# Patient Record
Sex: MALE | Race: WHITE | NOT HISPANIC OR LATINO | Employment: OTHER | URBAN - METROPOLITAN AREA
[De-identification: names, ages, dates, MRNs, and addresses within clinical notes are randomized per-mention and may not be internally consistent; named-entity substitution may affect disease eponyms.]

---

## 2017-05-02 ENCOUNTER — CONVERSION ENCOUNTER (OUTPATIENT)
Dept: ORTHOPEDIC SURGERY | Facility: CLINIC | Age: 77
End: 2017-05-02

## 2017-05-02 ENCOUNTER — HOSPITAL ENCOUNTER (OUTPATIENT)
Dept: ORTHOPEDIC SURGERY | Facility: CLINIC | Age: 77
Discharge: HOME | End: 2017-05-02
Attending: ORTHOPAEDIC SURGERY | Admitting: ORTHOPAEDIC SURGERY

## 2017-12-08 ENCOUNTER — CONVERSION ENCOUNTER (OUTPATIENT)
Dept: ORTHOPEDIC SURGERY | Facility: CLINIC | Age: 77
End: 2017-12-08

## 2019-01-01 ENCOUNTER — HOSPITAL ENCOUNTER (OUTPATIENT)
Dept: ONCOLOGY | Facility: HOSPITAL | Age: 79
Setting detail: INFUSION SERIES
Discharge: HOME | End: 2019-08-20

## 2019-01-01 ENCOUNTER — HOSPITAL ENCOUNTER (OUTPATIENT)
Dept: OTHER | Facility: HOSPITAL | Age: 79
Setting detail: SPECIMEN
Discharge: HOME | End: 2019-05-16
Attending: SURGERY | Admitting: SURGERY

## 2019-01-01 ENCOUNTER — HOSPITAL ENCOUNTER (OUTPATIENT)
Dept: INFUSION THERAPY | Facility: HOSPITAL | Age: 79
Setting detail: INFUSION SERIES
Discharge: HOME | End: 2019-09-04

## 2019-01-01 ENCOUNTER — LAB (OUTPATIENT)
Dept: LAB | Facility: HOSPITAL | Age: 79
End: 2019-01-01

## 2019-01-01 ENCOUNTER — HOSPITAL ENCOUNTER (OUTPATIENT)
Dept: ONCOLOGY | Facility: HOSPITAL | Age: 79
Setting detail: INFUSION SERIES
Discharge: HOME | End: 2019-08-21

## 2019-01-01 ENCOUNTER — HOSPITAL ENCOUNTER (OUTPATIENT)
Dept: ONCOLOGY | Facility: HOSPITAL | Age: 79
Setting detail: INFUSION SERIES
Discharge: HOME | End: 2019-08-27

## 2019-01-01 ENCOUNTER — HOSPITAL ENCOUNTER (OUTPATIENT)
Dept: LAB | Facility: HOSPITAL | Age: 79
Discharge: HOME | End: 2019-03-14
Attending: INTERNAL MEDICINE | Admitting: INTERNAL MEDICINE

## 2019-01-01 ENCOUNTER — RESULTS ENCOUNTER (OUTPATIENT)
Dept: ONCOLOGY | Facility: CLINIC | Age: 79
End: 2019-01-01

## 2019-01-01 ENCOUNTER — TELEPHONE (OUTPATIENT)
Dept: ONCOLOGY | Facility: CLINIC | Age: 79
End: 2019-01-01

## 2019-01-01 ENCOUNTER — PROCEDURE VISIT (OUTPATIENT)
Dept: ONCOLOGY | Facility: CLINIC | Age: 79
End: 2019-01-01

## 2019-01-01 ENCOUNTER — HOSPITAL ENCOUNTER (OUTPATIENT)
Dept: ONCOLOGY | Facility: HOSPITAL | Age: 79
Setting detail: INFUSION SERIES
Discharge: HOME | End: 2019-09-23

## 2019-01-01 ENCOUNTER — READMISSION MANAGEMENT (OUTPATIENT)
Dept: CALL CENTER | Facility: HOSPITAL | Age: 79
End: 2019-01-01

## 2019-01-01 ENCOUNTER — APPOINTMENT (OUTPATIENT)
Dept: ONCOLOGY | Facility: HOSPITAL | Age: 79
End: 2019-01-01

## 2019-01-01 ENCOUNTER — OFFICE VISIT (OUTPATIENT)
Dept: ONCOLOGY | Facility: CLINIC | Age: 79
End: 2019-01-01

## 2019-01-01 ENCOUNTER — HOSPITAL ENCOUNTER (OUTPATIENT)
Dept: ONCOLOGY | Facility: HOSPITAL | Age: 79
Setting detail: INFUSION SERIES
Discharge: HOME | End: 2019-09-04

## 2019-01-01 ENCOUNTER — DOCUMENTATION (OUTPATIENT)
Dept: ONCOLOGY | Facility: HOSPITAL | Age: 79
End: 2019-01-01

## 2019-01-01 ENCOUNTER — APPOINTMENT (OUTPATIENT)
Dept: LAB | Facility: HOSPITAL | Age: 79
End: 2019-01-01

## 2019-01-01 ENCOUNTER — HOSPITAL ENCOUNTER (OUTPATIENT)
Dept: ONCOLOGY | Facility: HOSPITAL | Age: 79
Setting detail: INFUSION SERIES
Discharge: HOME | End: 2019-09-03

## 2019-01-01 ENCOUNTER — HOSPITAL ENCOUNTER (OUTPATIENT)
Dept: ONCOLOGY | Facility: HOSPITAL | Age: 79
Setting detail: INFUSION SERIES
Discharge: HOME | End: 2019-08-23

## 2019-01-01 ENCOUNTER — HOSPITAL ENCOUNTER (OUTPATIENT)
Dept: ONCOLOGY | Facility: HOSPITAL | Age: 79
Setting detail: INFUSION SERIES
Discharge: HOME | End: 2019-08-30

## 2019-01-01 ENCOUNTER — APPOINTMENT (OUTPATIENT)
Dept: GENERAL RADIOLOGY | Facility: HOSPITAL | Age: 79
End: 2019-01-01

## 2019-01-01 ENCOUNTER — HOSPITAL ENCOUNTER (OUTPATIENT)
Dept: INFUSION THERAPY | Facility: HOSPITAL | Age: 79
Setting detail: INFUSION SERIES
Discharge: HOME | End: 2019-08-30

## 2019-01-01 ENCOUNTER — CONSULT (OUTPATIENT)
Dept: ONCOLOGY | Facility: CLINIC | Age: 79
End: 2019-01-01

## 2019-01-01 ENCOUNTER — APPOINTMENT (OUTPATIENT)
Dept: CT IMAGING | Facility: HOSPITAL | Age: 79
End: 2019-01-01

## 2019-01-01 ENCOUNTER — HOSPITAL ENCOUNTER (INPATIENT)
Facility: HOSPITAL | Age: 79
LOS: 4 days | Discharge: HOME HEALTH | End: 2019-09-08
Attending: INTERNAL MEDICINE | Admitting: FAMILY MEDICINE

## 2019-01-01 ENCOUNTER — HOSPITAL ENCOUNTER (OUTPATIENT)
Dept: ONCOLOGY | Facility: HOSPITAL | Age: 79
Setting detail: INFUSION SERIES
Discharge: HOME | End: 2019-08-28

## 2019-01-01 ENCOUNTER — HOSPITAL ENCOUNTER (OUTPATIENT)
Dept: PREADMISSION TESTING | Facility: HOSPITAL | Age: 79
Discharge: HOME | End: 2019-05-06
Attending: SURGERY | Admitting: SURGERY

## 2019-01-01 ENCOUNTER — APPOINTMENT (OUTPATIENT)
Dept: ONCOLOGY | Facility: CLINIC | Age: 79
End: 2019-01-01

## 2019-01-01 ENCOUNTER — HOSPITAL ENCOUNTER (OUTPATIENT)
Dept: ONCOLOGY | Facility: HOSPITAL | Age: 79
Setting detail: INFUSION SERIES
Discharge: HOME | End: 2019-08-26

## 2019-01-01 ENCOUNTER — HOSPITAL ENCOUNTER (INPATIENT)
Facility: HOSPITAL | Age: 79
LOS: 9 days | Discharge: HOSPICE | End: 2019-10-02
Attending: EMERGENCY MEDICINE | Admitting: INTERNAL MEDICINE

## 2019-01-01 ENCOUNTER — HOSPITAL ENCOUNTER (OUTPATIENT)
Dept: ONCOLOGY | Facility: HOSPITAL | Age: 79
Setting detail: INFUSION SERIES
Discharge: HOME | End: 2019-08-29

## 2019-01-01 VITALS
TEMPERATURE: 98.4 F | SYSTOLIC BLOOD PRESSURE: 102 MMHG | HEIGHT: 74 IN | WEIGHT: 183 LBS | DIASTOLIC BLOOD PRESSURE: 63 MMHG | HEART RATE: 80 BPM | BODY MASS INDEX: 23.49 KG/M2 | RESPIRATION RATE: 20 BRPM

## 2019-01-01 VITALS
HEART RATE: 105 BPM | RESPIRATION RATE: 18 BRPM | DIASTOLIC BLOOD PRESSURE: 73 MMHG | TEMPERATURE: 98.1 F | HEIGHT: 74 IN | SYSTOLIC BLOOD PRESSURE: 111 MMHG | BODY MASS INDEX: 23.61 KG/M2 | WEIGHT: 184 LBS

## 2019-01-01 VITALS
DIASTOLIC BLOOD PRESSURE: 71 MMHG | WEIGHT: 184 LBS | SYSTOLIC BLOOD PRESSURE: 108 MMHG | HEIGHT: 74 IN | SYSTOLIC BLOOD PRESSURE: 94 MMHG | HEART RATE: 71 BPM | RESPIRATION RATE: 18 BRPM | BODY MASS INDEX: 23.12 KG/M2 | WEIGHT: 180.1 LBS | TEMPERATURE: 97.6 F | DIASTOLIC BLOOD PRESSURE: 58 MMHG | BODY MASS INDEX: 23.61 KG/M2 | HEART RATE: 94 BPM | TEMPERATURE: 98 F

## 2019-01-01 VITALS
DIASTOLIC BLOOD PRESSURE: 66 MMHG | TEMPERATURE: 98.3 F | HEART RATE: 74 BPM | BODY MASS INDEX: 23.36 KG/M2 | WEIGHT: 182 LBS | SYSTOLIC BLOOD PRESSURE: 98 MMHG | HEIGHT: 74 IN

## 2019-01-01 VITALS
HEART RATE: 99 BPM | RESPIRATION RATE: 20 BRPM | TEMPERATURE: 98.6 F | HEIGHT: 74 IN | DIASTOLIC BLOOD PRESSURE: 68 MMHG | SYSTOLIC BLOOD PRESSURE: 109 MMHG | BODY MASS INDEX: 23.36 KG/M2 | WEIGHT: 182 LBS

## 2019-01-01 VITALS
OXYGEN SATURATION: 95 % | HEART RATE: 96 BPM | DIASTOLIC BLOOD PRESSURE: 67 MMHG | WEIGHT: 198.85 LBS | TEMPERATURE: 97.9 F | HEIGHT: 74 IN | RESPIRATION RATE: 20 BRPM | SYSTOLIC BLOOD PRESSURE: 104 MMHG | BODY MASS INDEX: 25.52 KG/M2

## 2019-01-01 VITALS
WEIGHT: 223 LBS | SYSTOLIC BLOOD PRESSURE: 166 MMHG | HEART RATE: 75 BPM | BODY MASS INDEX: 28.62 KG/M2 | DIASTOLIC BLOOD PRESSURE: 89 MMHG | HEIGHT: 74 IN | DIASTOLIC BLOOD PRESSURE: 94 MMHG | SYSTOLIC BLOOD PRESSURE: 172 MMHG | HEART RATE: 58 BPM | WEIGHT: 226 LBS

## 2019-01-01 VITALS
SYSTOLIC BLOOD PRESSURE: 105 MMHG | HEART RATE: 68 BPM | BODY MASS INDEX: 21.62 KG/M2 | RESPIRATION RATE: 16 BRPM | WEIGHT: 168.43 LBS | OXYGEN SATURATION: 99 % | HEIGHT: 74 IN | DIASTOLIC BLOOD PRESSURE: 68 MMHG | TEMPERATURE: 97.8 F

## 2019-01-01 VITALS
OXYGEN SATURATION: 96 % | HEART RATE: 95 BPM | RESPIRATION RATE: 15 BRPM | SYSTOLIC BLOOD PRESSURE: 95 MMHG | DIASTOLIC BLOOD PRESSURE: 61 MMHG | TEMPERATURE: 97 F

## 2019-01-01 VITALS
HEIGHT: 74 IN | RESPIRATION RATE: 18 BRPM | BODY MASS INDEX: 23.74 KG/M2 | TEMPERATURE: 97.7 F | WEIGHT: 185 LBS | DIASTOLIC BLOOD PRESSURE: 66 MMHG | SYSTOLIC BLOOD PRESSURE: 107 MMHG | HEART RATE: 79 BPM

## 2019-01-01 VITALS
WEIGHT: 167 LBS | SYSTOLIC BLOOD PRESSURE: 96 MMHG | TEMPERATURE: 98.4 F | BODY MASS INDEX: 21.43 KG/M2 | HEART RATE: 83 BPM | DIASTOLIC BLOOD PRESSURE: 65 MMHG | HEIGHT: 74 IN | RESPIRATION RATE: 18 BRPM

## 2019-01-01 VITALS
DIASTOLIC BLOOD PRESSURE: 59 MMHG | HEIGHT: 74 IN | HEART RATE: 71 BPM | RESPIRATION RATE: 20 BRPM | WEIGHT: 184.9 LBS | SYSTOLIC BLOOD PRESSURE: 92 MMHG | BODY MASS INDEX: 23.73 KG/M2 | TEMPERATURE: 98 F

## 2019-01-01 VITALS
RESPIRATION RATE: 18 BRPM | SYSTOLIC BLOOD PRESSURE: 95 MMHG | DIASTOLIC BLOOD PRESSURE: 59 MMHG | WEIGHT: 180 LBS | TEMPERATURE: 97.7 F | HEIGHT: 74 IN | HEART RATE: 111 BPM | BODY MASS INDEX: 23.1 KG/M2

## 2019-01-01 VITALS
RESPIRATION RATE: 18 BRPM | SYSTOLIC BLOOD PRESSURE: 93 MMHG | TEMPERATURE: 98 F | HEART RATE: 80 BPM | WEIGHT: 185 LBS | BODY MASS INDEX: 23.74 KG/M2 | DIASTOLIC BLOOD PRESSURE: 56 MMHG

## 2019-01-01 VITALS
SYSTOLIC BLOOD PRESSURE: 111 MMHG | HEIGHT: 74 IN | HEART RATE: 98 BPM | BODY MASS INDEX: 23.74 KG/M2 | RESPIRATION RATE: 18 BRPM | DIASTOLIC BLOOD PRESSURE: 71 MMHG | TEMPERATURE: 97.4 F | WEIGHT: 185 LBS

## 2019-01-01 VITALS
TEMPERATURE: 97.4 F | DIASTOLIC BLOOD PRESSURE: 66 MMHG | HEIGHT: 74 IN | SYSTOLIC BLOOD PRESSURE: 95 MMHG | WEIGHT: 198 LBS | HEART RATE: 101 BPM | BODY MASS INDEX: 25.41 KG/M2 | RESPIRATION RATE: 16 BRPM

## 2019-01-01 VITALS
WEIGHT: 199.4 LBS | HEIGHT: 74 IN | DIASTOLIC BLOOD PRESSURE: 69 MMHG | SYSTOLIC BLOOD PRESSURE: 111 MMHG | BODY MASS INDEX: 25.59 KG/M2 | TEMPERATURE: 98.1 F | RESPIRATION RATE: 16 BRPM | HEART RATE: 69 BPM

## 2019-01-01 VITALS
OXYGEN SATURATION: 98 % | RESPIRATION RATE: 12 BRPM | DIASTOLIC BLOOD PRESSURE: 68 MMHG | HEART RATE: 67 BPM | SYSTOLIC BLOOD PRESSURE: 102 MMHG | TEMPERATURE: 98.2 F

## 2019-01-01 DIAGNOSIS — D64.9 ANEMIA, UNSPECIFIED TYPE: Primary | ICD-10-CM

## 2019-01-01 DIAGNOSIS — C92.00 ACUTE MYELOID LEUKEMIA IN ADULT (HCC): ICD-10-CM

## 2019-01-01 DIAGNOSIS — C92.00 ACUTE MYELOID LEUKEMIA IN ADULT (HCC): Primary | ICD-10-CM

## 2019-01-01 DIAGNOSIS — Z13.79 GENETIC TESTING: ICD-10-CM

## 2019-01-01 DIAGNOSIS — R21 RASH OF PERINEUM: ICD-10-CM

## 2019-01-01 DIAGNOSIS — R79.89 ELEVATED SERUM CREATININE: ICD-10-CM

## 2019-01-01 DIAGNOSIS — C92.90 MYELOID LEUKEMIA, NOT HAVING ACHIEVED REMISSION, UNSPECIFIED MYELOID LEUKEMIA TYPE (HCC): ICD-10-CM

## 2019-01-01 DIAGNOSIS — D61.810 PANCYTOPENIA DUE TO CHEMOTHERAPY (HCC): ICD-10-CM

## 2019-01-01 DIAGNOSIS — Z85.3 PERSONAL HISTORY OF MALIGNANT NEOPLASM OF BREAST: ICD-10-CM

## 2019-01-01 DIAGNOSIS — R53.1 WEAKNESS: ICD-10-CM

## 2019-01-01 DIAGNOSIS — D70.8 OTHER NEUTROPENIA (HCC): ICD-10-CM

## 2019-01-01 DIAGNOSIS — D64.9 ANEMIA, UNSPECIFIED TYPE: ICD-10-CM

## 2019-01-01 DIAGNOSIS — R50.81 NEUTROPENIC FEVER (HCC): ICD-10-CM

## 2019-01-01 DIAGNOSIS — D47.2 MONOCLONAL GAMMOPATHY: ICD-10-CM

## 2019-01-01 DIAGNOSIS — D72.819 LEUKOPENIA, UNSPECIFIED TYPE: ICD-10-CM

## 2019-01-01 DIAGNOSIS — D70.9 NEUTROPENIC FEVER (HCC): ICD-10-CM

## 2019-01-01 DIAGNOSIS — R06.02 SHORTNESS OF BREATH: ICD-10-CM

## 2019-01-01 DIAGNOSIS — D70.9 NEUTROPENIA, UNSPECIFIED TYPE (HCC): ICD-10-CM

## 2019-01-01 DIAGNOSIS — D72.819 LEUKOPENIA, UNSPECIFIED TYPE: Primary | ICD-10-CM

## 2019-01-01 DIAGNOSIS — D59.2 DRUG-INDUCED NONAUTOIMMUNE HEMOLYTIC ANEMIA (HCC): Primary | ICD-10-CM

## 2019-01-01 DIAGNOSIS — R11.0 NAUSEA: ICD-10-CM

## 2019-01-01 DIAGNOSIS — D69.6 THROMBOCYTOPENIA (HCC): ICD-10-CM

## 2019-01-01 DIAGNOSIS — R39.15 URINARY URGENCY: ICD-10-CM

## 2019-01-01 DIAGNOSIS — R15.9 INCONTINENCE OF FECES, UNSPECIFIED FECAL INCONTINENCE TYPE: Primary | ICD-10-CM

## 2019-01-01 DIAGNOSIS — R19.7 DIARRHEA, UNSPECIFIED TYPE: Primary | ICD-10-CM

## 2019-01-01 DIAGNOSIS — E87.6 HYPOKALEMIA: ICD-10-CM

## 2019-01-01 DIAGNOSIS — Z51.11 ENCOUNTER FOR ANTINEOPLASTIC CHEMOTHERAPY: ICD-10-CM

## 2019-01-01 DIAGNOSIS — C92.00 ACUTE MYELOID LEUKEMIA NOT HAVING ACHIEVED REMISSION (HCC): Primary | ICD-10-CM

## 2019-01-01 DIAGNOSIS — R19.7 DIARRHEA, UNSPECIFIED TYPE: ICD-10-CM

## 2019-01-01 LAB
ABO + RH BLD: NORMAL
ABO GROUP BLD: NORMAL
ACANTHOCYTES BLD QL SMEAR: ABNORMAL
ADV 40+41 DNA STL QL NAA+NON-PROBE: NOT DETECTED
ADV 40+41 DNA STL QL NAA+NON-PROBE: NOT DETECTED
ALBUMIN SERPL-MCNC: 1.6 G/DL (ref 3.5–4.8)
ALBUMIN SERPL-MCNC: 1.8 G/DL (ref 3.5–4.8)
ALBUMIN SERPL-MCNC: 1.9 G/DL (ref 3.5–4.8)
ALBUMIN SERPL-MCNC: 1.9 G/DL (ref 3.5–4.8)
ALBUMIN SERPL-MCNC: 2.1 G/DL (ref 3.5–4.8)
ALBUMIN SERPL-MCNC: 2.7 G/DL (ref 3.5–4.8)
ALBUMIN SERPL-MCNC: 2.9 G/DL (ref 3.5–4.8)
ALBUMIN SERPL-MCNC: 3.4 G/DL (ref 3.5–4.8)
ALBUMIN SERPL-MCNC: 3.5 G/DL (ref 2.9–4.4)
ALBUMIN/GLOB SERPL: 0.4 G/DL (ref 1–1.7)
ALBUMIN/GLOB SERPL: 0.4 G/DL (ref 1–1.7)
ALBUMIN/GLOB SERPL: 0.5 G/DL (ref 1–1.7)
ALBUMIN/GLOB SERPL: 0.6 G/DL (ref 1–1.7)
ALBUMIN/GLOB SERPL: 0.6 G/DL (ref 1–1.7)
ALBUMIN/GLOB SERPL: 0.7 G/DL (ref 1–1.7)
ALBUMIN/GLOB SERPL: 0.8 G/DL (ref 1–1.7)
ALBUMIN/GLOB SERPL: 0.9 G/DL (ref 1–1.7)
ALBUMIN/GLOB SERPL: 1 {RATIO} (ref 0.7–1.7)
ALP SERPL-CCNC: 39 U/L (ref 32–91)
ALP SERPL-CCNC: 39 U/L (ref 32–91)
ALP SERPL-CCNC: 40 U/L (ref 32–91)
ALP SERPL-CCNC: 43 U/L (ref 32–91)
ALP SERPL-CCNC: 48 U/L (ref 32–91)
ALP SERPL-CCNC: 48 U/L (ref 32–91)
ALP SERPL-CCNC: 50 U/L (ref 32–91)
ALP SERPL-CCNC: 51 U/L (ref 32–91)
ALPHA1 GLOB FLD ELPH-MCNC: 0.3 G/DL (ref 0–0.4)
ALPHA2 GLOB SERPL ELPH-MCNC: 0.8 G/DL (ref 0.4–1)
ALT SERPL W P-5'-P-CCNC: 21 U/L (ref 17–63)
ALT SERPL W P-5'-P-CCNC: 26 U/L (ref 17–63)
ALT SERPL W P-5'-P-CCNC: 27 U/L (ref 17–63)
ALT SERPL W P-5'-P-CCNC: 27 U/L (ref 17–63)
ALT SERPL W P-5'-P-CCNC: 34 U/L (ref 17–63)
ALT SERPL W P-5'-P-CCNC: 34 U/L (ref 17–63)
ALT SERPL W P-5'-P-CCNC: 47 U/L (ref 17–63)
ALT SERPL W P-5'-P-CCNC: 49 U/L (ref 17–63)
ANION GAP SERPL CALC-SCNC: 13.6 MMOL/L (ref 10–20)
ANION GAP SERPL CALCULATED.3IONS-SCNC: 10.4 MMOL/L (ref 5–15)
ANION GAP SERPL CALCULATED.3IONS-SCNC: 11.6 MMOL/L (ref 5–15)
ANION GAP SERPL CALCULATED.3IONS-SCNC: 11.7 MMOL/L (ref 5–15)
ANION GAP SERPL CALCULATED.3IONS-SCNC: 13.6 MMOL/L (ref 5–15)
ANION GAP SERPL CALCULATED.3IONS-SCNC: 13.7 MMOL/L (ref 5–15)
ANION GAP SERPL CALCULATED.3IONS-SCNC: 14.4 MMOL/L (ref 5–15)
ANION GAP SERPL CALCULATED.3IONS-SCNC: 14.5 MMOL/L (ref 5–15)
ANION GAP SERPL CALCULATED.3IONS-SCNC: 15.2 MMOL/L (ref 5–15)
ANION GAP SERPL CALCULATED.3IONS-SCNC: 15.7 MMOL/L (ref 5–15)
ANION GAP SERPL CALCULATED.3IONS-SCNC: 17.1 MMOL/L (ref 5–15)
ANION GAP SERPL CALCULATED.3IONS-SCNC: 17.1 MMOL/L (ref 5–15)
ANION GAP SERPL CALCULATED.3IONS-SCNC: 17.2 MMOL/L (ref 5–15)
ANION GAP SERPL CALCULATED.3IONS-SCNC: 17.4 MMOL/L (ref 5–15)
ANION GAP SERPL CALCULATED.3IONS-SCNC: 17.4 MMOL/L (ref 5–15)
ANION GAP SERPL CALCULATED.3IONS-SCNC: 18.4 MMOL/L (ref 5–15)
ANION GAP SERPL CALCULATED.3IONS-SCNC: 19 MMOL/L (ref 5–15)
ANION GAP SERPL CALCULATED.3IONS-SCNC: 20 MMOL/L (ref 5–15)
ANISOCYTOSIS BLD QL: ABNORMAL
ANISOCYTOSIS BLD QL: NORMAL
APTT PPP: 25 SECONDS (ref 24–31)
AST SERPL-CCNC: 19 U/L (ref 15–41)
AST SERPL-CCNC: 29 U/L (ref 15–41)
AST SERPL-CCNC: 30 U/L (ref 15–41)
AST SERPL-CCNC: 33 U/L (ref 15–41)
AST SERPL-CCNC: 36 U/L (ref 15–41)
AST SERPL-CCNC: 39 U/L (ref 15–41)
AST SERPL-CCNC: 40 U/L (ref 15–41)
AST SERPL-CCNC: 69 U/L (ref 15–41)
ASTRO TYP 1-8 RNA STL QL NAA+NON-PROBE: NOT DETECTED
ASTRO TYP 1-8 RNA STL QL NAA+NON-PROBE: NOT DETECTED
B-GLOBULIN SERPL ELPH-MCNC: 1.6 G/DL (ref 0.7–1.3)
BACTERIA SPEC AEROBE CULT: NORMAL
BACTERIA UR QL AUTO: ABNORMAL /HPF
BASOPHILS # BLD AUTO: 0 10*3/MM3 (ref 0–0.2)
BASOPHILS # BLD AUTO: 0 10*3/UL (ref 0–0.2)
BASOPHILS # BLD AUTO: 0.01 10*3/MM3 (ref 0–0.2)
BASOPHILS # BLD MANUAL: 0.02 10*3/MM3 (ref 0–0.2)
BASOPHILS NFR BLD AUTO: 0 % (ref 0–1.5)
BASOPHILS NFR BLD AUTO: 0.5 % (ref 0–1.5)
BASOPHILS NFR BLD AUTO: 0.6 % (ref 0–1.5)
BASOPHILS NFR BLD AUTO: 0.6 % (ref 0–1.5)
BASOPHILS NFR BLD AUTO: 1 % (ref 0–1.5)
BASOPHILS NFR BLD AUTO: 1 % (ref 0–2)
BASOPHILS NFR BLD AUTO: ABNORMAL %
BASOPHILS NFR BLD AUTO: ABNORMAL %
BH BB BLOOD EXPIRATION DATE: NORMAL
BH BB BLOOD TYPE BARCODE: 5100
BH BB BLOOD TYPE BARCODE: 9500
BH BB DISPENSE STATUS: NORMAL
BH BB PRODUCT CODE: NORMAL
BH BB UNIT NUMBER: NORMAL
BILIRUB SERPL-MCNC: 0.9 MG/DL (ref 0.3–1.2)
BILIRUB SERPL-MCNC: 0.9 MG/DL (ref 0.3–1.2)
BILIRUB SERPL-MCNC: 1 MG/DL (ref 0.3–1.2)
BILIRUB SERPL-MCNC: 1.3 MG/DL (ref 0.3–1.2)
BILIRUB SERPL-MCNC: 1.4 MG/DL (ref 0.3–1.2)
BILIRUB SERPL-MCNC: 1.6 MG/DL (ref 0.3–1.2)
BILIRUB SERPL-MCNC: 1.8 MG/DL (ref 0.3–1.2)
BILIRUB SERPL-MCNC: 2 MG/DL (ref 0.3–1.2)
BILIRUB UR QL STRIP: ABNORMAL
BILIRUB UR QL STRIP: NEGATIVE MG/DL
BLASTS NFR BLD MANUAL: 13 % (ref 0–0)
BLASTS NFR BLD MANUAL: 2 % (ref 0–0)
BLASTS NFR BLD MANUAL: 6 % (ref 0–0)
BLD GP AB SCN SERPL QL: NEGATIVE
BUN BLD-MCNC: 11 MG/DL (ref 8–20)
BUN BLD-MCNC: 11 MG/DL (ref 8–20)
BUN BLD-MCNC: 13 MG/DL (ref 8–20)
BUN BLD-MCNC: 16 MG/DL (ref 8–20)
BUN BLD-MCNC: 21 MG/DL (ref 8–20)
BUN BLD-MCNC: 21 MG/DL (ref 8–20)
BUN BLD-MCNC: 23 MG/DL (ref 8–20)
BUN BLD-MCNC: 23 MG/DL (ref 8–20)
BUN BLD-MCNC: 24 MG/DL (ref 8–20)
BUN BLD-MCNC: 26 MG/DL (ref 8–20)
BUN BLD-MCNC: 27 MG/DL (ref 8–20)
BUN BLD-MCNC: 28 MG/DL (ref 8–20)
BUN BLD-MCNC: 28 MG/DL (ref 8–20)
BUN SERPL-MCNC: 18 MG/DL (ref 8–20)
BUN/CREAT SERPL: 10.6 (ref 6.2–20.3)
BUN/CREAT SERPL: 11 (ref 6.2–20.3)
BUN/CREAT SERPL: 11.4 (ref 6.2–20.3)
BUN/CREAT SERPL: 11.4 (ref 6.2–20.3)
BUN/CREAT SERPL: 11.7 (ref 6.2–20.3)
BUN/CREAT SERPL: 12 (ref 6.2–20.3)
BUN/CREAT SERPL: 12.2 (ref 6.2–20.3)
BUN/CREAT SERPL: 12.4 (ref 6.2–20.3)
BUN/CREAT SERPL: 13.5 (ref 6.2–20.3)
BUN/CREAT SERPL: 13.7 (ref 6.2–20.3)
BUN/CREAT SERPL: 14.2 (ref 6.2–20.3)
BUN/CREAT SERPL: 15 (ref 6.2–20.3)
BUN/CREAT SERPL: 17.5 (ref 6.2–20.3)
BUN/CREAT SERPL: 7.3 (ref 6.2–20.3)
BUN/CREAT SERPL: 7.6 (ref 6.2–20.3)
BUN/CREAT SERPL: 8.4 (ref 6.2–20.3)
BUN/CREAT SERPL: 8.5 (ref 6.2–20.3)
BUN/CREAT SERPL: 8.9 (ref 6.2–20.3)
C CAYETANENSIS DNA STL QL NAA+NON-PROBE: NOT DETECTED
C CAYETANENSIS DNA STL QL NAA+NON-PROBE: NOT DETECTED
C DIFF GDH STL QL: NEGATIVE
C DIFF GDH STL QL: NEGATIVE
CALCIUM SERPL-MCNC: 9.3 MG/DL (ref 8.9–10.3)
CALCIUM SPEC-SCNC: 7 MG/DL (ref 8.9–10.3)
CALCIUM SPEC-SCNC: 7.1 MG/DL (ref 8.9–10.3)
CALCIUM SPEC-SCNC: 7.2 MG/DL (ref 8.9–10.3)
CALCIUM SPEC-SCNC: 7.3 MG/DL (ref 8.9–10.3)
CALCIUM SPEC-SCNC: 7.4 MG/DL (ref 8.9–10.3)
CALCIUM SPEC-SCNC: 7.5 MG/DL (ref 8.9–10.3)
CALCIUM SPEC-SCNC: 7.5 MG/DL (ref 8.9–10.3)
CALCIUM SPEC-SCNC: 7.7 MG/DL (ref 8.9–10.3)
CALCIUM SPEC-SCNC: 7.8 MG/DL (ref 8.9–10.3)
CALCIUM SPEC-SCNC: 7.9 MG/DL (ref 8.9–10.3)
CALCIUM SPEC-SCNC: 8 MG/DL (ref 8.9–10.3)
CALCIUM SPEC-SCNC: 8.4 MG/DL (ref 8.9–10.3)
CAMPY SP DNA.DIARRHEA STL QL NAA+PROBE: NOT DETECTED
CAMPY SP DNA.DIARRHEA STL QL NAA+PROBE: NOT DETECTED
CASTS URNS QL MICRO: ABNORMAL /[LPF]
CHLORIDE SERPL-SCNC: 100 MMOL/L (ref 101–111)
CHLORIDE SERPL-SCNC: 101 MMOL/L (ref 101–111)
CHLORIDE SERPL-SCNC: 102 MMOL/L (ref 101–111)
CHLORIDE SERPL-SCNC: 103 MMOL/L (ref 101–111)
CHLORIDE SERPL-SCNC: 103 MMOL/L (ref 101–111)
CHLORIDE SERPL-SCNC: 105 MMOL/L (ref 101–111)
CHLORIDE SERPL-SCNC: 105 MMOL/L (ref 101–111)
CHLORIDE SERPL-SCNC: 95 MMOL/L (ref 101–111)
CHLORIDE SERPL-SCNC: 96 MMOL/L (ref 101–111)
CHLORIDE SERPL-SCNC: 96 MMOL/L (ref 101–111)
CHLORIDE SERPL-SCNC: 97 MMOL/L (ref 101–111)
CHLORIDE SERPL-SCNC: 98 MMOL/L (ref 101–111)
CHLORIDE SERPL-SCNC: 99 MMOL/L (ref 101–111)
CHLORIDE SERPL-SCNC: 99 MMOL/L (ref 101–111)
CLARITY UR: CLEAR
CO2 SERPL-SCNC: 19 MMOL/L (ref 22–32)
CO2 SERPL-SCNC: 19 MMOL/L (ref 22–32)
CO2 SERPL-SCNC: 20 MMOL/L (ref 22–32)
CO2 SERPL-SCNC: 21 MMOL/L (ref 22–32)
CO2 SERPL-SCNC: 21 MMOL/L (ref 22–32)
CO2 SERPL-SCNC: 23 MMOL/L (ref 22–32)
CO2 SERPL-SCNC: 23 MMOL/L (ref 22–32)
CO2 SERPL-SCNC: 24 MMOL/L (ref 22–32)
CO2 SERPL-SCNC: 26 MMOL/L (ref 22–32)
CO2 SERPL-SCNC: 27 MMOL/L (ref 22–32)
COLOR UR: YELLOW
COLOR UR: YELLOW
CONV BACTERIA IN URINE MICRO: NEGATIVE
CONV CLARITY OF URINE: CLEAR
CONV CO2: 26 MMOL/L (ref 22–32)
CONV HYALINE CASTS IN URINE MICRO: 2 /[LPF] (ref 0–5)
CONV PROTEIN IN URINE BY AUTOMATED TEST STRIP: 30 MG/DL
CONV SMALL ROUND CELLS: ABNORMAL /[HPF]
CONV UROBILINOGEN IN URINE BY AUTOMATED TEST STRIP: 0.2 MG/DL
CREAT BLD-MCNC: 1.3 MG/DL (ref 0.7–1.2)
CREAT BLD-MCNC: 1.4 MG/DL (ref 0.7–1.2)
CREAT BLD-MCNC: 1.5 MG/DL (ref 0.7–1.2)
CREAT BLD-MCNC: 1.6 MG/DL (ref 0.7–1.2)
CREAT BLD-MCNC: 1.6 MG/DL (ref 0.7–1.2)
CREAT BLD-MCNC: 1.7 MG/DL (ref 0.7–1.2)
CREAT BLD-MCNC: 1.8 MG/DL (ref 0.7–1.2)
CREAT BLD-MCNC: 1.8 MG/DL (ref 0.7–1.2)
CREAT BLD-MCNC: 1.9 MG/DL (ref 0.7–1.2)
CREAT BLD-MCNC: 2 MG/DL (ref 0.7–1.2)
CREAT BLD-MCNC: 2.1 MG/DL (ref 0.7–1.2)
CREAT BLD-MCNC: 2.1 MG/DL (ref 0.7–1.2)
CREAT BLD-MCNC: 2.3 MG/DL (ref 0.7–1.2)
CREAT UR-MCNC: 1.7 MG/DL (ref 0.7–1.2)
CRYPTOSP STL CULT: NOT DETECTED
CRYPTOSP STL CULT: NOT DETECTED
CULTURE INDICATED?: ABNORMAL
DEPRECATED RDW RBC AUTO: 47.3 FL (ref 37–54)
DEPRECATED RDW RBC AUTO: 49 FL (ref 37–54)
DEPRECATED RDW RBC AUTO: 49 FL (ref 37–54)
DEPRECATED RDW RBC AUTO: 50.3 FL (ref 37–54)
DEPRECATED RDW RBC AUTO: 51.2 FL (ref 37–54)
DEPRECATED RDW RBC AUTO: 51.6 FL (ref 37–54)
DEPRECATED RDW RBC AUTO: 52.4 FL (ref 37–54)
DEPRECATED RDW RBC AUTO: 52.5 FL (ref 37–54)
DEPRECATED RDW RBC AUTO: 52.6 FL (ref 37–54)
DEPRECATED RDW RBC AUTO: 52.8 FL (ref 37–54)
DEPRECATED RDW RBC AUTO: 53.4 FL (ref 37–54)
DEPRECATED RDW RBC AUTO: 53.4 FL (ref 37–54)
DEPRECATED RDW RBC AUTO: 53.8 FL (ref 37–54)
DEPRECATED RDW RBC AUTO: 53.8 FL (ref 37–54)
DEPRECATED RDW RBC AUTO: 54.7 FL (ref 37–54)
DEPRECATED RDW RBC AUTO: 54.9 FL (ref 37–54)
DEPRECATED RDW RBC AUTO: 55 FL (ref 37–54)
DEPRECATED RDW RBC AUTO: 55.1 FL (ref 37–54)
DEPRECATED RDW RBC AUTO: 55.7 FL (ref 37–54)
DEPRECATED RDW RBC AUTO: 55.7 FL (ref 37–54)
DEPRECATED RDW RBC AUTO: 56.4 FL (ref 37–54)
DEPRECATED RDW RBC AUTO: 56.4 FL (ref 37–54)
DEPRECATED RDW RBC AUTO: 57.8 FL (ref 37–54)
DEPRECATED RDW RBC AUTO: 58.6 FL (ref 37–54)
DIFFERENTIAL METHOD BLD: (no result)
E COLI DNA SPEC QL NAA+PROBE: NOT DETECTED
E COLI DNA SPEC QL NAA+PROBE: NOT DETECTED
E HISTOLYT AG STL-ACNC: NOT DETECTED
E HISTOLYT AG STL-ACNC: NOT DETECTED
EAEC PAA PLAS AGGR+AATA ST NAA+NON-PRB: NOT DETECTED
EAEC PAA PLAS AGGR+AATA ST NAA+NON-PRB: NOT DETECTED
EC STX1 + STX2 GENES STL NAA+PROBE: NOT DETECTED
EC STX1 + STX2 GENES STL NAA+PROBE: NOT DETECTED
ELLIPTOCYTES BLD QL SMEAR: ABNORMAL
EOSINOPHIL # BLD AUTO: 0 10*3/MM3 (ref 0–0.4)
EOSINOPHIL # BLD AUTO: 0.01 10*3/MM3 (ref 0–0.4)
EOSINOPHIL # BLD AUTO: 0.03 10*3/MM3 (ref 0–0.4)
EOSINOPHIL # BLD AUTO: 0.1 10*3/UL (ref 0–0.3)
EOSINOPHIL # BLD AUTO: 3 % (ref 0–3)
EOSINOPHIL # BLD MANUAL: 0.02 10*3/MM3 (ref 0–0.4)
EOSINOPHIL NFR BLD AUTO: 0 % (ref 0.3–6.2)
EOSINOPHIL NFR BLD AUTO: 0 % (ref 0.3–6.2)
EOSINOPHIL NFR BLD AUTO: 0.5 % (ref 0.3–6.2)
EOSINOPHIL NFR BLD AUTO: 0.5 % (ref 0.3–6.2)
EOSINOPHIL NFR BLD AUTO: 0.6 % (ref 0.3–6.2)
EOSINOPHIL NFR BLD AUTO: 0.6 % (ref 0.3–6.2)
EOSINOPHIL NFR BLD AUTO: 0.9 % (ref 0.3–6.2)
EOSINOPHIL NFR BLD AUTO: 1.9 % (ref 0.3–6.2)
EOSINOPHIL NFR BLD AUTO: ABNORMAL %
EOSINOPHIL NFR BLD AUTO: ABNORMAL %
EOSINOPHIL NFR BLD MANUAL: 1 % (ref 0.3–6.2)
EPEC EAE GENE STL QL NAA+NON-PROBE: NOT DETECTED
EPEC EAE GENE STL QL NAA+NON-PROBE: NOT DETECTED
ERYTHROCYTE [DISTWIDTH] IN BLOOD BY AUTOMATED COUNT: 13.8 % (ref 11.5–14.5)
ERYTHROCYTE [DISTWIDTH] IN BLOOD BY AUTOMATED COUNT: 15.1 % (ref 12.3–15.4)
ERYTHROCYTE [DISTWIDTH] IN BLOOD BY AUTOMATED COUNT: 15.4 % (ref 12.3–15.4)
ERYTHROCYTE [DISTWIDTH] IN BLOOD BY AUTOMATED COUNT: 15.5 % (ref 12.3–15.4)
ERYTHROCYTE [DISTWIDTH] IN BLOOD BY AUTOMATED COUNT: 15.6 % (ref 12.3–15.4)
ERYTHROCYTE [DISTWIDTH] IN BLOOD BY AUTOMATED COUNT: 15.8 % (ref 12.3–15.4)
ERYTHROCYTE [DISTWIDTH] IN BLOOD BY AUTOMATED COUNT: 15.9 % (ref 12.3–15.4)
ERYTHROCYTE [DISTWIDTH] IN BLOOD BY AUTOMATED COUNT: 15.9 % (ref 12.3–15.4)
ERYTHROCYTE [DISTWIDTH] IN BLOOD BY AUTOMATED COUNT: 16 % (ref 12.3–15.4)
ERYTHROCYTE [DISTWIDTH] IN BLOOD BY AUTOMATED COUNT: 16.1 % (ref 12.3–15.4)
ERYTHROCYTE [DISTWIDTH] IN BLOOD BY AUTOMATED COUNT: 16.2 % (ref 12.3–15.4)
ERYTHROCYTE [DISTWIDTH] IN BLOOD BY AUTOMATED COUNT: 16.4 % (ref 12.3–15.4)
ERYTHROCYTE [DISTWIDTH] IN BLOOD BY AUTOMATED COUNT: 16.4 % (ref 12.3–15.4)
ERYTHROCYTE [DISTWIDTH] IN BLOOD BY AUTOMATED COUNT: 16.5 % (ref 12.3–15.4)
ERYTHROCYTE [DISTWIDTH] IN BLOOD BY AUTOMATED COUNT: 16.6 % (ref 12.3–15.4)
ERYTHROCYTE [DISTWIDTH] IN BLOOD BY AUTOMATED COUNT: 16.7 % (ref 12.3–15.4)
ERYTHROCYTE [DISTWIDTH] IN BLOOD BY AUTOMATED COUNT: 16.8 % (ref 12.3–15.4)
ERYTHROCYTE [DISTWIDTH] IN BLOOD BY AUTOMATED COUNT: 16.9 % (ref 12.3–15.4)
ERYTHROCYTE [DISTWIDTH] IN BLOOD BY AUTOMATED COUNT: 17 % (ref 12.3–15.4)
ERYTHROCYTE [DISTWIDTH] IN BLOOD BY AUTOMATED COUNT: 17.3 % (ref 12.3–15.4)
ERYTHROCYTE [DISTWIDTH] IN BLOOD BY AUTOMATED COUNT: 17.6 % (ref 12.3–15.4)
ERYTHROCYTE [SEDIMENTATION RATE] IN BLOOD: 107 MM/HR (ref 0–20)
ETEC LTA+ST1A+ST1B TOX ST NAA+NON-PROBE: NOT DETECTED
ETEC LTA+ST1A+ST1B TOX ST NAA+NON-PROBE: NOT DETECTED
G LAMBLIA DNA SPEC QL NAA+PROBE: NOT DETECTED
G LAMBLIA DNA SPEC QL NAA+PROBE: NOT DETECTED
GAMMA GLOB SERPL ELPH-MCNC: 0.7 G/DL (ref 0.4–1.8)
GFR SERPL CREATININE-BSD FRML MDRD: 27 ML/MIN/1.73
GFR SERPL CREATININE-BSD FRML MDRD: 31 ML/MIN/1.73
GFR SERPL CREATININE-BSD FRML MDRD: 31 ML/MIN/1.73
GFR SERPL CREATININE-BSD FRML MDRD: 32 ML/MIN/1.73
GFR SERPL CREATININE-BSD FRML MDRD: 34 ML/MIN/1.73
GFR SERPL CREATININE-BSD FRML MDRD: 36 ML/MIN/1.73
GFR SERPL CREATININE-BSD FRML MDRD: 36 ML/MIN/1.73
GFR SERPL CREATININE-BSD FRML MDRD: 39 ML/MIN/1.73
GFR SERPL CREATININE-BSD FRML MDRD: 42 ML/MIN/1.73
GFR SERPL CREATININE-BSD FRML MDRD: 42 ML/MIN/1.73
GFR SERPL CREATININE-BSD FRML MDRD: 45 ML/MIN/1.73
GFR SERPL CREATININE-BSD FRML MDRD: 49 ML/MIN/1.73
GFR SERPL CREATININE-BSD FRML MDRD: 53 ML/MIN/1.73
GIANT PLATELETS: ABNORMAL
GLOBULIN SER CALC-MCNC: 3.5 G/DL (ref 2.2–3.9)
GLOBULIN UR ELPH-MCNC: 3 GM/DL (ref 2.5–3.8)
GLOBULIN UR ELPH-MCNC: 3.3 GM/DL (ref 2.5–3.8)
GLOBULIN UR ELPH-MCNC: 3.7 GM/DL (ref 2.5–3.8)
GLOBULIN UR ELPH-MCNC: 3.9 GM/DL (ref 2.5–3.8)
GLOBULIN UR ELPH-MCNC: 4.1 GM/DL (ref 2.5–3.8)
GLOBULIN UR ELPH-MCNC: 4.2 GM/DL (ref 2.5–3.8)
GLUCOSE BLD-MCNC: 102 MG/DL (ref 65–99)
GLUCOSE BLD-MCNC: 106 MG/DL (ref 65–99)
GLUCOSE BLD-MCNC: 107 MG/DL (ref 65–99)
GLUCOSE BLD-MCNC: 107 MG/DL (ref 65–99)
GLUCOSE BLD-MCNC: 112 MG/DL (ref 65–99)
GLUCOSE BLD-MCNC: 112 MG/DL (ref 65–99)
GLUCOSE BLD-MCNC: 113 MG/DL (ref 65–99)
GLUCOSE BLD-MCNC: 117 MG/DL (ref 65–99)
GLUCOSE BLD-MCNC: 119 MG/DL (ref 65–99)
GLUCOSE BLD-MCNC: 122 MG/DL (ref 65–99)
GLUCOSE BLD-MCNC: 123 MG/DL (ref 65–99)
GLUCOSE BLD-MCNC: 126 MG/DL (ref 65–99)
GLUCOSE BLD-MCNC: 130 MG/DL (ref 65–99)
GLUCOSE BLD-MCNC: 175 MG/DL (ref 65–99)
GLUCOSE BLD-MCNC: 82 MG/DL (ref 65–99)
GLUCOSE BLD-MCNC: 89 MG/DL (ref 65–99)
GLUCOSE BLD-MCNC: 98 MG/DL (ref 65–99)
GLUCOSE BLDC GLUCOMTR-MCNC: 107 MG/DL (ref 70–105)
GLUCOSE SERPL-MCNC: 96 MG/DL (ref 65–99)
GLUCOSE UR QL: NEGATIVE MG/DL
GLUCOSE UR STRIP-MCNC: NEGATIVE MG/DL
GRAN CASTS URNS QL MICRO: ABNORMAL /LPF
HAPTOGLOB SERPL-MCNC: 175 MG/DL (ref 36–195)
HCT VFR BLD AUTO: 21.1 % (ref 37.5–51)
HCT VFR BLD AUTO: 21.5 % (ref 37.5–51)
HCT VFR BLD AUTO: 21.8 % (ref 37.5–51)
HCT VFR BLD AUTO: 21.9 % (ref 37.5–51)
HCT VFR BLD AUTO: 21.9 % (ref 37.5–51)
HCT VFR BLD AUTO: 22.1 % (ref 37.5–51)
HCT VFR BLD AUTO: 22.2 % (ref 37.5–51)
HCT VFR BLD AUTO: 22.4 % (ref 37.5–51)
HCT VFR BLD AUTO: 22.5 % (ref 37.5–51)
HCT VFR BLD AUTO: 22.7 % (ref 37.5–51)
HCT VFR BLD AUTO: 22.7 % (ref 37.5–51)
HCT VFR BLD AUTO: 22.8 % (ref 37.5–51)
HCT VFR BLD AUTO: 22.9 % (ref 37.5–51)
HCT VFR BLD AUTO: 22.9 % (ref 37.5–51)
HCT VFR BLD AUTO: 23 % (ref 37.5–51)
HCT VFR BLD AUTO: 23.1 % (ref 37.5–51)
HCT VFR BLD AUTO: 23.1 % (ref 37.5–51)
HCT VFR BLD AUTO: 23.6 % (ref 37.5–51)
HCT VFR BLD AUTO: 23.8 % (ref 37.5–51)
HCT VFR BLD AUTO: 24.1 % (ref 37.5–51)
HCT VFR BLD AUTO: 24.3 % (ref 37.5–51)
HCT VFR BLD AUTO: 24.5 % (ref 37.5–51)
HCT VFR BLD AUTO: 26.2 % (ref 37.5–51)
HCT VFR BLD AUTO: 26.9 % (ref 37.5–51)
HCT VFR BLD AUTO: 27.1 % (ref 37.5–51)
HCT VFR BLD AUTO: 27.3 % (ref 37.5–51)
HCT VFR BLD AUTO: 27.6 % (ref 37.5–51)
HCT VFR BLD AUTO: 27.7 % (ref 37.5–51)
HCT VFR BLD AUTO: 28.9 % (ref 37.5–51)
HCT VFR BLD AUTO: 29.2 % (ref 37.5–51)
HCT VFR BLD AUTO: 38.2 % (ref 40–54)
HGB BLD-MCNC: 12.9 G/DL (ref 14–18)
HGB BLD-MCNC: 6.9 G/DL (ref 13–17.7)
HGB BLD-MCNC: 7.2 G/DL (ref 13–17.7)
HGB BLD-MCNC: 7.2 G/DL (ref 13–17.7)
HGB BLD-MCNC: 7.3 G/DL (ref 13–17.7)
HGB BLD-MCNC: 7.3 G/DL (ref 13–17.7)
HGB BLD-MCNC: 7.4 G/DL (ref 13–17.7)
HGB BLD-MCNC: 7.5 G/DL (ref 13–17.7)
HGB BLD-MCNC: 7.6 G/DL (ref 13–17.7)
HGB BLD-MCNC: 7.7 G/DL (ref 13–17.7)
HGB BLD-MCNC: 7.8 G/DL (ref 13–17.7)
HGB BLD-MCNC: 7.9 G/DL (ref 13–17.7)
HGB BLD-MCNC: 8.1 G/DL (ref 13–17.7)
HGB BLD-MCNC: 8.5 G/DL (ref 13–17.7)
HGB BLD-MCNC: 8.6 G/DL (ref 13–17.7)
HGB BLD-MCNC: 8.8 G/DL (ref 13–17.7)
HGB BLD-MCNC: 8.8 G/DL (ref 13–17.7)
HGB BLD-MCNC: 9 G/DL (ref 13–17.7)
HGB BLD-MCNC: 9.1 G/DL (ref 13–17.7)
HGB BLD-MCNC: 9.4 G/DL (ref 13–17.7)
HGB BLD-MCNC: 9.5 G/DL (ref 13–17.7)
HGB BLD-MCNC: 9.5 G/DL (ref 13–17.7)
HGB UR QL STRIP.AUTO: NEGATIVE
HGB UR QL STRIP: NEGATIVE
HYALINE CASTS UR QL AUTO: ABNORMAL /LPF
IFES: NORMAL
INR PPP: 1.43 (ref 0.9–1.1)
KAPPA LC FREE SER-MCNC: 39.75 MG/DL (ref 0.33–1.94)
KAPPA LC FREE/LAMBDA FREE SER: 22.21 {RATIO} (ref 0.26–1.65)
KETONES UR QL STRIP: NEGATIVE
KETONES UR QL STRIP: NEGATIVE MG/DL
LAB AP CASE REPORT: NORMAL
LAB AP CASE REPORT: NORMAL
LAMBDA LC FREE SERPL-MCNC: 1.79 MG/DL (ref 0.57–2.63)
LEUKOCYTE ESTERASE UR QL STRIP.AUTO: NEGATIVE
LEUKOCYTE ESTERASE UR QL STRIP: NEGATIVE
LYMPHOCYTES # BLD AUTO: 0.5 10*3/MM3 (ref 0.7–3.1)
LYMPHOCYTES # BLD AUTO: 0.68 10*3/MM3 (ref 0.7–3.1)
LYMPHOCYTES # BLD AUTO: 0.89 10*3/MM3 (ref 0.7–3.1)
LYMPHOCYTES # BLD AUTO: 0.96 10*3/MM3 (ref 0.7–3.1)
LYMPHOCYTES # BLD AUTO: 0.98 10*3/MM3 (ref 0.7–3.1)
LYMPHOCYTES # BLD AUTO: 1.12 10*3/MM3 (ref 0.7–3.1)
LYMPHOCYTES # BLD AUTO: 1.12 10*3/MM3 (ref 0.7–3.1)
LYMPHOCYTES # BLD AUTO: 1.2 10*3/MM3 (ref 0.7–3.1)
LYMPHOCYTES # BLD AUTO: 1.4 10*3/UL (ref 0.8–4.8)
LYMPHOCYTES # BLD MANUAL: 0.53 10*3/MM3 (ref 0.7–3.1)
LYMPHOCYTES # BLD MANUAL: 0.54 10*3/MM3 (ref 0.7–3.1)
LYMPHOCYTES # BLD MANUAL: 0.63 10*3/MM3 (ref 0.7–3.1)
LYMPHOCYTES # BLD MANUAL: 0.79 10*3/MM3 (ref 0.7–3.1)
LYMPHOCYTES # BLD MANUAL: 0.8 10*3/MM3 (ref 0.7–3.1)
LYMPHOCYTES # BLD MANUAL: 0.83 10*3/MM3 (ref 0.7–3.1)
LYMPHOCYTES # BLD MANUAL: 0.99 10*3/MM3 (ref 0.7–3.1)
LYMPHOCYTES NFR BLD AUTO: 33 % (ref 18–42)
LYMPHOCYTES NFR BLD AUTO: 37.8 % (ref 19.6–45.3)
LYMPHOCYTES NFR BLD AUTO: 38.4 % (ref 19.6–45.3)
LYMPHOCYTES NFR BLD AUTO: 48.6 % (ref 19.6–45.3)
LYMPHOCYTES NFR BLD AUTO: 55.2 % (ref 19.6–45.3)
LYMPHOCYTES NFR BLD AUTO: 58.3 % (ref 19.6–45.3)
LYMPHOCYTES NFR BLD AUTO: 59.6 % (ref 19.6–45.3)
LYMPHOCYTES NFR BLD AUTO: 60.9 % (ref 19.6–45.3)
LYMPHOCYTES NFR BLD AUTO: 74.6 % (ref 19.6–45.3)
LYMPHOCYTES NFR BLD AUTO: ABNORMAL %
LYMPHOCYTES NFR BLD AUTO: ABNORMAL %
LYMPHOCYTES NFR BLD MANUAL: 12 % (ref 5–12)
LYMPHOCYTES NFR BLD MANUAL: 22 % (ref 5–12)
LYMPHOCYTES NFR BLD MANUAL: 32 % (ref 19.6–45.3)
LYMPHOCYTES NFR BLD MANUAL: 38 % (ref 5–12)
LYMPHOCYTES NFR BLD MANUAL: 4 % (ref 5–12)
LYMPHOCYTES NFR BLD MANUAL: 41 % (ref 5–12)
LYMPHOCYTES NFR BLD MANUAL: 45 % (ref 5–12)
LYMPHOCYTES NFR BLD MANUAL: 45 % (ref 5–12)
LYMPHOCYTES NFR BLD MANUAL: 47 % (ref 19.6–45.3)
LYMPHOCYTES NFR BLD MANUAL: 52 % (ref 19.6–45.3)
LYMPHOCYTES NFR BLD MANUAL: 55 % (ref 19.6–45.3)
LYMPHOCYTES NFR BLD MANUAL: 59 % (ref 19.6–45.3)
LYMPHOCYTES NFR BLD MANUAL: 79 % (ref 19.6–45.3)
LYMPHOCYTES NFR BLD MANUAL: 88 % (ref 19.6–45.3)
Lab: ABNORMAL
M-SPIKE: 0.8 G/DL
MAGNESIUM SERPL-MCNC: 1.9 MG/DL (ref 1.8–2.5)
MAGNESIUM SERPL-MCNC: 2 MG/DL (ref 1.8–2.5)
MAGNESIUM SERPL-MCNC: 2 MG/DL (ref 1.8–2.5)
MAGNESIUM SERPL-MCNC: 2.1 MG/DL (ref 1.8–2.5)
MCH RBC QN AUTO: 30.1 PG (ref 26.6–33)
MCH RBC QN AUTO: 30.2 PG (ref 26.6–33)
MCH RBC QN AUTO: 30.6 PG (ref 26.6–33)
MCH RBC QN AUTO: 30.6 PG (ref 26.6–33)
MCH RBC QN AUTO: 30.6 PG (ref 26–32)
MCH RBC QN AUTO: 30.7 PG (ref 26.6–33)
MCH RBC QN AUTO: 30.8 PG (ref 26.6–33)
MCH RBC QN AUTO: 30.9 PG (ref 26.6–33)
MCH RBC QN AUTO: 31.1 PG (ref 26.6–33)
MCH RBC QN AUTO: 31.4 PG (ref 26.6–33)
MCH RBC QN AUTO: 31.5 PG (ref 26.6–33)
MCH RBC QN AUTO: 31.6 PG (ref 26.6–33)
MCH RBC QN AUTO: 31.6 PG (ref 26.6–33)
MCH RBC QN AUTO: 31.8 PG (ref 26.6–33)
MCH RBC QN AUTO: 32 PG (ref 26.6–33)
MCH RBC QN AUTO: 32 PG (ref 26.6–33)
MCH RBC QN AUTO: 32.2 PG (ref 26.6–33)
MCH RBC QN AUTO: 32.3 PG (ref 26.6–33)
MCH RBC QN AUTO: 32.3 PG (ref 26.6–33)
MCH RBC QN AUTO: 32.6 PG (ref 26.6–33)
MCH RBC QN AUTO: 32.6 PG (ref 26.6–33)
MCH RBC QN AUTO: 32.8 PG (ref 26.6–33)
MCH RBC QN AUTO: 32.9 PG (ref 26.6–33)
MCH RBC QN AUTO: 32.9 PG (ref 26.6–33)
MCH RBC QN AUTO: 33 PG (ref 26.6–33)
MCH RBC QN AUTO: 33.2 PG (ref 26.6–33)
MCH RBC QN AUTO: 33.2 PG (ref 26.6–33)
MCHC RBC AUTO-ENTMCNC: 30.8 G/DL (ref 31.5–35.7)
MCHC RBC AUTO-ENTMCNC: 32.2 G/DL (ref 31.5–35.7)
MCHC RBC AUTO-ENTMCNC: 32.5 G/DL (ref 31.5–35.7)
MCHC RBC AUTO-ENTMCNC: 32.9 G/DL (ref 31.5–35.7)
MCHC RBC AUTO-ENTMCNC: 33.1 G/DL (ref 31.5–35.7)
MCHC RBC AUTO-ENTMCNC: 33.2 G/DL (ref 31.5–35.7)
MCHC RBC AUTO-ENTMCNC: 33.3 G/DL (ref 31.5–35.7)
MCHC RBC AUTO-ENTMCNC: 33.4 G/DL (ref 31.5–35.7)
MCHC RBC AUTO-ENTMCNC: 33.5 G/DL (ref 31.5–35.7)
MCHC RBC AUTO-ENTMCNC: 33.6 G/DL (ref 31.5–35.7)
MCHC RBC AUTO-ENTMCNC: 33.7 G/DL (ref 31.5–35.7)
MCHC RBC AUTO-ENTMCNC: 33.7 G/DL (ref 31.5–35.7)
MCHC RBC AUTO-ENTMCNC: 33.7 G/DL (ref 32–36)
MCHC RBC AUTO-ENTMCNC: 33.9 G/DL (ref 31.5–35.7)
MCHC RBC AUTO-ENTMCNC: 34 G/DL (ref 31.5–35.7)
MCHC RBC AUTO-ENTMCNC: 34 G/DL (ref 31.5–35.7)
MCHC RBC AUTO-ENTMCNC: 34.1 G/DL (ref 31.5–35.7)
MCHC RBC AUTO-ENTMCNC: 34.1 G/DL (ref 31.5–35.7)
MCHC RBC AUTO-ENTMCNC: 34.3 G/DL (ref 31.5–35.7)
MCHC RBC AUTO-ENTMCNC: 34.3 G/DL (ref 31.5–35.7)
MCHC RBC AUTO-ENTMCNC: 34.9 G/DL (ref 31.5–35.7)
MCHC RBC AUTO-ENTMCNC: 35 G/DL (ref 31.5–35.7)
MCV RBC AUTO: 100.4 FL (ref 79–97)
MCV RBC AUTO: 90.2 FL (ref 79–97)
MCV RBC AUTO: 90.7 FL (ref 79–97)
MCV RBC AUTO: 90.8 FL (ref 80–94)
MCV RBC AUTO: 91 FL (ref 79–97)
MCV RBC AUTO: 91.7 FL (ref 79–97)
MCV RBC AUTO: 92.7 FL (ref 79–97)
MCV RBC AUTO: 93 FL (ref 79–97)
MCV RBC AUTO: 93.1 FL (ref 79–97)
MCV RBC AUTO: 94 FL (ref 79–97)
MCV RBC AUTO: 94.4 FL (ref 79–97)
MCV RBC AUTO: 94.6 FL (ref 79–97)
MCV RBC AUTO: 94.7 FL (ref 79–97)
MCV RBC AUTO: 94.7 FL (ref 79–97)
MCV RBC AUTO: 94.9 FL (ref 79–97)
MCV RBC AUTO: 95.2 FL (ref 79–97)
MCV RBC AUTO: 95.5 FL (ref 79–97)
MCV RBC AUTO: 95.7 FL (ref 79–97)
MCV RBC AUTO: 95.8 FL (ref 79–97)
MCV RBC AUTO: 96.4 FL (ref 79–97)
MCV RBC AUTO: 96.8 FL (ref 79–97)
MCV RBC AUTO: 96.9 FL (ref 79–97)
MCV RBC AUTO: 97.8 FL (ref 79–97)
MCV RBC AUTO: 97.8 FL (ref 79–97)
MCV RBC AUTO: 97.9 FL (ref 79–97)
MCV RBC AUTO: 98.3 FL (ref 79–97)
MCV RBC AUTO: 99.6 FL (ref 79–97)
MONOCYTES # BLD AUTO: 0.04 10*3/MM3 (ref 0.1–0.9)
MONOCYTES # BLD AUTO: 0.1 10*3/MM3 (ref 0.1–0.9)
MONOCYTES # BLD AUTO: 0.15 10*3/MM3 (ref 0.1–0.9)
MONOCYTES # BLD AUTO: 0.3 10*3/UL (ref 0.1–1.3)
MONOCYTES # BLD AUTO: 0.34 10*3/MM3 (ref 0.1–0.9)
MONOCYTES # BLD AUTO: 0.4 10*3/MM3 (ref 0.1–0.9)
MONOCYTES # BLD AUTO: 0.44 10*3/MM3 (ref 0.1–0.9)
MONOCYTES # BLD AUTO: 0.66 10*3/MM3 (ref 0.1–0.9)
MONOCYTES # BLD AUTO: 0.66 10*3/MM3 (ref 0.1–0.9)
MONOCYTES # BLD AUTO: 0.72 10*3/MM3 (ref 0.1–0.9)
MONOCYTES # BLD AUTO: 0.77 10*3/MM3 (ref 0.1–0.9)
MONOCYTES # BLD AUTO: 0.77 10*3/MM3 (ref 0.1–0.9)
MONOCYTES # BLD AUTO: 0.83 10*3/MM3 (ref 0.1–0.9)
MONOCYTES # BLD AUTO: 0.86 10*3/MM3 (ref 0.1–0.9)
MONOCYTES # BLD AUTO: 0.95 10*3/MM3 (ref 0.1–0.9)
MONOCYTES # BLD AUTO: 2 10*3/MM3 (ref 0.1–0.9)
MONOCYTES NFR BLD AUTO: 22.4 % (ref 5–12)
MONOCYTES NFR BLD AUTO: 27.3 % (ref 5–12)
MONOCYTES NFR BLD AUTO: 39.1 % (ref 5–12)
MONOCYTES NFR BLD AUTO: 39.3 % (ref 5–12)
MONOCYTES NFR BLD AUTO: 40.9 % (ref 5–12)
MONOCYTES NFR BLD AUTO: 47 % (ref 5–12)
MONOCYTES NFR BLD AUTO: 53.7 % (ref 5–12)
MONOCYTES NFR BLD AUTO: 60.3 % (ref 5–12)
MONOCYTES NFR BLD AUTO: 7 % (ref 2–11)
MONOCYTES NFR BLD AUTO: ABNORMAL %
MONOCYTES NFR BLD AUTO: ABNORMAL %
MYELOCYTES NFR BLD MANUAL: 1 % (ref 0–0)
NEUTROPHILS # BLD AUTO: 0 10*3/MM3 (ref 1.7–7)
NEUTROPHILS # BLD AUTO: 0.01 10*3/MM3 (ref 1.7–7)
NEUTROPHILS # BLD AUTO: 0.02 10*3/MM3 (ref 1.7–7)
NEUTROPHILS # BLD AUTO: 0.02 10*3/MM3 (ref 1.7–7)
NEUTROPHILS # BLD AUTO: 0.03 10*3/MM3 (ref 1.7–7)
NEUTROPHILS # BLD AUTO: 0.05 10*3/MM3 (ref 1.7–7)
NEUTROPHILS # BLD AUTO: 0.06 10*3/MM3 (ref 1.7–7)
NEUTROPHILS # BLD AUTO: 0.07 10*3/MM3 (ref 1.7–7)
NEUTROPHILS # BLD AUTO: 0.07 10*3/MM3 (ref 1.7–7)
NEUTROPHILS # BLD AUTO: 0.12 10*3/MM3 (ref 1.7–7)
NEUTROPHILS # BLD AUTO: 0.14 10*3/MM3 (ref 1.7–7)
NEUTROPHILS # BLD AUTO: 0.18 10*3/MM3 (ref 1.7–7)
NEUTROPHILS # BLD AUTO: 0.34 10*3/MM3 (ref 1.7–7)
NEUTROPHILS # BLD AUTO: 2.4 10*3/UL (ref 2.3–8.6)
NEUTROPHILS NFR BLD AUTO: 0 % (ref 42.7–76)
NEUTROPHILS NFR BLD AUTO: 1 % (ref 42.7–76)
NEUTROPHILS NFR BLD AUTO: 1.2 % (ref 42.7–76)
NEUTROPHILS NFR BLD AUTO: 11.2 % (ref 42.7–76)
NEUTROPHILS NFR BLD AUTO: 2.9 % (ref 42.7–76)
NEUTROPHILS NFR BLD AUTO: 3 % (ref 42.7–76)
NEUTROPHILS NFR BLD AUTO: 3.9 % (ref 42.7–76)
NEUTROPHILS NFR BLD AUTO: 56 % (ref 50–75)
NEUTROPHILS NFR BLD AUTO: 6.7 % (ref 42.7–76)
NEUTROPHILS NFR BLD AUTO: ABNORMAL %
NEUTROPHILS NFR BLD AUTO: ABNORMAL %
NEUTROPHILS NFR BLD MANUAL: 0 % (ref 42.7–76)
NEUTROPHILS NFR BLD MANUAL: 1 % (ref 42.7–76)
NEUTROPHILS NFR BLD MANUAL: 1 % (ref 42.7–76)
NEUTROPHILS NFR BLD MANUAL: 12 % (ref 42.7–76)
NEUTROPHILS NFR BLD MANUAL: 4 % (ref 42.7–76)
NEUTROPHILS NFR BLD MANUAL: 6 % (ref 42.7–76)
NEUTROPHILS NFR BLD MANUAL: 9 % (ref 42.7–76)
NEUTS BAND NFR BLD MANUAL: 0 % (ref 0–5)
NEUTS BAND NFR BLD MANUAL: 1 % (ref 0–5)
NEUTS BAND NFR BLD MANUAL: 4 % (ref 0–5)
NEUTS BAND NFR BLD MANUAL: 8 % (ref 0–5)
NITRITE UR QL STRIP: NEGATIVE
NITRITE UR QL STRIP: NEGATIVE
NOROVIRUS GI+II RNA STL QL NAA+NON-PROBE: NOT DETECTED
NOROVIRUS GI+II RNA STL QL NAA+NON-PROBE: NOT DETECTED
NRBC BLD AUTO-RTO: 0 /100{WBCS}
NRBC BLD AUTO-RTO: 1.4 /100 WBC (ref 0–0.2)
NRBC BLD AUTO-RTO: 1.5 /100 WBC (ref 0–0.2)
NRBC SPEC MANUAL: 1 /100 WBC (ref 0–0.2)
NRBC SPEC MANUAL: 15 /100 WBC (ref 0–0.2)
NRBC SPEC MANUAL: 4 /100 WBC (ref 0–0.2)
NRBC SPEC MANUAL: 8 /100 WBC (ref 0–0.2)
NRBC SPEC MANUAL: 8 /100 WBC (ref 0–0.2)
NRBC/RBC NFR BLD MANUAL: 0 10*3/UL
P SHIGELLOIDES DNA STL QL NAA+PROBE: NOT DETECTED
P SHIGELLOIDES DNA STL QL NAA+PROBE: NOT DETECTED
PATH REPORT.FINAL DX SPEC: NORMAL
PATH REPORT.FINAL DX SPEC: NORMAL
PATHOLOGY REVIEW: YES
PATHOLOGY REVIEW: YES
PH UR STRIP.AUTO: 5.5 [PH] (ref 5–8)
PH UR STRIP.AUTO: 6 [PH] (ref 4.5–8)
PHOSPHATE SERPL-MCNC: 3 MG/DL (ref 2.4–4.7)
PHOSPHATE SERPL-MCNC: 3.2 MG/DL (ref 2.4–4.7)
PHOSPHATE SERPL-MCNC: 3.3 MG/DL (ref 2.4–4.7)
PHOSPHATE SERPL-MCNC: 4.2 MG/DL (ref 2.4–4.7)
PLAT MORPH BLD: NORMAL
PLATELET # BLD AUTO: 106 10*3/MM3 (ref 140–450)
PLATELET # BLD AUTO: 116 10*3/MM3 (ref 140–450)
PLATELET # BLD AUTO: 139 10*3/MM3 (ref 140–450)
PLATELET # BLD AUTO: 139 10*3/MM3 (ref 140–450)
PLATELET # BLD AUTO: 15 10*3/MM3 (ref 140–450)
PLATELET # BLD AUTO: 19 10*3/MM3 (ref 140–450)
PLATELET # BLD AUTO: 19 10*3/MM3 (ref 140–450)
PLATELET # BLD AUTO: 201 10*3/UL (ref 150–450)
PLATELET # BLD AUTO: 21 10*3/MM3 (ref 140–450)
PLATELET # BLD AUTO: 22 10*3/MM3 (ref 140–450)
PLATELET # BLD AUTO: 24 10*3/MM3 (ref 140–450)
PLATELET # BLD AUTO: 25 10*3/MM3 (ref 140–450)
PLATELET # BLD AUTO: 30 10*3/MM3 (ref 140–450)
PLATELET # BLD AUTO: 32 10*3/MM3 (ref 140–450)
PLATELET # BLD AUTO: 33 10*3/MM3 (ref 140–450)
PLATELET # BLD AUTO: 33 10*3/MM3 (ref 140–450)
PLATELET # BLD AUTO: 34 10*3/MM3 (ref 140–450)
PLATELET # BLD AUTO: 34 10*3/MM3 (ref 140–450)
PLATELET # BLD AUTO: 35 10*3/MM3 (ref 140–450)
PLATELET # BLD AUTO: 39 10*3/MM3 (ref 140–450)
PLATELET # BLD AUTO: 40 10*3/MM3 (ref 140–450)
PLATELET # BLD AUTO: 44 10*3/MM3 (ref 140–450)
PLATELET # BLD AUTO: 47 10*3/MM3 (ref 140–450)
PLATELET # BLD AUTO: 51 10*3/MM3 (ref 140–450)
PLATELET # BLD AUTO: 82 10*3/MM3 (ref 140–450)
PLATELET # BLD AUTO: 89 10*3/MM3 (ref 140–450)
PMV BLD AUTO: 10 FL (ref 6–12)
PMV BLD AUTO: 11.4 FL (ref 6–12)
PMV BLD AUTO: 11.6 FL (ref 6–12)
PMV BLD AUTO: 6.8 FL (ref 6–12)
PMV BLD AUTO: 7.1 FL (ref 6–12)
PMV BLD AUTO: 7.3 FL (ref 7.4–10.4)
PMV BLD AUTO: 7.4 FL (ref 6–12)
PMV BLD AUTO: 8 FL (ref 6–12)
PMV BLD AUTO: 8 FL (ref 6–12)
PMV BLD AUTO: 8.1 FL (ref 6–12)
PMV BLD AUTO: 8.3 FL (ref 6–12)
PMV BLD AUTO: 8.4 FL (ref 6–12)
PMV BLD AUTO: 8.4 FL (ref 6–12)
PMV BLD AUTO: 8.5 FL (ref 6–12)
PMV BLD AUTO: 8.6 FL (ref 6–12)
PMV BLD AUTO: 8.7 FL (ref 6–12)
PMV BLD AUTO: 8.7 FL (ref 6–12)
PMV BLD AUTO: 8.8 FL (ref 6–12)
PMV BLD AUTO: 8.9 FL (ref 6–12)
PMV BLD AUTO: 9 FL (ref 6–12)
PMV BLD AUTO: 9 FL (ref 6–12)
PMV BLD AUTO: 9.1 FL (ref 6–12)
PMV BLD AUTO: 9.2 FL (ref 6–12)
PMV BLD AUTO: 9.3 FL (ref 6–12)
PMV BLD AUTO: 9.4 FL (ref 6–12)
PMV BLD AUTO: 9.5 FL (ref 6–12)
PMV BLD AUTO: 9.5 FL (ref 6–12)
POIKILOCYTOSIS BLD QL SMEAR: ABNORMAL
POIKILOCYTOSIS BLD QL SMEAR: ABNORMAL
POTASSIUM BLD-SCNC: 2.7 MMOL/L (ref 3.6–5.1)
POTASSIUM BLD-SCNC: 2.7 MMOL/L (ref 3.6–5.1)
POTASSIUM BLD-SCNC: 3.1 MMOL/L (ref 3.6–5.1)
POTASSIUM BLD-SCNC: 3.1 MMOL/L (ref 3.6–5.1)
POTASSIUM BLD-SCNC: 3.2 MMOL/L (ref 3.6–5.1)
POTASSIUM BLD-SCNC: 3.2 MMOL/L (ref 3.6–5.1)
POTASSIUM BLD-SCNC: 3.4 MMOL/L (ref 3.6–5.1)
POTASSIUM BLD-SCNC: 3.5 MMOL/L (ref 3.6–5.1)
POTASSIUM BLD-SCNC: 3.6 MMOL/L (ref 3.6–5.1)
POTASSIUM BLD-SCNC: 3.6 MMOL/L (ref 3.6–5.1)
POTASSIUM BLD-SCNC: 3.7 MMOL/L (ref 3.6–5.1)
POTASSIUM BLD-SCNC: 3.7 MMOL/L (ref 3.6–5.1)
POTASSIUM BLD-SCNC: 3.9 MMOL/L (ref 3.6–5.1)
POTASSIUM BLD-SCNC: 4 MMOL/L (ref 3.6–5.1)
POTASSIUM BLD-SCNC: 4.4 MMOL/L (ref 3.6–5.1)
POTASSIUM SERPL-SCNC: 4.6 MMOL/L (ref 3.6–5.1)
PROCALCITONIN SERPL-MCNC: 0.15 NG/ML (ref 0–0.5)
PROT PATTERN SERPL ELPH-IMP: ABNORMAL
PROT SERPL-MCNC: 4.9 G/DL (ref 6.1–7.9)
PROT SERPL-MCNC: 5.3 G/DL (ref 6.1–7.9)
PROT SERPL-MCNC: 5.4 G/DL (ref 6.1–7.9)
PROT SERPL-MCNC: 5.9 G/DL (ref 6.1–7.9)
PROT SERPL-MCNC: 6.1 G/DL (ref 6.1–7.9)
PROT SERPL-MCNC: 6.4 G/DL (ref 6.1–7.9)
PROT SERPL-MCNC: 6.6 G/DL (ref 6.1–7.9)
PROT SERPL-MCNC: 7 G/DL (ref 6–8.5)
PROT SERPL-MCNC: 7.3 G/DL (ref 6.1–7.9)
PROT UR QL STRIP: ABNORMAL
PROTHROMBIN TIME: 14.2 SECONDS (ref 9.6–11.7)
RBC # BLD AUTO: 2.18 10*6/MM3 (ref 4.14–5.8)
RBC # BLD AUTO: 2.24 10*6/MM3 (ref 4.14–5.8)
RBC # BLD AUTO: 2.29 10*6/MM3 (ref 4.14–5.8)
RBC # BLD AUTO: 2.3 10*6/MM3 (ref 4.14–5.8)
RBC # BLD AUTO: 2.31 10*6/MM3 (ref 4.14–5.8)
RBC # BLD AUTO: 2.31 10*6/MM3 (ref 4.14–5.8)
RBC # BLD AUTO: 2.33 10*6/MM3 (ref 4.14–5.8)
RBC # BLD AUTO: 2.36 10*6/MM3 (ref 4.14–5.8)
RBC # BLD AUTO: 2.37 10*6/MM3 (ref 4.14–5.8)
RBC # BLD AUTO: 2.37 10*6/MM3 (ref 4.14–5.8)
RBC # BLD AUTO: 2.39 10*6/MM3 (ref 4.14–5.8)
RBC # BLD AUTO: 2.4 10*6/MM3 (ref 4.14–5.8)
RBC # BLD AUTO: 2.42 10*6/MM3 (ref 4.14–5.8)
RBC # BLD AUTO: 2.43 10*6/MM3 (ref 4.14–5.8)
RBC # BLD AUTO: 2.44 10*6/MM3 (ref 4.14–5.8)
RBC # BLD AUTO: 2.48 10*6/MM3 (ref 4.14–5.8)
RBC # BLD AUTO: 2.49 10*6/MM3 (ref 4.14–5.8)
RBC # BLD AUTO: 2.5 10*6/MM3 (ref 4.14–5.8)
RBC # BLD AUTO: 2.72 10*6/MM3 (ref 4.14–5.8)
RBC # BLD AUTO: 2.76 10*6/MM3 (ref 4.14–5.8)
RBC # BLD AUTO: 2.81 10*6/MM3 (ref 4.14–5.8)
RBC # BLD AUTO: 2.82 10*6/MM3 (ref 4.14–5.8)
RBC # BLD AUTO: 2.85 10*6/MM3 (ref 4.14–5.8)
RBC # BLD AUTO: 2.85 10*6/MM3 (ref 4.14–5.8)
RBC # BLD AUTO: 3.05 10*6/MM3 (ref 4.14–5.8)
RBC # BLD AUTO: 3.05 10*6/MM3 (ref 4.14–5.8)
RBC # BLD AUTO: 4.21 10*6/UL (ref 4.6–6)
RBC # UR: ABNORMAL /HPF
RBC #/AREA URNS HPF: 0 /[HPF] (ref 0–3)
RBC MORPH BLD: NORMAL
RBC MORPH BLD: NORMAL
REF LAB TEST METHOD: ABNORMAL
RETICS # AUTO: 0.06 10*6/MM3 (ref 0.02–0.13)
RETICS/RBC NFR AUTO: 2.35 % (ref 0.7–1.9)
RH BLD: POSITIVE
RV RNA STL NAA+PROBE: NOT DETECTED
RV RNA STL NAA+PROBE: NOT DETECTED
SALMONELLA DNA SPEC QL NAA+PROBE: NOT DETECTED
SALMONELLA DNA SPEC QL NAA+PROBE: NOT DETECTED
SAPO I+II+IV+V RNA STL QL NAA+NON-PROBE: NOT DETECTED
SAPO I+II+IV+V RNA STL QL NAA+NON-PROBE: NOT DETECTED
SCAN SLIDE: NORMAL
SHIGELLA SP+EIEC IPAH STL QL NAA+PROBE: NOT DETECTED
SHIGELLA SP+EIEC IPAH STL QL NAA+PROBE: NOT DETECTED
SMALL PLATELETS BLD QL SMEAR: ABNORMAL
SODIUM BLD-SCNC: 129 MMOL/L (ref 136–144)
SODIUM BLD-SCNC: 131 MMOL/L (ref 136–144)
SODIUM BLD-SCNC: 132 MMOL/L (ref 136–144)
SODIUM BLD-SCNC: 133 MMOL/L (ref 136–144)
SODIUM BLD-SCNC: 134 MMOL/L (ref 136–144)
SODIUM BLD-SCNC: 134 MMOL/L (ref 136–144)
SODIUM BLD-SCNC: 135 MMOL/L (ref 136–144)
SODIUM BLD-SCNC: 136 MMOL/L (ref 136–144)
SODIUM BLD-SCNC: 137 MMOL/L (ref 136–144)
SODIUM BLD-SCNC: 138 MMOL/L (ref 136–144)
SODIUM BLD-SCNC: 139 MMOL/L (ref 136–144)
SODIUM BLD-SCNC: 140 MMOL/L (ref 136–144)
SODIUM BLD-SCNC: 142 MMOL/L (ref 136–144)
SODIUM SERPL-SCNC: 137 MMOL/L (ref 136–144)
SP GR UR STRIP: >=1.03 (ref 1–1.03)
SP GR UR: 1.02 (ref 1–1.03)
SPERM URNS QL MICRO: ABNORMAL /[HPF]
SPHEROCYTES BLD QL SMEAR: ABNORMAL
SQUAMOUS #/AREA URNS HPF: ABNORMAL /HPF
SQUAMOUS SPT QL MICRO: 1 /[HPF] (ref 0–5)
T&S EXPIRATION DATE: NORMAL
UNIDENT CRYS URNS QL MICRO: ABNORMAL /[HPF]
UNIT  ABO: NORMAL
UNIT  RH: NORMAL
UROBILINOGEN UR QL STRIP: ABNORMAL
V CHOLERAE DNA SPEC QL NAA+PROBE: NOT DETECTED
V CHOLERAE DNA SPEC QL NAA+PROBE: NOT DETECTED
VARIANT LYMPHS NFR BLD MANUAL: 2 % (ref 0–5)
VARIANT LYMPHS NFR BLD MANUAL: 5 % (ref 0–5)
VIBRIO DNA SPEC NAA+PROBE: NOT DETECTED
VIBRIO DNA SPEC NAA+PROBE: NOT DETECTED
VIT B12 BLD-MCNC: 805 PG/ML (ref 180–914)
WBC # BLD AUTO: 4.3 10*3/UL (ref 4.5–11.5)
WBC #/AREA URNS HPF: 1 /[HPF] (ref 0–5)
WBC MORPH BLD: NORMAL
WBC NRBC COR # BLD: 0.67 10*3/MM3 (ref 3.4–10.8)
WBC NRBC COR # BLD: 0.7 10*3/MM3 (ref 3.4–10.8)
WBC NRBC COR # BLD: 0.8 10*3/MM3 (ref 3.4–10.8)
WBC NRBC COR # BLD: 0.9 10*3/MM3 (ref 3.4–10.8)
WBC NRBC COR # BLD: 0.9 10*3/MM3 (ref 3.4–10.8)
WBC NRBC COR # BLD: 1.6 10*3/MM3 (ref 3.4–10.8)
WBC NRBC COR # BLD: 1.61 10*3/MM3 (ref 3.4–10.8)
WBC NRBC COR # BLD: 1.68 10*3/MM3 (ref 3.4–10.8)
WBC NRBC COR # BLD: 1.7 10*3/MM3 (ref 3.4–10.8)
WBC NRBC COR # BLD: 1.7 10*3/MM3 (ref 3.4–10.8)
WBC NRBC COR # BLD: 1.77 10*3/MM3 (ref 3.4–10.8)
WBC NRBC COR # BLD: 1.8 10*3/MM3 (ref 3.4–10.8)
WBC NRBC COR # BLD: 1.83 10*3/MM3 (ref 3.4–10.8)
WBC NRBC COR # BLD: 1.84 10*3/MM3 (ref 3.4–10.8)
WBC NRBC COR # BLD: 1.84 10*3/MM3 (ref 3.4–10.8)
WBC NRBC COR # BLD: 2.03 10*3/MM3 (ref 3.4–10.8)
WBC NRBC COR # BLD: 2.67 10*3/MM3 (ref 3.4–10.8)
WBC NRBC COR # BLD: 3.3 10*3/MM3 (ref 3.4–10.8)
WBC UR QL AUTO: ABNORMAL /HPF
YEAST SPEC QL WET PREP: ABNORMAL /[HPF]
YERSINIA STL CULT: NOT DETECTED
YERSINIA STL CULT: NOT DETECTED

## 2019-01-01 PROCEDURE — 25010000002 ONDANSETRON PER 1 MG: Performed by: NURSE PRACTITIONER

## 2019-01-01 PROCEDURE — G0378 HOSPITAL OBSERVATION PER HR: HCPCS

## 2019-01-01 PROCEDURE — P9016 RBC LEUKOCYTES REDUCED: HCPCS

## 2019-01-01 PROCEDURE — 87040 BLOOD CULTURE FOR BACTERIA: CPT | Performed by: INTERNAL MEDICINE

## 2019-01-01 PROCEDURE — 85025 COMPLETE CBC W/AUTO DIFF WBC: CPT | Performed by: NURSE PRACTITIONER

## 2019-01-01 PROCEDURE — 97530 THERAPEUTIC ACTIVITIES: CPT

## 2019-01-01 PROCEDURE — 93010 ELECTROCARDIOGRAM REPORT: CPT | Performed by: INTERNAL MEDICINE

## 2019-01-01 PROCEDURE — 99232 SBSQ HOSP IP/OBS MODERATE 35: CPT | Performed by: INTERNAL MEDICINE

## 2019-01-01 PROCEDURE — 36415 COLL VENOUS BLD VENIPUNCTURE: CPT | Performed by: INTERNAL MEDICINE

## 2019-01-01 PROCEDURE — 36415 COLL VENOUS BLD VENIPUNCTURE: CPT | Performed by: NURSE PRACTITIONER

## 2019-01-01 PROCEDURE — 85025 COMPLETE CBC W/AUTO DIFF WBC: CPT | Performed by: INTERNAL MEDICINE

## 2019-01-01 PROCEDURE — 85007 BL SMEAR W/DIFF WBC COUNT: CPT | Performed by: EMERGENCY MEDICINE

## 2019-01-01 PROCEDURE — 87040 BLOOD CULTURE FOR BACTERIA: CPT | Performed by: NURSE PRACTITIONER

## 2019-01-01 PROCEDURE — 05HY33Z INSERTION OF INFUSION DEVICE INTO UPPER VEIN, PERCUTANEOUS APPROACH: ICD-10-PCS | Performed by: INTERNAL MEDICINE

## 2019-01-01 PROCEDURE — 85018 HEMOGLOBIN: CPT | Performed by: INTERNAL MEDICINE

## 2019-01-01 PROCEDURE — 85014 HEMATOCRIT: CPT | Performed by: INTERNAL MEDICINE

## 2019-01-01 PROCEDURE — 86923 COMPATIBILITY TEST ELECTRIC: CPT

## 2019-01-01 PROCEDURE — 25010000002 DECITABINE PER 1 MG: Performed by: INTERNAL MEDICINE

## 2019-01-01 PROCEDURE — 93005 ELECTROCARDIOGRAM TRACING: CPT | Performed by: INTERNAL MEDICINE

## 2019-01-01 PROCEDURE — 36430 TRANSFUSION BLD/BLD COMPNT: CPT

## 2019-01-01 PROCEDURE — 86334 IMMUNOFIX E-PHORESIS SERUM: CPT | Performed by: INTERNAL MEDICINE

## 2019-01-01 PROCEDURE — 80053 COMPREHEN METABOLIC PANEL: CPT | Performed by: NURSE PRACTITIONER

## 2019-01-01 PROCEDURE — 83735 ASSAY OF MAGNESIUM: CPT | Performed by: NURSE PRACTITIONER

## 2019-01-01 PROCEDURE — 97535 SELF CARE MNGMENT TRAINING: CPT

## 2019-01-01 PROCEDURE — 87449 NOS EACH ORGANISM AG IA: CPT | Performed by: NURSE PRACTITIONER

## 2019-01-01 PROCEDURE — 86901 BLOOD TYPING SEROLOGIC RH(D): CPT | Performed by: EMERGENCY MEDICINE

## 2019-01-01 PROCEDURE — 82962 GLUCOSE BLOOD TEST: CPT

## 2019-01-01 PROCEDURE — P9035 PLATELET PHERES LEUKOREDUCED: HCPCS

## 2019-01-01 PROCEDURE — 94799 UNLISTED PULMONARY SVC/PX: CPT

## 2019-01-01 PROCEDURE — 86901 BLOOD TYPING SEROLOGIC RH(D): CPT | Performed by: NURSE PRACTITIONER

## 2019-01-01 PROCEDURE — 83010 ASSAY OF HAPTOGLOBIN QUANT: CPT | Performed by: INTERNAL MEDICINE

## 2019-01-01 PROCEDURE — 99233 SBSQ HOSP IP/OBS HIGH 50: CPT | Performed by: INTERNAL MEDICINE

## 2019-01-01 PROCEDURE — 85014 HEMATOCRIT: CPT | Performed by: FAMILY MEDICINE

## 2019-01-01 PROCEDURE — 86900 BLOOD TYPING SEROLOGIC ABO: CPT

## 2019-01-01 PROCEDURE — 96361 HYDRATE IV INFUSION ADD-ON: CPT | Performed by: INTERNAL MEDICINE

## 2019-01-01 PROCEDURE — 96413 CHEMO IV INFUSION 1 HR: CPT | Performed by: INTERNAL MEDICINE

## 2019-01-01 PROCEDURE — 25010000003 LIDOCAINE 1 % SOLUTION

## 2019-01-01 PROCEDURE — 96365 THER/PROPH/DIAG IV INF INIT: CPT | Performed by: NURSE PRACTITIONER

## 2019-01-01 PROCEDURE — 84132 ASSAY OF SERUM POTASSIUM: CPT | Performed by: INTERNAL MEDICINE

## 2019-01-01 PROCEDURE — 85652 RBC SED RATE AUTOMATED: CPT | Performed by: INTERNAL MEDICINE

## 2019-01-01 PROCEDURE — 80053 COMPREHEN METABOLIC PANEL: CPT | Performed by: FAMILY MEDICINE

## 2019-01-01 PROCEDURE — 80053 COMPREHEN METABOLIC PANEL: CPT | Performed by: INTERNAL MEDICINE

## 2019-01-01 PROCEDURE — P9040 RBC LEUKOREDUCED IRRADIATED: HCPCS

## 2019-01-01 PROCEDURE — 97162 PT EVAL MOD COMPLEX 30 MIN: CPT

## 2019-01-01 PROCEDURE — 96365 THER/PROPH/DIAG IV INF INIT: CPT | Performed by: INTERNAL MEDICINE

## 2019-01-01 PROCEDURE — 96366 THER/PROPH/DIAG IV INF ADDON: CPT | Performed by: INTERNAL MEDICINE

## 2019-01-01 PROCEDURE — 85018 HEMOGLOBIN: CPT | Performed by: FAMILY MEDICINE

## 2019-01-01 PROCEDURE — 86900 BLOOD TYPING SEROLOGIC ABO: CPT | Performed by: EMERGENCY MEDICINE

## 2019-01-01 PROCEDURE — 71046 X-RAY EXAM CHEST 2 VIEWS: CPT

## 2019-01-01 PROCEDURE — 97116 GAIT TRAINING THERAPY: CPT

## 2019-01-01 PROCEDURE — P9037 PLATE PHERES LEUKOREDU IRRAD: HCPCS

## 2019-01-01 PROCEDURE — 84132 ASSAY OF SERUM POTASSIUM: CPT | Performed by: NURSE PRACTITIONER

## 2019-01-01 PROCEDURE — 80048 BASIC METABOLIC PNL TOTAL CA: CPT | Performed by: NURSE PRACTITIONER

## 2019-01-01 PROCEDURE — 86901 BLOOD TYPING SEROLOGIC RH(D): CPT

## 2019-01-01 PROCEDURE — 85025 COMPLETE CBC W/AUTO DIFF WBC: CPT

## 2019-01-01 PROCEDURE — 85007 BL SMEAR W/DIFF WBC COUNT: CPT | Performed by: INTERNAL MEDICINE

## 2019-01-01 PROCEDURE — 25010000002 PIPERACILLIN SOD-TAZOBACTAM PER 1 G: Performed by: INTERNAL MEDICINE

## 2019-01-01 PROCEDURE — 93005 ELECTROCARDIOGRAM TRACING: CPT | Performed by: EMERGENCY MEDICINE

## 2019-01-01 PROCEDURE — 85007 BL SMEAR W/DIFF WBC COUNT: CPT | Performed by: NURSE PRACTITIONER

## 2019-01-01 PROCEDURE — 84100 ASSAY OF PHOSPHORUS: CPT | Performed by: NURSE PRACTITIONER

## 2019-01-01 PROCEDURE — 99215 OFFICE O/P EST HI 40 MIN: CPT | Performed by: INTERNAL MEDICINE

## 2019-01-01 PROCEDURE — 85045 AUTOMATED RETICULOCYTE COUNT: CPT | Performed by: INTERNAL MEDICINE

## 2019-01-01 PROCEDURE — 85730 THROMBOPLASTIN TIME PARTIAL: CPT | Performed by: EMERGENCY MEDICINE

## 2019-01-01 PROCEDURE — 99222 1ST HOSP IP/OBS MODERATE 55: CPT | Performed by: NURSE PRACTITIONER

## 2019-01-01 PROCEDURE — 86900 BLOOD TYPING SEROLOGIC ABO: CPT | Performed by: PHYSICIAN ASSISTANT

## 2019-01-01 PROCEDURE — 25010000002 ONDANSETRON PER 1 MG: Performed by: INTERNAL MEDICINE

## 2019-01-01 PROCEDURE — 86850 RBC ANTIBODY SCREEN: CPT | Performed by: NURSE PRACTITIONER

## 2019-01-01 PROCEDURE — 63710000001 DIPHENHYDRAMINE PER 50 MG: Performed by: PHYSICIAN ASSISTANT

## 2019-01-01 PROCEDURE — 97110 THERAPEUTIC EXERCISES: CPT

## 2019-01-01 PROCEDURE — 25010000002 PIPERACILLIN SOD-TAZOBACTAM PER 1 G: Performed by: NURSE PRACTITIONER

## 2019-01-01 PROCEDURE — 0097U HC BIOFIRE FILMARRAY GI PANEL: CPT | Performed by: INTERNAL MEDICINE

## 2019-01-01 PROCEDURE — 81015 MICROSCOPIC EXAM OF URINE: CPT | Performed by: INTERNAL MEDICINE

## 2019-01-01 PROCEDURE — 87449 NOS EACH ORGANISM AG IA: CPT | Performed by: INTERNAL MEDICINE

## 2019-01-01 PROCEDURE — 85025 COMPLETE CBC W/AUTO DIFF WBC: CPT | Performed by: EMERGENCY MEDICINE

## 2019-01-01 PROCEDURE — 81003 URINALYSIS AUTO W/O SCOPE: CPT | Performed by: INTERNAL MEDICINE

## 2019-01-01 PROCEDURE — 36415 COLL VENOUS BLD VENIPUNCTURE: CPT

## 2019-01-01 PROCEDURE — 87324 CLOSTRIDIUM AG IA: CPT | Performed by: NURSE PRACTITIONER

## 2019-01-01 PROCEDURE — 71045 X-RAY EXAM CHEST 1 VIEW: CPT

## 2019-01-01 PROCEDURE — 99284 EMERGENCY DEPT VISIT MOD MDM: CPT

## 2019-01-01 PROCEDURE — 99233 SBSQ HOSP IP/OBS HIGH 50: CPT | Performed by: FAMILY MEDICINE

## 2019-01-01 PROCEDURE — 86900 BLOOD TYPING SEROLOGIC ABO: CPT | Performed by: INTERNAL MEDICINE

## 2019-01-01 PROCEDURE — 74018 RADEX ABDOMEN 1 VIEW: CPT

## 2019-01-01 PROCEDURE — 86850 RBC ANTIBODY SCREEN: CPT | Performed by: INTERNAL MEDICINE

## 2019-01-01 PROCEDURE — 99238 HOSP IP/OBS DSCHRG MGMT 30/<: CPT | Performed by: FAMILY MEDICINE

## 2019-01-01 PROCEDURE — 85610 PROTHROMBIN TIME: CPT | Performed by: EMERGENCY MEDICINE

## 2019-01-01 PROCEDURE — 85018 HEMOGLOBIN: CPT | Performed by: NURSE PRACTITIONER

## 2019-01-01 PROCEDURE — 86900 BLOOD TYPING SEROLOGIC ABO: CPT | Performed by: NURSE PRACTITIONER

## 2019-01-01 PROCEDURE — 80053 COMPREHEN METABOLIC PANEL: CPT | Performed by: EMERGENCY MEDICINE

## 2019-01-01 PROCEDURE — 38220 DX BONE MARROW ASPIRATIONS: CPT | Performed by: INTERNAL MEDICINE

## 2019-01-01 PROCEDURE — 84145 PROCALCITONIN (PCT): CPT | Performed by: FAMILY MEDICINE

## 2019-01-01 PROCEDURE — 99232 SBSQ HOSP IP/OBS MODERATE 35: CPT | Performed by: FAMILY MEDICINE

## 2019-01-01 PROCEDURE — 86901 BLOOD TYPING SEROLOGIC RH(D): CPT | Performed by: PHYSICIAN ASSISTANT

## 2019-01-01 PROCEDURE — 38222 DX BONE MARROW BX & ASPIR: CPT | Performed by: INTERNAL MEDICINE

## 2019-01-01 PROCEDURE — 99205 OFFICE O/P NEW HI 60 MIN: CPT | Performed by: INTERNAL MEDICINE

## 2019-01-01 PROCEDURE — 99239 HOSP IP/OBS DSCHRG MGMT >30: CPT | Performed by: INTERNAL MEDICINE

## 2019-01-01 PROCEDURE — 86850 RBC ANTIBODY SCREEN: CPT | Performed by: PHYSICIAN ASSISTANT

## 2019-01-01 PROCEDURE — G0463 HOSPITAL OUTPT CLINIC VISIT: HCPCS | Performed by: INTERNAL MEDICINE

## 2019-01-01 PROCEDURE — 82607 VITAMIN B-12: CPT | Performed by: INTERNAL MEDICINE

## 2019-01-01 PROCEDURE — 87324 CLOSTRIDIUM AG IA: CPT | Performed by: INTERNAL MEDICINE

## 2019-01-01 PROCEDURE — 25010000002 PIPERACILLIN SOD-TAZOBACTAM PER 1 G: Performed by: EMERGENCY MEDICINE

## 2019-01-01 PROCEDURE — 99223 1ST HOSP IP/OBS HIGH 75: CPT | Performed by: NURSE PRACTITIONER

## 2019-01-01 PROCEDURE — 83883 ASSAY NEPHELOMETRY NOT SPEC: CPT | Performed by: INTERNAL MEDICINE

## 2019-01-01 PROCEDURE — 97166 OT EVAL MOD COMPLEX 45 MIN: CPT

## 2019-01-01 PROCEDURE — 85049 AUTOMATED PLATELET COUNT: CPT | Performed by: INTERNAL MEDICINE

## 2019-01-01 PROCEDURE — C1751 CATH, INF, PER/CENT/MIDLINE: HCPCS

## 2019-01-01 PROCEDURE — 99285 EMERGENCY DEPT VISIT HI MDM: CPT

## 2019-01-01 PROCEDURE — 0097U HC BIOFIRE FILMARRAY GI PANEL: CPT | Performed by: NURSE PRACTITIONER

## 2019-01-01 PROCEDURE — 86901 BLOOD TYPING SEROLOGIC RH(D): CPT | Performed by: INTERNAL MEDICINE

## 2019-01-01 PROCEDURE — 63710000001 DIPHENHYDRAMINE PER 50 MG: Performed by: EMERGENCY MEDICINE

## 2019-01-01 PROCEDURE — 86850 RBC ANTIBODY SCREEN: CPT | Performed by: EMERGENCY MEDICINE

## 2019-01-01 PROCEDURE — 97112 NEUROMUSCULAR REEDUCATION: CPT

## 2019-01-01 PROCEDURE — 99223 1ST HOSP IP/OBS HIGH 75: CPT | Performed by: INTERNAL MEDICINE

## 2019-01-01 PROCEDURE — 63710000001 DIPHENHYDRAMINE PER 50 MG: Performed by: INTERNAL MEDICINE

## 2019-01-01 PROCEDURE — 74176 CT ABD & PELVIS W/O CONTRAST: CPT

## 2019-01-01 PROCEDURE — 85014 HEMATOCRIT: CPT | Performed by: NURSE PRACTITIONER

## 2019-01-01 RX ORDER — LEVOTHYROXINE SODIUM 0.07 MG/1
TABLET ORAL
Status: ON HOLD | COMMUNITY
Start: 2013-09-05 | End: 2019-01-01

## 2019-01-01 RX ORDER — ALLOPURINOL 300 MG/1
300 TABLET ORAL DAILY
Status: CANCELLED | OUTPATIENT
Start: 2019-01-01

## 2019-01-01 RX ORDER — SODIUM CHLORIDE 9 MG/ML
250 INJECTION, SOLUTION INTRAVENOUS ONCE
Status: CANCELLED | OUTPATIENT
Start: 2019-01-01

## 2019-01-01 RX ORDER — HYDRALAZINE HYDROCHLORIDE 50 MG/1
TABLET, FILM COATED ORAL
Status: ON HOLD | COMMUNITY
Start: 2013-09-05 | End: 2019-01-01

## 2019-01-01 RX ORDER — ONDANSETRON 2 MG/ML
4 INJECTION INTRAMUSCULAR; INTRAVENOUS EVERY 6 HOURS PRN
Status: CANCELLED | OUTPATIENT
Start: 2019-01-01

## 2019-01-01 RX ORDER — LEVOTHYROXINE SODIUM 0.07 MG/1
75 TABLET ORAL
Status: DISCONTINUED | OUTPATIENT
Start: 2019-01-01 | End: 2019-01-01 | Stop reason: HOSPADM

## 2019-01-01 RX ORDER — ERGOCALCIFEROL 1.25 MG/1
50000 CAPSULE ORAL WEEKLY
COMMUNITY

## 2019-01-01 RX ORDER — CLOTRIMAZOLE 10 MG/1
10 LOZENGE ORAL; TOPICAL 3 TIMES DAILY
Qty: 30 TABLET | Refills: 1 | Status: ON HOLD | OUTPATIENT
Start: 2019-01-01 | End: 2019-01-01

## 2019-01-01 RX ORDER — FLUCONAZOLE 100 MG/1
200 TABLET ORAL DAILY
Status: DISCONTINUED | OUTPATIENT
Start: 2019-01-01 | End: 2019-01-01 | Stop reason: HOSPADM

## 2019-01-01 RX ORDER — CIPROFLOXACIN 500 MG/1
500 TABLET, FILM COATED ORAL 2 TIMES DAILY
Qty: 14 TABLET | Refills: 0 | Status: SHIPPED | OUTPATIENT
Start: 2019-01-01 | End: 2019-01-01

## 2019-01-01 RX ORDER — ONDANSETRON 2 MG/ML
4 INJECTION INTRAMUSCULAR; INTRAVENOUS EVERY 6 HOURS PRN
Status: DISCONTINUED | OUTPATIENT
Start: 2019-01-01 | End: 2019-01-01 | Stop reason: HOSPADM

## 2019-01-01 RX ORDER — DIPHENHYDRAMINE HCL 25 MG
25 TABLET ORAL ONCE
Status: COMPLETED | OUTPATIENT
Start: 2019-01-01 | End: 2019-01-01

## 2019-01-01 RX ORDER — SODIUM CHLORIDE 0.9 % (FLUSH) 0.9 %
10 SYRINGE (ML) INJECTION EVERY 12 HOURS SCHEDULED
Status: DISCONTINUED | OUTPATIENT
Start: 2019-01-01 | End: 2019-01-01 | Stop reason: HOSPADM

## 2019-01-01 RX ORDER — SODIUM CHLORIDE 9 MG/ML
75 INJECTION, SOLUTION INTRAVENOUS CONTINUOUS
Status: CANCELLED | OUTPATIENT
Start: 2019-01-01

## 2019-01-01 RX ORDER — MULTIVITAMIN,THERAPEUTIC
1 TABLET ORAL DAILY
Status: DISCONTINUED | OUTPATIENT
Start: 2019-01-01 | End: 2019-01-01 | Stop reason: HOSPADM

## 2019-01-01 RX ORDER — ONDANSETRON 4 MG/1
4 TABLET, FILM COATED ORAL EVERY 6 HOURS PRN
Status: DISCONTINUED | OUTPATIENT
Start: 2019-01-01 | End: 2019-01-01 | Stop reason: HOSPADM

## 2019-01-01 RX ORDER — SODIUM CHLORIDE 9 MG/ML
250 INJECTION, SOLUTION INTRAVENOUS ONCE
Status: DISCONTINUED | OUTPATIENT
Start: 2019-01-01 | End: 2019-01-01 | Stop reason: HOSPADM

## 2019-01-01 RX ORDER — DIPHENHYDRAMINE HCL 25 MG
TABLET ORAL
Status: DISPENSED
Start: 2019-01-01 | End: 2019-01-01

## 2019-01-01 RX ORDER — SODIUM CHLORIDE 9 MG/ML
100 INJECTION, SOLUTION INTRAVENOUS CONTINUOUS
Status: DISCONTINUED | OUTPATIENT
Start: 2019-01-01 | End: 2019-01-01

## 2019-01-01 RX ORDER — HYDRALAZINE HYDROCHLORIDE 50 MG/1
50 TABLET, FILM COATED ORAL 3 TIMES DAILY
COMMUNITY
End: 2019-01-01 | Stop reason: HOSPADM

## 2019-01-01 RX ORDER — FLUCONAZOLE 200 MG/1
200 TABLET ORAL DAILY
Qty: 30 TABLET | Refills: 0 | Status: SHIPPED | OUTPATIENT
Start: 2019-01-01 | End: 2019-01-01

## 2019-01-01 RX ORDER — SULFAMETHOXAZOLE AND TRIMETHOPRIM 800; 160 MG/1; MG/1
1 TABLET ORAL DAILY
Status: DISCONTINUED | OUTPATIENT
Start: 2019-01-01 | End: 2019-01-01 | Stop reason: HOSPADM

## 2019-01-01 RX ORDER — ATORVASTATIN CALCIUM 40 MG/1
40 TABLET, FILM COATED ORAL NIGHTLY
Status: DISCONTINUED | OUTPATIENT
Start: 2019-01-01 | End: 2019-01-01 | Stop reason: HOSPADM

## 2019-01-01 RX ORDER — ACETAMINOPHEN 325 MG/1
TABLET ORAL
Status: DISPENSED
Start: 2019-01-01 | End: 2019-01-01

## 2019-01-01 RX ORDER — SODIUM CHLORIDE 9 MG/ML
250 INJECTION, SOLUTION INTRAVENOUS AS NEEDED
Status: DISCONTINUED | OUTPATIENT
Start: 2019-01-01 | End: 2019-01-01 | Stop reason: HOSPADM

## 2019-01-01 RX ORDER — ACETAMINOPHEN 325 MG/1
650 TABLET ORAL EVERY 4 HOURS PRN
Status: DISCONTINUED | OUTPATIENT
Start: 2019-01-01 | End: 2019-01-01 | Stop reason: HOSPADM

## 2019-01-01 RX ORDER — SULFAMETHOXAZOLE AND TRIMETHOPRIM 800; 160 MG/1; MG/1
1 TABLET ORAL DAILY
Qty: 30 TABLET | Refills: 0 | Status: SHIPPED | OUTPATIENT
Start: 2019-01-01

## 2019-01-01 RX ORDER — LANOLIN ALCOHOL/MO/W.PET/CERES
1000 CREAM (GRAM) TOPICAL DAILY
Status: DISCONTINUED | OUTPATIENT
Start: 2019-01-01 | End: 2019-01-01 | Stop reason: HOSPADM

## 2019-01-01 RX ORDER — CIPROFLOXACIN 500 MG/1
500 TABLET, FILM COATED ORAL 2 TIMES DAILY
Qty: 14 TABLET | Refills: 0 | Status: SHIPPED | OUTPATIENT
Start: 2019-01-01 | End: 2019-01-01 | Stop reason: HOSPADM

## 2019-01-01 RX ORDER — LOPERAMIDE HYDROCHLORIDE 2 MG/1
2 CAPSULE ORAL 4 TIMES DAILY PRN
Qty: 40 CAPSULE | Refills: 0 | Status: SHIPPED | OUTPATIENT
Start: 2019-01-01

## 2019-01-01 RX ORDER — DIPHENOXYLATE HYDROCHLORIDE AND ATROPINE SULFATE 2.5; .025 MG/1; MG/1
2 TABLET ORAL 4 TIMES DAILY PRN
Qty: 40 TABLET | Refills: 0 | Status: SHIPPED | OUTPATIENT
Start: 2019-01-01

## 2019-01-01 RX ORDER — ACETAMINOPHEN 650 MG/1
650 SUPPOSITORY RECTAL ONCE
Status: COMPLETED | OUTPATIENT
Start: 2019-01-01 | End: 2019-01-01

## 2019-01-01 RX ORDER — CLOTRIMAZOLE 10 MG/1
10 LOZENGE ORAL; TOPICAL 3 TIMES DAILY PRN
COMMUNITY

## 2019-01-01 RX ORDER — ONDANSETRON 2 MG/ML
8 INJECTION INTRAMUSCULAR; INTRAVENOUS ONCE
Status: COMPLETED | OUTPATIENT
Start: 2019-01-01 | End: 2019-01-01

## 2019-01-01 RX ORDER — ACYCLOVIR 400 MG/1
400 TABLET ORAL 2 TIMES DAILY
COMMUNITY
Start: 2019-01-01

## 2019-01-01 RX ORDER — SODIUM CHLORIDE 0.9 % (FLUSH) 0.9 %
10 SYRINGE (ML) INJECTION AS NEEDED
Status: DISCONTINUED | OUTPATIENT
Start: 2019-01-01 | End: 2019-01-01 | Stop reason: HOSPADM

## 2019-01-01 RX ORDER — SODIUM CHLORIDE 9 MG/ML
250 INJECTION, SOLUTION INTRAVENOUS ONCE
Status: COMPLETED | OUTPATIENT
Start: 2019-01-01 | End: 2019-01-01

## 2019-01-01 RX ORDER — ACETAMINOPHEN 160 MG/5ML
650 SOLUTION ORAL EVERY 4 HOURS PRN
Status: DISCONTINUED | OUTPATIENT
Start: 2019-01-01 | End: 2019-01-01 | Stop reason: HOSPADM

## 2019-01-01 RX ORDER — BISACODYL 5 MG/1
5 TABLET, DELAYED RELEASE ORAL DAILY PRN
Status: DISCONTINUED | OUTPATIENT
Start: 2019-01-01 | End: 2019-01-01 | Stop reason: HOSPADM

## 2019-01-01 RX ORDER — ATENOLOL 25 MG/1
12.5 TABLET ORAL DAILY
Status: DISCONTINUED | OUTPATIENT
Start: 2019-01-01 | End: 2019-01-01 | Stop reason: HOSPADM

## 2019-01-01 RX ORDER — ACETAMINOPHEN 325 MG/1
650 TABLET ORAL ONCE
Status: COMPLETED | OUTPATIENT
Start: 2019-01-01 | End: 2019-01-01

## 2019-01-01 RX ORDER — ACETAMINOPHEN 650 MG/1
650 SUPPOSITORY RECTAL EVERY 4 HOURS PRN
Status: DISCONTINUED | OUTPATIENT
Start: 2019-01-01 | End: 2019-01-01 | Stop reason: HOSPADM

## 2019-01-01 RX ORDER — POTASSIUM CHLORIDE 20 MEQ/1
20 TABLET, EXTENDED RELEASE ORAL 2 TIMES DAILY WITH MEALS
Status: COMPLETED | OUTPATIENT
Start: 2019-01-01 | End: 2019-01-01

## 2019-01-01 RX ORDER — ACETAMINOPHEN 500 MG
500 TABLET ORAL EVERY 6 HOURS PRN
Status: DISCONTINUED | OUTPATIENT
Start: 2019-01-01 | End: 2019-01-01 | Stop reason: HOSPADM

## 2019-01-01 RX ORDER — ACYCLOVIR 800 MG/1
800 TABLET ORAL
Qty: 25 TABLET | Refills: 0 | Status: SHIPPED | OUTPATIENT
Start: 2019-01-01 | End: 2019-01-01

## 2019-01-01 RX ORDER — BISACODYL 10 MG
10 SUPPOSITORY, RECTAL RECTAL DAILY PRN
Status: DISCONTINUED | OUTPATIENT
Start: 2019-01-01 | End: 2019-01-01 | Stop reason: HOSPADM

## 2019-01-01 RX ORDER — SODIUM CHLORIDE, SODIUM LACTATE, POTASSIUM CHLORIDE, CALCIUM CHLORIDE 600; 310; 30; 20 MG/100ML; MG/100ML; MG/100ML; MG/100ML
125 INJECTION, SOLUTION INTRAVENOUS CONTINUOUS
Status: DISCONTINUED | OUTPATIENT
Start: 2019-01-01 | End: 2019-01-01 | Stop reason: HOSPADM

## 2019-01-01 RX ORDER — LIDOCAINE HYDROCHLORIDE 10 MG/ML
INJECTION, SOLUTION INFILTRATION; PERINEURAL
Status: COMPLETED
Start: 2019-01-01 | End: 2019-01-01

## 2019-01-01 RX ORDER — ACYCLOVIR 800 MG/1
400 TABLET ORAL 2 TIMES DAILY
Status: DISCONTINUED | OUTPATIENT
Start: 2019-01-01 | End: 2019-01-01

## 2019-01-01 RX ORDER — ACYCLOVIR 400 MG/1
400 TABLET ORAL 2 TIMES DAILY
Qty: 60 TABLET | Refills: 0 | Status: ON HOLD | OUTPATIENT
Start: 2019-01-01 | End: 2019-01-01

## 2019-01-01 RX ORDER — ONDANSETRON 4 MG/1
8 TABLET, FILM COATED ORAL EVERY 8 HOURS PRN
Status: DISCONTINUED | OUTPATIENT
Start: 2019-01-01 | End: 2019-01-01 | Stop reason: HOSPADM

## 2019-01-01 RX ORDER — DIPHENHYDRAMINE HCL 25 MG
25 TABLET ORAL EVERY 6 HOURS PRN
Status: DISCONTINUED | OUTPATIENT
Start: 2019-01-01 | End: 2019-01-01 | Stop reason: HOSPADM

## 2019-01-01 RX ORDER — CHOLECALCIFEROL (VITAMIN D3) 125 MCG
5 CAPSULE ORAL ONCE
Status: COMPLETED | OUTPATIENT
Start: 2019-01-01 | End: 2019-01-01

## 2019-01-01 RX ORDER — ACYCLOVIR 800 MG/1
400 TABLET ORAL 2 TIMES DAILY
Status: DISCONTINUED | OUTPATIENT
Start: 2019-01-01 | End: 2019-01-01 | Stop reason: HOSPADM

## 2019-01-01 RX ORDER — LEVOFLOXACIN 500 MG/1
500 TABLET, FILM COATED ORAL
Qty: 10 TABLET | Refills: 0 | Status: SHIPPED | OUTPATIENT
Start: 2019-01-01 | End: 2019-01-01

## 2019-01-01 RX ORDER — LEVOFLOXACIN 500 MG/1
500 TABLET, FILM COATED ORAL
Status: DISCONTINUED | OUTPATIENT
Start: 2019-01-01 | End: 2019-01-01 | Stop reason: HOSPADM

## 2019-01-01 RX ORDER — ATENOLOL 25 MG/1
12.5 TABLET ORAL ONCE
Status: COMPLETED | OUTPATIENT
Start: 2019-01-01 | End: 2019-01-01

## 2019-01-01 RX ORDER — ONDANSETRON 8 MG/1
8 TABLET, ORALLY DISINTEGRATING ORAL EVERY 8 HOURS PRN
Refills: 3 | COMMUNITY
Start: 2019-01-01 | End: 2019-01-01

## 2019-01-01 RX ORDER — ATORVASTATIN CALCIUM 40 MG/1
40 TABLET, FILM COATED ORAL DAILY
Status: DISCONTINUED | OUTPATIENT
Start: 2019-01-01 | End: 2019-01-01 | Stop reason: HOSPADM

## 2019-01-01 RX ORDER — ACYCLOVIR 200 MG/1
400 CAPSULE ORAL 2 TIMES DAILY
Status: DISCONTINUED | OUTPATIENT
Start: 2019-01-01 | End: 2019-01-01 | Stop reason: HOSPADM

## 2019-01-01 RX ORDER — ATENOLOL 25 MG/1
12.5 TABLET ORAL DAILY
COMMUNITY

## 2019-01-01 RX ORDER — MULTIVITAMIN
CAPSULE ORAL
Status: ON HOLD | COMMUNITY
Start: 2015-03-03 | End: 2019-01-01

## 2019-01-01 RX ORDER — SODIUM CHLORIDE 9 MG/ML
250 INJECTION, SOLUTION INTRAVENOUS AS NEEDED
Status: CANCELLED | OUTPATIENT
Start: 2019-01-01

## 2019-01-01 RX ORDER — SACCHAROMYCES BOULARDII 250 MG
500 CAPSULE ORAL 2 TIMES DAILY
Status: DISCONTINUED | OUTPATIENT
Start: 2019-01-01 | End: 2019-01-01

## 2019-01-01 RX ORDER — POTASSIUM CHLORIDE 20 MEQ/1
40 TABLET, EXTENDED RELEASE ORAL AS NEEDED
Status: DISCONTINUED | OUTPATIENT
Start: 2019-01-01 | End: 2019-01-01 | Stop reason: HOSPADM

## 2019-01-01 RX ORDER — ERGOCALCIFEROL 1.25 MG/1
CAPSULE ORAL
Refills: 1 | Status: ON HOLD | COMMUNITY
Start: 2019-01-01 | End: 2019-01-01

## 2019-01-01 RX ORDER — DIPHENOXYLATE HYDROCHLORIDE AND ATROPINE SULFATE 2.5; .025 MG/1; MG/1
2 TABLET ORAL 4 TIMES DAILY PRN
Status: DISCONTINUED | OUTPATIENT
Start: 2019-01-01 | End: 2019-01-01 | Stop reason: HOSPADM

## 2019-01-01 RX ORDER — POTASSIUM CHLORIDE 7.45 MG/ML
10 INJECTION INTRAVENOUS
Status: DISCONTINUED | OUTPATIENT
Start: 2019-01-01 | End: 2019-01-01 | Stop reason: HOSPADM

## 2019-01-01 RX ORDER — ONDANSETRON 4 MG/1
8 TABLET, ORALLY DISINTEGRATING ORAL EVERY 8 HOURS PRN
COMMUNITY

## 2019-01-01 RX ORDER — ONDANSETRON 4 MG/1
8 TABLET, FILM COATED ORAL EVERY 8 HOURS PRN
Qty: 30 TABLET | Refills: 3 | Status: ON HOLD | OUTPATIENT
Start: 2019-01-01 | End: 2019-01-01

## 2019-01-01 RX ORDER — AMOXICILLIN AND CLAVULANATE POTASSIUM 875; 125 MG/1; MG/1
1 TABLET, FILM COATED ORAL 2 TIMES DAILY
Qty: 14 TABLET | Refills: 0 | Status: SHIPPED | OUTPATIENT
Start: 2019-01-01 | End: 2019-01-01

## 2019-01-01 RX ORDER — LEVOTHYROXINE SODIUM 0.07 MG/1
75 TABLET ORAL DAILY
COMMUNITY

## 2019-01-01 RX ORDER — POTASSIUM CHLORIDE 1.5 G/1.77G
40 POWDER, FOR SOLUTION ORAL AS NEEDED
Status: DISCONTINUED | OUTPATIENT
Start: 2019-01-01 | End: 2019-01-01 | Stop reason: HOSPADM

## 2019-01-01 RX ORDER — ATENOLOL 25 MG/1
12.5 TABLET ORAL
Status: DISCONTINUED | OUTPATIENT
Start: 2019-01-01 | End: 2019-01-01 | Stop reason: HOSPADM

## 2019-01-01 RX ORDER — ATORVASTATIN CALCIUM 40 MG/1
40 TABLET, FILM COATED ORAL DAILY
COMMUNITY

## 2019-01-01 RX ORDER — ACETAMINOPHEN 500 MG
500 TABLET ORAL EVERY 6 HOURS PRN
COMMUNITY

## 2019-01-01 RX ORDER — LOPERAMIDE HYDROCHLORIDE 2 MG/1
2 CAPSULE ORAL 4 TIMES DAILY PRN
Status: DISCONTINUED | OUTPATIENT
Start: 2019-01-01 | End: 2019-01-01 | Stop reason: HOSPADM

## 2019-01-01 RX ORDER — ACYCLOVIR 800 MG/1
400 TABLET ORAL 2 TIMES DAILY
Status: COMPLETED | OUTPATIENT
Start: 2019-01-01 | End: 2019-01-01

## 2019-01-01 RX ORDER — MULTIPLE VITAMINS W/ MINERALS TAB 9MG-400MCG
1 TAB ORAL DAILY
COMMUNITY

## 2019-01-01 RX ADMIN — ACYCLOVIR 400 MG: 200 CAPSULE ORAL at 10:29

## 2019-01-01 RX ADMIN — POTASSIUM CHLORIDE 20 MEQ: 1500 TABLET, EXTENDED RELEASE ORAL at 02:03

## 2019-01-01 RX ADMIN — ATORVASTATIN CALCIUM 40 MG: 40 TABLET, FILM COATED ORAL at 08:43

## 2019-01-01 RX ADMIN — PIPERACILLIN SODIUM,TAZOBACTAM SODIUM 3.38 G: 3; .375 INJECTION, POWDER, FOR SOLUTION INTRAVENOUS at 12:19

## 2019-01-01 RX ADMIN — LEVOTHYROXINE SODIUM 75 MCG: 75 TABLET ORAL at 05:33

## 2019-01-01 RX ADMIN — FLUCONAZOLE 200 MG: 100 TABLET ORAL at 08:42

## 2019-01-01 RX ADMIN — Medication 10 ML: at 10:50

## 2019-01-01 RX ADMIN — CLOTRIMAZOLE 10 MG: 10 LOZENGE ORAL at 20:13

## 2019-01-01 RX ADMIN — FLUCONAZOLE 200 MG: 100 TABLET ORAL at 10:49

## 2019-01-01 RX ADMIN — CLOTRIMAZOLE 10 MG: 10 LOZENGE ORAL at 16:23

## 2019-01-01 RX ADMIN — CLOTRIMAZOLE 10 MG: 10 LOZENGE ORAL at 21:32

## 2019-01-01 RX ADMIN — SODIUM CHLORIDE, PRESERVATIVE FREE 10 ML: 5 INJECTION INTRAVENOUS at 02:04

## 2019-01-01 RX ADMIN — ATORVASTATIN CALCIUM 40 MG: 40 TABLET, FILM COATED ORAL at 08:40

## 2019-01-01 RX ADMIN — ATENOLOL 12.5 MG: 25 TABLET ORAL at 08:47

## 2019-01-01 RX ADMIN — POTASSIUM CHLORIDE 40 MEQ: 1500 TABLET, EXTENDED RELEASE ORAL at 06:29

## 2019-01-01 RX ADMIN — DIPHENOXYLATE HYDROCHLORIDE AND ATROPINE SULFATE 2 TABLET: 2.5; .025 TABLET ORAL at 21:09

## 2019-01-01 RX ADMIN — SODIUM CHLORIDE, SODIUM LACTATE, POTASSIUM CHLORIDE, AND CALCIUM CHLORIDE 125 ML/HR: 600; 310; 30; 20 INJECTION, SOLUTION INTRAVENOUS at 22:07

## 2019-01-01 RX ADMIN — ACYCLOVIR 400 MG: 800 TABLET ORAL at 22:03

## 2019-01-01 RX ADMIN — CLOTRIMAZOLE 10 MG: 10 LOZENGE ORAL at 21:09

## 2019-01-01 RX ADMIN — ATORVASTATIN CALCIUM 40 MG: 40 TABLET, FILM COATED ORAL at 08:30

## 2019-01-01 RX ADMIN — ATENOLOL 12.5 MG: 25 TABLET ORAL at 11:01

## 2019-01-01 RX ADMIN — SULFAMETHOXAZOLE AND TRIMETHOPRIM 160 MG: 800; 160 TABLET ORAL at 10:50

## 2019-01-01 RX ADMIN — Medication 10 ML: at 22:34

## 2019-01-01 RX ADMIN — ACETAMINOPHEN 500 MG: 500 TABLET, FILM COATED ORAL at 14:05

## 2019-01-01 RX ADMIN — SODIUM CHLORIDE, PRESERVATIVE FREE 10 ML: 5 INJECTION INTRAVENOUS at 08:52

## 2019-01-01 RX ADMIN — SODIUM CHLORIDE, PRESERVATIVE FREE 10 ML: 5 INJECTION INTRAVENOUS at 08:35

## 2019-01-01 RX ADMIN — FLUCONAZOLE 200 MG: 100 TABLET ORAL at 11:00

## 2019-01-01 RX ADMIN — DIPHENHYDRAMINE HCL 25 MG: 25 TABLET ORAL at 14:05

## 2019-01-01 RX ADMIN — LEVOFLOXACIN 500 MG: 500 TABLET, FILM COATED ORAL at 11:18

## 2019-01-01 RX ADMIN — ACYCLOVIR 400 MG: 800 TABLET ORAL at 09:03

## 2019-01-01 RX ADMIN — FLUCONAZOLE 200 MG: 100 TABLET ORAL at 08:33

## 2019-01-01 RX ADMIN — CLOTRIMAZOLE 10 MG: 10 LOZENGE ORAL at 20:27

## 2019-01-01 RX ADMIN — CLOTRIMAZOLE 10 MG: 10 LOZENGE ORAL at 17:31

## 2019-01-01 RX ADMIN — SODIUM CHLORIDE, PRESERVATIVE FREE 10 ML: 5 INJECTION INTRAVENOUS at 21:33

## 2019-01-01 RX ADMIN — POTASSIUM CHLORIDE 40 MEQ: 1500 TABLET, EXTENDED RELEASE ORAL at 13:38

## 2019-01-01 RX ADMIN — ACYCLOVIR 400 MG: 800 TABLET ORAL at 20:11

## 2019-01-01 RX ADMIN — CYANOCOBALAMIN TAB 1000 MCG 1000 MCG: 1000 TAB at 08:47

## 2019-01-01 RX ADMIN — SODIUM CHLORIDE, SODIUM LACTATE, POTASSIUM CHLORIDE, AND CALCIUM CHLORIDE 125 ML/HR: 600; 310; 30; 20 INJECTION, SOLUTION INTRAVENOUS at 02:15

## 2019-01-01 RX ADMIN — CLOTRIMAZOLE 10 MG: 10 LOZENGE ORAL at 15:32

## 2019-01-01 RX ADMIN — SULFAMETHOXAZOLE AND TRIMETHOPRIM 160 MG: 800; 160 TABLET ORAL at 11:01

## 2019-01-01 RX ADMIN — SODIUM CHLORIDE, POTASSIUM CHLORIDE, SODIUM LACTATE AND CALCIUM CHLORIDE 500 ML: 600; 310; 30; 20 INJECTION, SOLUTION INTRAVENOUS at 19:00

## 2019-01-01 RX ADMIN — SULFAMETHOXAZOLE AND TRIMETHOPRIM 160 MG: 800; 160 TABLET ORAL at 08:33

## 2019-01-01 RX ADMIN — ACYCLOVIR 400 MG: 800 TABLET ORAL at 08:18

## 2019-01-01 RX ADMIN — PIPERACILLIN SODIUM,TAZOBACTAM SODIUM 3.38 G: 3; .375 INJECTION, POWDER, FOR SOLUTION INTRAVENOUS at 19:32

## 2019-01-01 RX ADMIN — DIPHENOXYLATE HYDROCHLORIDE AND ATROPINE SULFATE 2 TABLET: 2.5; .025 TABLET ORAL at 05:46

## 2019-01-01 RX ADMIN — DECITABINE 42 MG: 50 INJECTION, POWDER, LYOPHILIZED, FOR SOLUTION INTRAVENOUS at 14:46

## 2019-01-01 RX ADMIN — ATORVASTATIN CALCIUM 40 MG: 40 TABLET, FILM COATED ORAL at 08:34

## 2019-01-01 RX ADMIN — MELATONIN TAB 5 MG 5 MG: 5 TAB at 00:22

## 2019-01-01 RX ADMIN — THERA TABS 1 TABLET: TAB at 09:03

## 2019-01-01 RX ADMIN — LEVOTHYROXINE SODIUM 75 MCG: 75 TABLET ORAL at 05:22

## 2019-01-01 RX ADMIN — POTASSIUM CHLORIDE 40 MEQ: 1500 TABLET, EXTENDED RELEASE ORAL at 11:06

## 2019-01-01 RX ADMIN — ACYCLOVIR 400 MG: 800 TABLET ORAL at 21:42

## 2019-01-01 RX ADMIN — SULFAMETHOXAZOLE AND TRIMETHOPRIM 160 MG: 800; 160 TABLET ORAL at 10:00

## 2019-01-01 RX ADMIN — SODIUM CHLORIDE 1000 ML: 900 INJECTION, SOLUTION INTRAVENOUS at 16:26

## 2019-01-01 RX ADMIN — ATENOLOL 12.5 MG: 25 TABLET ORAL at 10:09

## 2019-01-01 RX ADMIN — SODIUM CHLORIDE 100 ML/HR: 900 INJECTION, SOLUTION INTRAVENOUS at 05:22

## 2019-01-01 RX ADMIN — ACYCLOVIR 400 MG: 800 TABLET ORAL at 02:03

## 2019-01-01 RX ADMIN — ATORVASTATIN CALCIUM 40 MG: 40 TABLET, FILM COATED ORAL at 08:47

## 2019-01-01 RX ADMIN — DIPHENOXYLATE HYDROCHLORIDE AND ATROPINE SULFATE 2 TABLET: 2.5; .025 TABLET ORAL at 16:31

## 2019-01-01 RX ADMIN — THERA TABS 1 TABLET: TAB at 08:19

## 2019-01-01 RX ADMIN — ACYCLOVIR 400 MG: 800 TABLET ORAL at 08:48

## 2019-01-01 RX ADMIN — ACYCLOVIR 400 MG: 800 TABLET ORAL at 08:33

## 2019-01-01 RX ADMIN — LEVOTHYROXINE SODIUM 75 MCG: 75 TABLET ORAL at 10:29

## 2019-01-01 RX ADMIN — CLOTRIMAZOLE 10 MG: 10 LOZENGE ORAL at 08:40

## 2019-01-01 RX ADMIN — SODIUM CHLORIDE, PRESERVATIVE FREE 10 ML: 5 INJECTION INTRAVENOUS at 20:12

## 2019-01-01 RX ADMIN — LEVOTHYROXINE SODIUM 75 MCG: 75 TABLET ORAL at 06:02

## 2019-01-01 RX ADMIN — CLOTRIMAZOLE 10 MG: 10 LOZENGE ORAL at 17:24

## 2019-01-01 RX ADMIN — LOPERAMIDE HYDROCHLORIDE 2 MG: 2 CAPSULE ORAL at 09:10

## 2019-01-01 RX ADMIN — ONDANSETRON 4 MG: 2 INJECTION INTRAMUSCULAR; INTRAVENOUS at 21:15

## 2019-01-01 RX ADMIN — DECITABINE 42 MG: 50 INJECTION, POWDER, LYOPHILIZED, FOR SOLUTION INTRAVENOUS at 16:16

## 2019-01-01 RX ADMIN — ATORVASTATIN CALCIUM 40 MG: 40 TABLET, FILM COATED ORAL at 10:08

## 2019-01-01 RX ADMIN — THERA TABS 1 TABLET: TAB at 08:47

## 2019-01-01 RX ADMIN — SODIUM CHLORIDE 100 ML/HR: 900 INJECTION, SOLUTION INTRAVENOUS at 21:36

## 2019-01-01 RX ADMIN — POTASSIUM CHLORIDE 40 MEQ: 1500 TABLET, EXTENDED RELEASE ORAL at 08:40

## 2019-01-01 RX ADMIN — CLOTRIMAZOLE 10 MG: 10 LOZENGE ORAL at 22:07

## 2019-01-01 RX ADMIN — SODIUM CHLORIDE, PRESERVATIVE FREE 10 ML: 5 INJECTION INTRAVENOUS at 09:04

## 2019-01-01 RX ADMIN — POTASSIUM CHLORIDE 40 MEQ: 1500 TABLET, EXTENDED RELEASE ORAL at 08:33

## 2019-01-01 RX ADMIN — ONDANSETRON 4 MG: 2 INJECTION INTRAMUSCULAR; INTRAVENOUS at 21:48

## 2019-01-01 RX ADMIN — THERA TABS 1 TABLET: TAB at 08:33

## 2019-01-01 RX ADMIN — SODIUM CHLORIDE, PRESERVATIVE FREE 10 ML: 5 INJECTION INTRAVENOUS at 08:43

## 2019-01-01 RX ADMIN — LEVOFLOXACIN 500 MG: 500 TABLET, FILM COATED ORAL at 10:50

## 2019-01-01 RX ADMIN — SODIUM CHLORIDE, PRESERVATIVE FREE 10 ML: 5 INJECTION INTRAVENOUS at 20:28

## 2019-01-01 RX ADMIN — ATENOLOL 12.5 MG: 25 TABLET ORAL at 21:48

## 2019-01-01 RX ADMIN — LEVOTHYROXINE SODIUM 75 MCG: 75 TABLET ORAL at 05:09

## 2019-01-01 RX ADMIN — SODIUM CHLORIDE 100 ML/HR: 900 INJECTION, SOLUTION INTRAVENOUS at 15:27

## 2019-01-01 RX ADMIN — ACYCLOVIR 400 MG: 800 TABLET ORAL at 20:28

## 2019-01-01 RX ADMIN — SODIUM CHLORIDE 250 ML: 900 INJECTION, SOLUTION INTRAVENOUS at 13:09

## 2019-01-01 RX ADMIN — SODIUM CHLORIDE, PRESERVATIVE FREE 10 ML: 5 INJECTION INTRAVENOUS at 08:31

## 2019-01-01 RX ADMIN — ACETAMINOPHEN 650 MG: 160 SUSPENSION ORAL at 00:24

## 2019-01-01 RX ADMIN — ACETAMINOPHEN 650 MG: 325 TABLET ORAL at 17:49

## 2019-01-01 RX ADMIN — DECITABINE 42 MG: 50 INJECTION, POWDER, LYOPHILIZED, FOR SOLUTION INTRAVENOUS at 13:59

## 2019-01-01 RX ADMIN — FLUCONAZOLE 200 MG: 100 TABLET ORAL at 10:07

## 2019-01-01 RX ADMIN — SODIUM CHLORIDE 1000 ML: 900 INJECTION, SOLUTION INTRAVENOUS at 14:38

## 2019-01-01 RX ADMIN — DECITABINE 42 MG: 50 INJECTION, POWDER, LYOPHILIZED, FOR SOLUTION INTRAVENOUS at 13:35

## 2019-01-01 RX ADMIN — FLUCONAZOLE 200 MG: 100 TABLET ORAL at 08:47

## 2019-01-01 RX ADMIN — LEVOFLOXACIN 500 MG: 500 TABLET, FILM COATED ORAL at 11:01

## 2019-01-01 RX ADMIN — Medication 10 ML: at 10:30

## 2019-01-01 RX ADMIN — CLOTRIMAZOLE 10 MG: 10 LOZENGE ORAL at 08:18

## 2019-01-01 RX ADMIN — CYANOCOBALAMIN TAB 1000 MCG 1000 MCG: 1000 TAB at 08:43

## 2019-01-01 RX ADMIN — SODIUM CHLORIDE, SODIUM LACTATE, POTASSIUM CHLORIDE, AND CALCIUM CHLORIDE 125 ML/HR: 600; 310; 30; 20 INJECTION, SOLUTION INTRAVENOUS at 06:21

## 2019-01-01 RX ADMIN — ATENOLOL 12.5 MG: 25 TABLET ORAL at 08:19

## 2019-01-01 RX ADMIN — LEVOTHYROXINE SODIUM 75 MCG: 75 TABLET ORAL at 05:51

## 2019-01-01 RX ADMIN — LEVOTHYROXINE SODIUM 75 MCG: 75 TABLET ORAL at 05:55

## 2019-01-01 RX ADMIN — ATENOLOL 12.5 MG: 25 TABLET ORAL at 10:30

## 2019-01-01 RX ADMIN — ACETAMINOPHEN 650 MG: 325 TABLET ORAL at 18:20

## 2019-01-01 RX ADMIN — DECITABINE 20 MG: 50 INJECTION, POWDER, LYOPHILIZED, FOR SOLUTION INTRAVENOUS at 12:52

## 2019-01-01 RX ADMIN — THERA TABS 1 TABLET: TAB at 10:08

## 2019-01-01 RX ADMIN — PIPERACILLIN SODIUM,TAZOBACTAM SODIUM 3.38 G: 3; .375 INJECTION, POWDER, FOR SOLUTION INTRAVENOUS at 02:04

## 2019-01-01 RX ADMIN — DIPHENHYDRAMINE HCL 25 MG: 25 TABLET ORAL at 13:38

## 2019-01-01 RX ADMIN — CLOTRIMAZOLE 10 MG: 10 LOZENGE ORAL at 21:41

## 2019-01-01 RX ADMIN — POTASSIUM CHLORIDE 40 MEQ: 1500 TABLET, EXTENDED RELEASE ORAL at 05:51

## 2019-01-01 RX ADMIN — ONDANSETRON 4 MG: 2 INJECTION INTRAMUSCULAR; INTRAVENOUS at 06:46

## 2019-01-01 RX ADMIN — CLOTRIMAZOLE 10 MG: 10 LOZENGE ORAL at 11:01

## 2019-01-01 RX ADMIN — FLUCONAZOLE 200 MG: 100 TABLET ORAL at 08:30

## 2019-01-01 RX ADMIN — LEVOTHYROXINE SODIUM 75 MCG: 75 TABLET ORAL at 06:21

## 2019-01-01 RX ADMIN — ATORVASTATIN CALCIUM 40 MG: 40 TABLET, FILM COATED ORAL at 08:18

## 2019-01-01 RX ADMIN — ATORVASTATIN CALCIUM 40 MG: 40 TABLET, FILM COATED ORAL at 09:03

## 2019-01-01 RX ADMIN — LEVOTHYROXINE SODIUM 75 MCG: 75 TABLET ORAL at 05:25

## 2019-01-01 RX ADMIN — CLOTRIMAZOLE 10 MG: 10 LOZENGE ORAL at 08:30

## 2019-01-01 RX ADMIN — SULFAMETHOXAZOLE AND TRIMETHOPRIM 160 MG: 800; 160 TABLET ORAL at 09:03

## 2019-01-01 RX ADMIN — ONDANSETRON 4 MG: 4 TABLET, FILM COATED ORAL at 08:33

## 2019-01-01 RX ADMIN — ACETAMINOPHEN 650 MG: 325 TABLET ORAL at 13:38

## 2019-01-01 RX ADMIN — THERA TABS 1 TABLET: TAB at 08:43

## 2019-01-01 RX ADMIN — CLOTRIMAZOLE 10 MG: 10 LOZENGE ORAL at 10:07

## 2019-01-01 RX ADMIN — SODIUM CHLORIDE, PRESERVATIVE FREE 10 ML: 5 INJECTION INTRAVENOUS at 10:09

## 2019-01-01 RX ADMIN — ACETAMINOPHEN 500 MG: 500 TABLET, FILM COATED ORAL at 00:25

## 2019-01-01 RX ADMIN — CYANOCOBALAMIN TAB 1000 MCG 1000 MCG: 1000 TAB at 10:08

## 2019-01-01 RX ADMIN — THERA TABS 1 TABLET: TAB at 08:30

## 2019-01-01 RX ADMIN — SODIUM CHLORIDE, PRESERVATIVE FREE 10 ML: 5 INJECTION INTRAVENOUS at 21:09

## 2019-01-01 RX ADMIN — PIPERACILLIN SODIUM,TAZOBACTAM SODIUM 3.38 G: 3; .375 INJECTION, POWDER, FOR SOLUTION INTRAVENOUS at 17:30

## 2019-01-01 RX ADMIN — ACETAMINOPHEN 500 MG: 500 TABLET, FILM COATED ORAL at 21:41

## 2019-01-01 RX ADMIN — CLOTRIMAZOLE 10 MG: 10 LOZENGE ORAL at 15:27

## 2019-01-01 RX ADMIN — SODIUM CHLORIDE, PRESERVATIVE FREE 10 ML: 5 INJECTION INTRAVENOUS at 20:14

## 2019-01-01 RX ADMIN — ACYCLOVIR 400 MG: 800 TABLET ORAL at 20:13

## 2019-01-01 RX ADMIN — SODIUM CHLORIDE, PRESERVATIVE FREE 10 ML: 5 INJECTION INTRAVENOUS at 22:04

## 2019-01-01 RX ADMIN — CLOTRIMAZOLE 10 MG: 10 LOZENGE ORAL at 08:33

## 2019-01-01 RX ADMIN — DECITABINE 42 MG: 50 INJECTION, POWDER, LYOPHILIZED, FOR SOLUTION INTRAVENOUS at 14:37

## 2019-01-01 RX ADMIN — ACYCLOVIR 400 MG: 800 TABLET ORAL at 21:10

## 2019-01-01 RX ADMIN — ACYCLOVIR 400 MG: 200 CAPSULE ORAL at 22:07

## 2019-01-01 RX ADMIN — SODIUM CHLORIDE, PRESERVATIVE FREE 10 ML: 5 INJECTION INTRAVENOUS at 08:41

## 2019-01-01 RX ADMIN — DECITABINE 42 MG: 50 INJECTION, POWDER, LYOPHILIZED, FOR SOLUTION INTRAVENOUS at 14:32

## 2019-01-01 RX ADMIN — Medication 10 ML: at 23:24

## 2019-01-01 RX ADMIN — FLUCONAZOLE 200 MG: 100 TABLET ORAL at 11:17

## 2019-01-01 RX ADMIN — LEVOTHYROXINE SODIUM 75 MCG: 75 TABLET ORAL at 05:30

## 2019-01-01 RX ADMIN — CLOTRIMAZOLE 10 MG: 10 LOZENGE ORAL at 16:38

## 2019-01-01 RX ADMIN — CYANOCOBALAMIN TAB 1000 MCG 1000 MCG: 1000 TAB at 08:18

## 2019-01-01 RX ADMIN — ACETAMINOPHEN 650 MG: 325 TABLET ORAL at 09:04

## 2019-01-01 RX ADMIN — PIPERACILLIN SODIUM,TAZOBACTAM SODIUM 3.38 G: 3; .375 INJECTION, POWDER, FOR SOLUTION INTRAVENOUS at 12:42

## 2019-01-01 RX ADMIN — SULFAMETHOXAZOLE AND TRIMETHOPRIM 160 MG: 800; 160 TABLET ORAL at 08:18

## 2019-01-01 RX ADMIN — FLUCONAZOLE 200 MG: 100 TABLET ORAL at 08:40

## 2019-01-01 RX ADMIN — ACYCLOVIR 400 MG: 800 TABLET ORAL at 08:30

## 2019-01-01 RX ADMIN — SULFAMETHOXAZOLE AND TRIMETHOPRIM 160 MG: 800; 160 TABLET ORAL at 11:06

## 2019-01-01 RX ADMIN — ACYCLOVIR 400 MG: 800 TABLET ORAL at 11:06

## 2019-01-01 RX ADMIN — LEVOTHYROXINE SODIUM 75 MCG: 75 TABLET ORAL at 05:46

## 2019-01-01 RX ADMIN — PIPERACILLIN SODIUM,TAZOBACTAM SODIUM 3.38 G: 3; .375 INJECTION, POWDER, FOR SOLUTION INTRAVENOUS at 06:20

## 2019-01-01 RX ADMIN — ACYCLOVIR 400 MG: 200 CAPSULE ORAL at 15:27

## 2019-01-01 RX ADMIN — SODIUM CHLORIDE, SODIUM LACTATE, POTASSIUM CHLORIDE, AND CALCIUM CHLORIDE 125 ML/HR: 600; 310; 30; 20 INJECTION, SOLUTION INTRAVENOUS at 10:30

## 2019-01-01 RX ADMIN — ZINC OXIDE: 200 OINTMENT TOPICAL at 08:43

## 2019-01-01 RX ADMIN — SULFAMETHOXAZOLE AND TRIMETHOPRIM 160 MG: 800; 160 TABLET ORAL at 11:18

## 2019-01-01 RX ADMIN — DECITABINE 42 MG: 50 INJECTION, POWDER, LYOPHILIZED, FOR SOLUTION INTRAVENOUS at 14:09

## 2019-01-01 RX ADMIN — CLOTRIMAZOLE 10 MG: 10 LOZENGE ORAL at 09:03

## 2019-01-01 RX ADMIN — CLOTRIMAZOLE 10 MG: 10 LOZENGE ORAL at 17:30

## 2019-01-01 RX ADMIN — ACYCLOVIR 400 MG: 800 TABLET ORAL at 10:08

## 2019-01-01 RX ADMIN — ATENOLOL 12.5 MG: 25 TABLET ORAL at 08:43

## 2019-01-01 RX ADMIN — POTASSIUM CHLORIDE 40 MEQ: 1500 TABLET, EXTENDED RELEASE ORAL at 16:38

## 2019-01-01 RX ADMIN — LEVOTHYROXINE SODIUM 75 MCG: 75 TABLET ORAL at 05:19

## 2019-01-01 RX ADMIN — ONDANSETRON 8 MG: 2 INJECTION INTRAMUSCULAR; INTRAVENOUS at 14:11

## 2019-01-01 RX ADMIN — SULFAMETHOXAZOLE AND TRIMETHOPRIM 160 MG: 800; 160 TABLET ORAL at 10:29

## 2019-01-01 RX ADMIN — SODIUM CHLORIDE, PRESERVATIVE FREE 10 ML: 5 INJECTION INTRAVENOUS at 21:50

## 2019-01-01 RX ADMIN — DIPHENHYDRAMINE HCL 25 MG: 25 TABLET ORAL at 18:20

## 2019-01-01 RX ADMIN — CLOTRIMAZOLE 10 MG: 10 LOZENGE ORAL at 10:29

## 2019-01-01 RX ADMIN — CLOTRIMAZOLE 10 MG: 10 LOZENGE ORAL at 17:49

## 2019-01-01 RX ADMIN — LEVOFLOXACIN 500 MG: 500 TABLET, FILM COATED ORAL at 10:30

## 2019-01-01 RX ADMIN — CLOTRIMAZOLE 10 MG: 10 LOZENGE ORAL at 17:23

## 2019-01-01 RX ADMIN — SODIUM CHLORIDE 500 ML: 900 INJECTION, SOLUTION INTRAVENOUS at 15:05

## 2019-01-01 RX ADMIN — SODIUM CHLORIDE, SODIUM LACTATE, POTASSIUM CHLORIDE, AND CALCIUM CHLORIDE 125 ML/HR: 600; 310; 30; 20 INJECTION, SOLUTION INTRAVENOUS at 11:00

## 2019-01-01 RX ADMIN — DIPHENHYDRAMINE HCL 25 MG: 25 TABLET ORAL at 09:04

## 2019-01-01 RX ADMIN — CLOTRIMAZOLE 10 MG: 10 LOZENGE ORAL at 15:12

## 2019-01-01 RX ADMIN — CLOTRIMAZOLE 10 MG: 10 LOZENGE ORAL at 08:48

## 2019-01-01 RX ADMIN — CYANOCOBALAMIN TAB 1000 MCG 1000 MCG: 1000 TAB at 09:03

## 2019-01-01 RX ADMIN — DECITABINE 42 MG: 50 INJECTION, POWDER, LYOPHILIZED, FOR SOLUTION INTRAVENOUS at 16:02

## 2019-01-01 RX ADMIN — CLOTRIMAZOLE 10 MG: 10 LOZENGE ORAL at 02:03

## 2019-01-01 RX ADMIN — DECITABINE 42 MG: 50 INJECTION, POWDER, LYOPHILIZED, FOR SOLUTION INTRAVENOUS at 12:29

## 2019-01-01 RX ADMIN — CLOTRIMAZOLE 10 MG: 10 LOZENGE ORAL at 21:50

## 2019-01-01 RX ADMIN — LEVOTHYROXINE SODIUM 75 MCG: 75 TABLET ORAL at 06:16

## 2019-01-01 RX ADMIN — CLOTRIMAZOLE 10 MG: 10 LOZENGE ORAL at 16:24

## 2019-01-01 RX ADMIN — CYANOCOBALAMIN TAB 1000 MCG 1000 MCG: 1000 TAB at 08:30

## 2019-01-01 RX ADMIN — LIDOCAINE HYDROCHLORIDE: 10 INJECTION, SOLUTION INFILTRATION; PERINEURAL at 16:41

## 2019-01-01 RX ADMIN — SODIUM CHLORIDE, PRESERVATIVE FREE 10 ML: 5 INJECTION INTRAVENOUS at 08:19

## 2019-01-01 RX ADMIN — SODIUM CHLORIDE, PRESERVATIVE FREE 10 ML: 5 INJECTION INTRAVENOUS at 08:30

## 2019-01-01 RX ADMIN — SODIUM CHLORIDE, PRESERVATIVE FREE 10 ML: 5 INJECTION INTRAVENOUS at 21:42

## 2019-01-01 RX ADMIN — SULFAMETHOXAZOLE AND TRIMETHOPRIM 160 MG: 800; 160 TABLET ORAL at 10:49

## 2019-01-01 RX ADMIN — ACYCLOVIR 400 MG: 800 TABLET ORAL at 21:49

## 2019-01-01 RX ADMIN — SULFAMETHOXAZOLE AND TRIMETHOPRIM 160 MG: 800; 160 TABLET ORAL at 12:17

## 2019-01-01 RX ADMIN — ONDANSETRON 4 MG: 2 INJECTION INTRAMUSCULAR; INTRAVENOUS at 05:46

## 2019-01-01 RX ADMIN — ACYCLOVIR 400 MG: 800 TABLET ORAL at 08:40

## 2019-01-01 RX ADMIN — ACYCLOVIR 400 MG: 200 CAPSULE ORAL at 11:00

## 2019-01-01 RX ADMIN — ACYCLOVIR 400 MG: 800 TABLET ORAL at 15:11

## 2019-01-01 RX ADMIN — PIPERACILLIN SODIUM,TAZOBACTAM SODIUM 3.38 G: 3; .375 INJECTION, POWDER, FOR SOLUTION INTRAVENOUS at 08:10

## 2019-01-01 RX ADMIN — THERA TABS 1 TABLET: TAB at 08:40

## 2019-01-01 RX ADMIN — FLUCONAZOLE 200 MG: 100 TABLET ORAL at 10:29

## 2019-01-01 RX ADMIN — PIPERACILLIN SODIUM,TAZOBACTAM SODIUM 3.38 G: 3; .375 INJECTION, POWDER, FOR SOLUTION INTRAVENOUS at 15:13

## 2019-01-01 RX ADMIN — CLOTRIMAZOLE 10 MG: 10 LOZENGE ORAL at 16:31

## 2019-01-01 RX ADMIN — CLOTRIMAZOLE 10 MG: 10 LOZENGE ORAL at 22:04

## 2019-01-01 RX ADMIN — CLOTRIMAZOLE 10 MG: 10 LOZENGE ORAL at 11:17

## 2019-01-01 RX ADMIN — CLOTRIMAZOLE 10 MG: 10 LOZENGE ORAL at 20:11

## 2019-01-01 RX ADMIN — ACYCLOVIR 400 MG: 800 TABLET ORAL at 21:32

## 2019-01-01 RX ADMIN — CYANOCOBALAMIN TAB 1000 MCG 1000 MCG: 1000 TAB at 08:34

## 2019-01-01 RX ADMIN — FLUCONAZOLE 200 MG: 100 TABLET ORAL at 08:19

## 2019-01-01 RX ADMIN — CLOTRIMAZOLE 10 MG: 10 LOZENGE ORAL at 08:43

## 2019-01-01 RX ADMIN — DIPHENOXYLATE HYDROCHLORIDE AND ATROPINE SULFATE 2 TABLET: 2.5; .025 TABLET ORAL at 05:51

## 2019-01-01 RX ADMIN — FLUCONAZOLE 200 MG: 100 TABLET ORAL at 09:03

## 2019-01-01 RX ADMIN — CYANOCOBALAMIN TAB 1000 MCG 1000 MCG: 1000 TAB at 08:40

## 2019-01-01 RX ADMIN — ATORVASTATIN CALCIUM 40 MG: 40 TABLET, FILM COATED ORAL at 22:07

## 2019-08-09 PROBLEM — E03.9 HYPOTHYROIDISM: Status: ACTIVE | Noted: 2019-01-01

## 2019-08-09 PROBLEM — I10 HYPERTENSION: Status: ACTIVE | Noted: 2019-01-01

## 2019-08-09 NOTE — PROGRESS NOTES
HEMATOLOGY ONCOLOGY OUTPATIENT CONSULTATION       Patient name: Rock Rock  : 1940  MRN: [id number]  Primary Care Physician: Jovana Nur APRN  Referring Physician: No ref. provider found  Reason For Consult:     No chief complaint on file.          HPI:   History of Present Illness:          Subjective:  19        The following portions of the patient's history were reviewed and updated as appropriate: allergies, current medications, past family history, past medical history, past social history, past surgical history and problem list.         No past medical history on file.    No past surgical history on file.    No current outpatient medications on file.    Allergies not on file    No family history on file.    Cancer-related family history is not on file.    Social History     Tobacco Use   • Smoking status: Not on file   Substance Use Topics   • Alcohol use: Not on file   • Drug use: Not on file         ROS:     Review of Systems    Objective:    There were no vitals filed for this visit.  ECOG  {[facility] ECOG Status:83402}    Physical Exam:     Physical Exam          Lab Results - Last 18 Months   Lab Units 19  1333   WBC 10*3/uL 4.3*   HEMOGLOBIN g/dL 12.9*   HEMATOCRIT % 38.2*   PLATELETS 10*3/uL 201   MCV fL 90.8     Lab Results - Last 18 Months   Lab Units 19  1333   SODIUM mmol/L 137   POTASSIUM mmol/L 4.6   CHLORIDE mmol/L 102   TOTAL CO2 mmol/L 26   BUN mg/dL 18   CREATININE mg/dl 1.7*   CALCIUM mg/dL 9.3   GLUCOSE mg/dL 96       Lab Results   Component Value Date    GLUCOSE 96 2019    BUN 18 2019    CREATININE 1.7 (H) 2019    BCR 10.6 2019    K 4.6 2019    CO2 26 2019    CALCIUM 9.3 2019       No results for input(s): APTT, INR, PTT in the last 02780 hours.    No results found for: IRON, TIBC, FERRITIN    No results found for: FOLATE    No results found for: OCCULTBLD    No results found for:  RETICCTPCT    No results found for: [id number]  No results found for: SPEP, UPEP  No results found for: LDH, URICACID  No results found for: ALFRED, RF, SEDRATE  No results found for: FIBRINOGEN, HAPTOGLOBIN  No results found for: PTT, INR  No results found for:   No results found for: CEA  No components found for: CA-19-9          Assessment/Plan     Assessment:  1.           Plan:  1.           There are no diagnoses linked to this encounter.  No orders of the defined types were placed in this encounter.                    Thank you very much for providing the opportunity to participate in this patient’s care. Please do not hesitate to call if there are any other questions      I have reviewed all relevant labs results, imaging, vitals, medications and plan with the patient today.    Portions of the note have been Scribed by medical assistant/Nurse.  I have reviewed and made changes accordingly.

## 2019-08-12 PROBLEM — D64.9 ANEMIA: Status: ACTIVE | Noted: 2019-01-01

## 2019-08-12 PROBLEM — D72.819 LEUKOPENIA: Status: ACTIVE | Noted: 2019-01-01

## 2019-08-12 NOTE — PROGRESS NOTES
"\"Bone Marrow Biopsy With Aspiration  Date/Time: 2019 4:50 PM  Performed by: Sergio Stevens MD  Authorized by: Sergio Stevens MD   Preparation: Patient was prepped and draped in the usual sterile fashion.  Local anesthesia used: yes    Anesthesia:  Local anesthesia used: yes  Local Anesthetic: lidocaine 2% with epinephrine    Sedation:  Patient sedated: no    Patient tolerance: Patient tolerated the procedure well with no immediate complications  Comments:     ============    BONE MARROW PROCEDURE      =========      LOCATION:  Office    INDICATIONS:  Leukopenia and anemia    PROCEDURE:  After informed consent was obtained, the skin overlying the left buttock was prepped and draped in sterile fashion.  A \"time-out\" was called at 420 p.m.    Patient identity was confirmed by:  I confirmed name and  on ID band  Correct site confirmed as:  Left posterior iliac crest  Procedure confirmed as:  Bone Marrow Biopsy with Aspiration    At  420 p.m., pt was placed in a Left lateral decubitus position and area was cleaned and draped in a sterile fashion and 2% plain Xylocaine was infiltrated into the skin and subcutaneous tissue.  Using a 15 gauge aspirate needle, the marrow cavity was entered and marrow withdrawn without complication. Then using an 11 gauge 4 inch needle, bone marrow biopsy was performed. Pressure was applied on the area to ensure hemostasis and followed by a sterile dressing. Patient was asked to lay on his back for half hour post procedure.  The patient tolerated the procedure well.       Sergio Stevens MD   2019             "

## 2019-08-12 NOTE — TELEPHONE ENCOUNTER
S/W Patient and he confirmed appointment information and confirmed that he had received all of the new patient paperwork and has it all filled out

## 2019-08-12 NOTE — PROGRESS NOTES
HEMATOLOGY ONCOLOGY OUTPATIENT CONSULTATION       Patient name: Rock Rock  : 1940  MRN: [id number]  Primary Care Physician: Jovana Nur APRN  Referring Physician: Jovana Nur APRN  Reason For Consult:     Chief Complaint   Patient presents with   • Consult     Leukocytopenia and anemia           HPI:   History of Present Illness:  79-year-old male presents to the office today for consultation in regards to his abnormal blood cell counts.  He was referred by his primary care provider, Jovana Nur NP, after lab work revealed a white blood cell count of 1.0 on 2019.  Review of CBC from 3/14/2019 shows a white blood cell count of 4.3.  Previously, he had a normal white count of 8.0 and a hemoglobin of 13.2 on 7/3/2018.  Patient has a history of multiple cancers in the past.        · 3/14/2019 --WBC 4.3, hemoglobin 12.9, MCV 90.8, platelets 201,000.  Creatinine 1.7  · 2019 --WBC 1.0, hemoglobin 9.4, .7, platelets 150,000, ANC 0.3.  Absolute blasts 0.13.  Ferritin 518.2, iron 53, , iron sat 25, no folate or B12 deficiency.  BUN 20, creatinine 1.6    Patient was seen briefly in  by Dr. Pulido for diagnosis of biclonal gammopathy but was lost to follow-up.  Patient had a past history of renal cell carcinoma with left nephrectomy in .  He was followed by Dr. Hummel and was found to have elevated creatinine.  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  Skeletal survey showed degenerative changes only.  Patient also has history of right breast cancer diagnosed in  he had the tumor removed at [facility] and subsequent axillary lymph node dissection.  He has also had multiple melanoma skin cancers removed beginning in .  He has had a recent removal of both basal cell and squamous cell carcinomas from the skin.    Lives in [place] with his wife.  He is retired from  auto parts sales.  Has 5 adult children.  Previous history of cigarette use quit in 1986.  No alcohol no illicit drug use.  Sister with Breast cancer in her 40's no other family history of hematologic disorder or malignancy.     Subjective:  08/12/19 Patient here today for his initial visit.  Patient has had about a 12 lb weight loss and decrease in appetite.  Admits to feeling tired.  Denies any night sweats/fevers. Denies any pain.          The following portions of the patient's history were reviewed and updated as appropriate: allergies, current medications, past family history, past medical history, past social history, past surgical history and problem list.         Past Medical History:   Diagnosis Date   • Biclonal gammopathy     Diagnosed in 2011   • Breast cancer (CMS/HCC)     History of breast cancer in 1991 status post mastectomy and lymph node dissection   • Cancer of kidney (CMS/HCC)     Left kidney cancer status post nephrectomy 2006   • Chronic kidney disease (CKD), stage III (moderate) (CMS/HCC)    • Dyslipidemia    • History of skin cancer     History of multiple skin cancers in 1994   • Hypertension        Past Surgical History:   Procedure Laterality Date   • MASTECTOMY      Right mastectomy   • NEPHRECTOMY      Left nephrectomy         Current Outpatient Medications:   •  atenolol (TENORMIN) 25 MG tablet, ATENOLOL 25 MG TABS, Disp: , Rfl:   •  atorvastatin (LIPITOR) 40 MG tablet, Take 40 mg by mouth Daily., Disp: , Rfl:   •  hydrALAZINE (APRESOLINE) 50 MG tablet, HYDRALAZINE HCL 50 MG TABS, Disp: , Rfl:   •  levothyroxine (SYNTHROID, LEVOTHROID) 75 MCG tablet, LEVOTHYROXINE SODIUM 75 MCG TABS, Disp: , Rfl:   •  Multiple Vitamin (MULTIVITAMIN) capsule, MULTIVITAMINS CAPS, Disp: , Rfl:   •  vitamin D (ERGOCALCIFEROL) 41419 units capsule capsule, TAKE ONE CAPSULE BY MOUTH ONCE EVERY WEEK FOR 84 DAYS, Disp: , Rfl: 1    No Known Allergies    Family History   Problem Relation Age of Onset   • Cancer  "Sister        Cancer-related family history includes Cancer in his sister.    Social History     Tobacco Use   • Smoking status: Former Smoker   • Smokeless tobacco: Never Used   Substance Use Topics   • Alcohol use: No     Frequency: Never   • Drug use: No         ROS:     Review of Systems   Constitutional: Positive for appetite change, fatigue and unexpected weight change. Negative for chills and fever.   HENT: Negative for ear pain, mouth sores, nosebleeds and sore throat.    Eyes: Negative for photophobia and visual disturbance.   Respiratory: Negative for wheezing and stridor.    Cardiovascular: Negative for chest pain and palpitations.   Gastrointestinal: Negative for abdominal pain, diarrhea, nausea and vomiting.   Endocrine: Negative for cold intolerance and heat intolerance.   Genitourinary: Negative for dysuria and hematuria.   Musculoskeletal: Negative for joint swelling and neck stiffness.   Skin: Negative for color change and rash.   Neurological: Negative for seizures and syncope.   Hematological: Negative for adenopathy.        No obvious bleeding   Psychiatric/Behavioral: Negative for agitation, confusion and hallucinations.   All other systems reviewed and are negative.      Objective:    Vitals:    08/12/19 1451   BP: 111/69   Pulse: 69   Resp: 16   Temp: 98.1 °F (36.7 °C)   Weight: 90.4 kg (199 lb 6.4 oz)   Height: 188 cm (74\")   PainSc: 0-No pain     ECOG  (1) Restricted in physically strenuous activity, ambulatory and able to do work of light nature    Physical Exam:     Physical Exam   Constitutional: He is oriented to person, place, and time. He appears well-developed and well-nourished. No distress.   HENT:   Head: Normocephalic and atraumatic.   Eyes: Conjunctivae and EOM are normal. Right eye exhibits no discharge. Left eye exhibits no discharge. No scleral icterus.   Neck: Normal range of motion. Neck supple. No thyromegaly present.   Cardiovascular: Normal rate, regular rhythm and normal " heart sounds. Exam reveals no gallop and no friction rub.   Pulmonary/Chest: Effort normal. No stridor. No respiratory distress. He has no wheezes.   Abdominal: Soft. Bowel sounds are normal. He exhibits no mass. There is no tenderness. There is no rebound and no guarding.   Musculoskeletal: Normal range of motion. He exhibits no tenderness.   Lymphadenopathy:     He has no cervical adenopathy.   Neurological: He is alert and oriented to person, place, and time. He exhibits normal muscle tone.   Skin: Skin is warm. No rash noted. He is not diaphoretic. No erythema.   Left leg healing ulcer from skin cancer removal   Psychiatric: He has a normal mood and affect. His behavior is normal.   Nursing note and vitals reviewed.            Lab Results - Last 18 Months   Lab Units 08/12/19  1719 03/14/19  1333   WBC 10*3/mm3 1.61* 4.3*   HEMOGLOBIN g/dL 9.1* 12.9*   HEMATOCRIT % 27.7* 38.2*   PLATELETS 10*3/mm3 139* 201   MCV fL 100.4* 90.8     Lab Results - Last 18 Months   Lab Units 03/14/19  1333   SODIUM mmol/L 137   POTASSIUM mmol/L 4.6   CHLORIDE mmol/L 102   TOTAL CO2 mmol/L 26   BUN mg/dL 18   CREATININE mg/dl 1.7*   CALCIUM mg/dL 9.3   GLUCOSE mg/dL 96       Lab Results   Component Value Date    GLUCOSE 96 03/14/2019    BUN 18 03/14/2019    CREATININE 1.7 (H) 03/14/2019    BCR 10.6 03/14/2019    K 4.6 03/14/2019    CO2 26 03/14/2019    CALCIUM 9.3 03/14/2019       No results for input(s): APTT, INR, PTT in the last 97159 hours.    No results found for: IRON, TIBC, FERRITIN    No results found for: FOLATE    No results found for: OCCULTBLD    No results found for: RETICCTPCT    No results found for: [id number]  No results found for: SPEP, UPEP  No results found for: LDH, URICACID  No results found for: ALFRED, RF, SEDRATE  No results found for: FIBRINOGEN, HAPTOGLOBIN  No results found for: PTT, INR  No results found for:   No results found for: CEA  No components found for: CA-19-9          Assessment/Plan      Assessment:  1. Leukopenia:-Acute and severe.  I am concerned he has a hematological neoplasm such as myelodysplastic syndrome, leukemia or myeloma.   2. Macrocytic Anemia: He had a normal hemoglobin of 13.2 a year ago.  Hemoglobin is now down to 9.4.  Suspect hematological neoplasm.  3. History of kidney cancer/right breast cancer/melanoma  4. History of biclonal gammopathy: It is possible this has advanced to myeloma.  We will do further work-up  5. Hx. Of multiple basal and squamous cell carcinomas.  6. Chronic kidney disease stage III: His creatinine has ranged around 1.6.          Plan:  1. Bone Marrow aspiration and biopsy today  2. Work-up to evaluate for hematological malignancy-multiple myeloma vs. MDS.  SPEP, SIFE/UIFE, FLC, Immunoglobulins, Hapto, B-12, Retic, ESR   3. Will need genetic Testing due to patient history of multiple malignancy and sister with breast cancer at early age.  Pretest counseling provided.          Leukopenia, unspecified type  - CBC Auto Differential  - Haptoglobin  - Vitamin B12  - Protein Elec + Interp, Serum  - Immunoglobulin Free LT Chains Blood  - Immunofixation, Serum  - Bone Marrow Biopsy With Aspiration  - [facility] Requisition ([place] Only)    Anemia, unspecified type  - CBC Auto Differential  - Haptoglobin  - Vitamin B12  - Protein Elec + Interp, Serum  - Immunoglobulin Free LT Chains Blood  - Immunofixation, Serum  - Reticulocytes  - Sedimentation Rate  - Bone Marrow Biopsy With Aspiration  - [facility] Requisition ([place] Only)    Personal history of malignant neoplasm of breast  - Genetic Testing - Blood,    Orders Placed This Encounter   Procedures   • CBC Auto Differential     collect LABS TODAY     Scheduling Instructions:      collect LABS TODAY   • Haptoglobin     Labs today     Scheduling Instructions:      Labs today   • Vitamin B12     Labs today     Scheduling Instructions:      Labs today   • Protein Elec + Interp, Serum     collect LABS TODAY     Scheduling  Instructions:      collect LABS TODAY   • Immunoglobulin Free LT Chains Blood     Labs today     Scheduling Instructions:      Labs today   • Immunofixation, Serum     collect LABS TODAY     Scheduling Instructions:      collect LABS TODAY   • Reticulocytes   • Sedimentation Rate   • [facility] Requisition ([place] Only)     Left posterior iliac crest; bone marrow specimen and aspirate    GP 5500-4, GP 5250-6     Standing Status:   Future     Standing Expiration Date:   8/12/2020   • Genetic Testing - Blood,     [employer] or [employer]   Patient has personal history of multiple different cancers     Standing Status:   Future     Standing Expiration Date:   8/12/2020   • Bone Marrow Biopsy With Aspiration     This order was created via procedure documentation            Pretest counseling for genetic testing:-    Today's risk assessment is based on the history the patient has provided. We reviewed all the hallmarks of hereditary cancer syndrome including multiple different cancers in one individual, which this patient has including breast cancer, renal cancer, melanoma, and no possibly hematological neoplasm.  His sister has breast cancer as well when she was around her 40s..      We discussed the implications of a positive deleterious mutation.  If a deleterious mutation is identified, it is recommended to have a single site testing for close family members.  Identification of mutations in any individual may result in recommendations for prophylactic surgeries, chemoprevention, lifestyle modifications and aggressive screening with mammogram and MRI, and also increased breast awareness and breast self exams.  Patient understands if he screens positive, then at-risk family members will need to be screened as well.  Each offspring has a 50% chance of inheriting a deleterious mutation if positive in a parent.      A negative deleterious mutation will make hereditary cancer syndrome much less likely, but does not rule out  the possibility of a gene mutation that cannot be detected with the available technology.  She also understands that a negative gene test result might indicate that the cancers that occurred in her family were most likely sporadic, or even if there is a mutation the patient did not inherit it.     We discussed variant of unknown significance.  Patient understands that no medical decisions will be made based on a variant of unknown significance, but I did stress the importance of maintaining contact information with us should this be the case.      We discussed the financial implications of the above testing.  Patient understands when the right clinical criteria are met that most insurance companies will cover the cost.      We also discussed the various insurability issues with a deleterious mutation result.     Patient has give us consent to proceed with comprehensive genetic analysis.             Thank you very much for providing the opportunity to participate in this patient’s care. Please do not hesitate to call if there are any other questions      I have reviewed all relevant labs results, imaging, vitals, medications and plan with the patient today.    Portions of the note have been Scribed by medical assistant/Nurse.  I have reviewed and made changes accordingly.

## 2019-08-15 NOTE — TELEPHONE ENCOUNTER
I called and left a message for patient. Per Dr. Stevens patient needs to come in for an appointment asap.

## 2019-08-16 PROBLEM — C92.00 AML (ACUTE MYELOBLASTIC LEUKEMIA) (HCC): Status: ACTIVE | Noted: 2019-01-01

## 2019-08-16 NOTE — TELEPHONE ENCOUNTER
Mr. Rock left message he had received call to schedule an appt.  Per Mona BILL, should schedule 30 min appt.  Returned call to Mr. Rock.  He is not available W to Friday next week, but available Mon/Tues.  Appt scheduled for 1:30 Monday.  He will check for transportation.

## 2019-08-16 NOTE — TELEPHONE ENCOUNTER
Patient is diagnosed with acute myeloid leukemia.  We need to get him prior authorized for Dacogen.  Will need additional testing.  We will need to see him soon next week and discussed with him treatment.

## 2019-08-19 PROBLEM — R79.89 ELEVATED SERUM CREATININE: Status: ACTIVE | Noted: 2019-01-01

## 2019-08-19 PROBLEM — R53.1 WEAKNESS: Status: ACTIVE | Noted: 2019-01-01

## 2019-08-19 NOTE — PROGRESS NOTES
HEMATOLOGY ONCOLOGY OUTPATIENT FOLLOW UP      Patient name: Rock Rock  : 1940  MRN: [id number]  Primary Care Physician: Jovana Nur APRN  Referring Physician: Jovana Nur APRN  Reason For Consult:     Chief Complaint   Patient presents with   • Follow-up     Leukopenia:-Acute and severe   • Follow-up     Macrocytic Anemia           HPI:   History of Present Illness:  79-year-old male presents to the office today for consultation in regards to his abnormal blood cell counts.  He was referred by his primary care provider, Jovana Nur NP, after lab work revealed a white blood cell count of 1.0 on 2019.  Review of CBC from 3/14/2019 shows a white blood cell count of 4.3.  Previously, he had a normal white count of 8.0 and a hemoglobin of 13.2 on 7/3/2018.  Patient has a history of multiple cancers in the past.    · 3/14/2019 --WBC 4.3, hemoglobin 12.9, MCV 90.8, platelets 201,000.  Creatinine 1.7  · 2019 --WBC 1.0, hemoglobin 9.4, .7, platelets 150,000, ANC 0.3.  Absolute blasts 0.13.  Ferritin 518.2, iron 53, , iron sat 25, no folate or B12 deficiency.  BUN 20, creatinine 1.6    Patient was seen briefly in  by Dr. Pulido for diagnosis of biclonal gammopathy but was lost to follow-up.  Patient had a past history of renal cell carcinoma with left nephrectomy in .  He was followed by Dr. Hummel and was found to have elevated creatinine.  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  Skeletal survey showed degenerative changes only.  Patient also has history of right breast cancer diagnosed in  he had the tumor removed at [facility] and subsequent axillary lymph node dissection.  He has also had multiple melanoma skin cancers removed beginning in .  He has had a recent removal of both basal cell and squamous cell carcinomas from the skin.    Lives in [place] with  his wife.  He is retired from auto parts sales.  Has 5 adult children.  Previous history of cigarette use quit in 1986.  No alcohol no illicit drug use.  Sister with Breast cancer in her 40's no other family history of hematologic disorder or malignancy.     8/12/2019: SPEP shows M protein of 0.8 g/dL, immunofixation shows IgA kappa monoclonal gammopathy  Reticulocyte count is 2.35% ESR is 107, B12 is 805, haptoglobin 175, WBC 1.6, hemoglobin 9.1, platelets 139K,     8/12/2019: Left posterior iliac crest bone marrow aspirate and core biopsy: Smears are consistent with AML with the 65% blast percentage, blasts or CD11c positive, CD13 positive, CD33 positive, CD34 negative, CD38 positive, CD64 dim +, +, HLA-DR-  Peripheral blood flow cytometry showed 14% blasts.    Subjective:    08/19/19 Patient here today for his follow up, adult female present. Wheelchair at bedside.  Patient has had about a 17 lb weight loss in one week, reports no appetite and unable to keep anything down.  Admits to feeling tired.  Denies any night sweats/fevers. Denies any pain. Visitor states it may be difficult to get the here everyday for treatment due to other family needs, but [facility] do [facility]. She states he is getting weaker by the day. She asks questions regarding treatment and side effects. She states she is also worried the patient may be getting pneumonia, and he also has a little bit of confusion at times. The patient denies burning in the urine.   He has expansile decrease in performance status compared to the last week.      The following portions of the patient's history were reviewed and updated as appropriate: allergies, current medications, past family history, past medical history, past social history, past surgical history and problem list.         Past Medical History:   Diagnosis Date   • Biclonal gammopathy     Diagnosed in 2011   • Breast cancer (CMS/[facility])     History of breast cancer  in 1991 status post mastectomy and lymph node dissection   • Cancer of kidney (CMS/HCC)     Left kidney cancer status post nephrectomy 2006   • Chronic kidney disease (CKD), stage III (moderate) (CMS/HCC)    • Dyslipidemia    • History of skin cancer     History of multiple skin cancers in 1994   • Hypertension        Past Surgical History:   Procedure Laterality Date   • MASTECTOMY      Right mastectomy   • NEPHRECTOMY      Left nephrectomy         Current Outpatient Medications:   •  atenolol (TENORMIN) 25 MG tablet, ATENOLOL 25 MG TABS, Disp: , Rfl:   •  atorvastatin (LIPITOR) 40 MG tablet, Take 40 mg by mouth Daily., Disp: , Rfl:   •  CYANOCOBALAMIN PO, Take  by mouth Daily. B12 Gummie, Disp: , Rfl:   •  hydrALAZINE (APRESOLINE) 50 MG tablet, HYDRALAZINE HCL 50 MG TABS, Disp: , Rfl:   •  levothyroxine (SYNTHROID, LEVOTHROID) 75 MCG tablet, LEVOTHYROXINE SODIUM 75 MCG TABS, Disp: , Rfl:   •  Multiple Vitamin (MULTIVITAMIN) capsule, MULTIVITAMINS CAPS, Disp: , Rfl:   •  vitamin D (ERGOCALCIFEROL) 25076 units capsule capsule, TAKE ONE CAPSULE BY MOUTH ONCE EVERY WEEK FOR 84 DAYS, Disp: , Rfl: 1  •  amoxicillin-clavulanate (AUGMENTIN) 875-125 MG per tablet, Take 1 tablet by mouth 2 (Two) Times a Day for 7 days., Disp: 14 tablet, Rfl: 0  •  ciprofloxacin (CIPRO) 500 MG tablet, Take 1 tablet by mouth 2 (Two) Times a Day for 7 days., Disp: 14 tablet, Rfl: 0    No Known Allergies    Family History   Problem Relation Age of Onset   • Cancer Sister        Cancer-related family history includes Cancer in his sister.    Social History     Tobacco Use   • Smoking status: Former Smoker   • Smokeless tobacco: Never Used   Substance Use Topics   • Alcohol use: No     Frequency: Never   • Drug use: No         ROS:     Review of Systems   Constitutional: Positive for appetite change, fatigue and unexpected weight change. Negative for chills and fever.   HENT: Negative for ear pain, mouth sores, nosebleeds and sore throat.   "  Eyes: Negative for photophobia and visual disturbance.   Respiratory: Negative for wheezing and stridor.    Cardiovascular: Negative for chest pain and palpitations.   Gastrointestinal: Negative for abdominal pain, diarrhea, nausea and vomiting.   Endocrine: Negative for cold intolerance and heat intolerance.   Genitourinary: Negative for dysuria and hematuria.   Musculoskeletal: Negative for joint swelling and neck stiffness.   Skin: Negative for color change and rash.   Neurological: Negative for seizures and syncope.   Hematological: Negative for adenopathy.        No obvious bleeding   Psychiatric/Behavioral: Negative for agitation, confusion and hallucinations.   All other systems reviewed and are negative.      Objective:    Vitals:    08/19/19 1322   BP: 109/68   Pulse: 99   Resp: 20   Temp: 98.6 °F (37 °C)   Weight: 82.6 kg (182 lb)   Height: 188 cm (74\")   PainSc: 0-No pain     ECOG 2      Physical Exam:     Physical Exam   Constitutional: He is oriented to person, place, and time. He appears well-developed and well-nourished. No distress.   HENT:   Head: Normocephalic and atraumatic.   Eyes: Conjunctivae and EOM are normal. Right eye exhibits no discharge. Left eye exhibits no discharge. No scleral icterus.   Neck: Normal range of motion. Neck supple. No thyromegaly present.   Cardiovascular: Normal rate, regular rhythm and normal heart sounds. Exam reveals no gallop and no friction rub.   Pulmonary/Chest: Effort normal. No stridor. No respiratory distress. He has no wheezes.   Abdominal: Soft. Bowel sounds are normal. He exhibits no mass. There is no tenderness. There is no rebound and no guarding.   Musculoskeletal: Normal range of motion. He exhibits no tenderness.   Lymphadenopathy:     He has no cervical adenopathy.   Neurological: He is alert and oriented to person, place, and time. He exhibits normal muscle tone.   Skin: Skin is warm. No rash noted. He is not diaphoretic. No erythema.   Left leg " healing ulcer from skin cancer removal   Psychiatric: He has a normal mood and affect. His behavior is normal.   Nursing note and vitals reviewed.      Lab Results - Last 18 Months   Lab Units 08/19/19  1333 08/12/19  1719 03/14/19  1333   WBC 10*3/mm3 1.77* 1.61* 4.3*   HEMOGLOBIN g/dL 9.0* 9.1* 12.9*   HEMATOCRIT % 27.1* 27.7* 38.2*   PLATELETS 10*3/mm3 139* 139* 201   MCV fL 96.4 100.4* 90.8     Lab Results - Last 18 Months   Lab Units 03/14/19  1333   SODIUM mmol/L 137   POTASSIUM mmol/L 4.6   CHLORIDE mmol/L 102   TOTAL CO2 mmol/L 26   BUN mg/dL 18   CREATININE mg/dl 1.7*   CALCIUM mg/dL 9.3   GLUCOSE mg/dL 96       Lab Results   Component Value Date    GLUCOSE 96 03/14/2019    BUN 18 03/14/2019    CREATININE 1.7 (H) 03/14/2019    BCR 10.6 03/14/2019    K 4.6 03/14/2019    CO2 26 03/14/2019    CALCIUM 9.3 03/14/2019    PROTENTOTREF 7.0 08/12/2019    ALBUMIN 3.5 08/12/2019    LABIL2 1.0 08/12/2019     No results found for: IRON, TIBC, FERRITIN  No results found for: FOLATE  No results found for: OCCULTBLD  Lab Results   Component Value Date    RETICCTPCT 2.35 (H) 08/12/2019     Lab Results   Component Value Date    OEJUKXAM61 805 08/12/2019     Lab Results   Component Value Date    SPEP Comment 08/12/2019     No results found for: LDH, URICACID  Lab Results   Component Value Date    SEDRATE 107 (H) 08/12/2019     Lab Results   Component Value Date    HAPTOGLOBIN 175 08/12/2019     No results found for: PTT, INR  No results found for:   No results found for: CEA  No components found for: CA-19-9  No results found for: PSA          Assessment/Plan     Assessment:  1. Acute myeloid leukemia::-Patient presented with very severe leukopenia neutropenia.  Bone marrow aspirate and biopsy from 8/12/2019 shows evidence of AML with an estimated blast count of 65% in the bone marrow.  Unfortunately the bone marrow aspirate and core biopsy sample were damaged preventing a full analysis.  2. Chills: He denies any fevers.   But he has significant decrease in performance status since the last week.  He has neutropenia.  I am concerned if he has any infection.  3. History of kidney cancer/right breast cancer/melanoma  4. History of biclonal gammopathy: Recent SPEP shows IgA kappa monoclonal band M protein is 0.8, but no excess plasma cells reported in the bone marrow  5. Hx. Of multiple basal and squamous cell carcinomas.  6. Chronic kidney disease stage III: His creatinine has ranged around 1.6.          Plan:  1. Recommended patient to start low intensity AML treatment with hypo-methylating agent Dacogen.  Risks and benefits were explained.  Treatment is basically palliative.  He is not a candidate for high-intensity chemotherapy.  More personalized and specific targeted therapy can be directed once we have the AML FISH results.  2. We will repeat bone marrow aspiration today to get an additional information from FISH, cytogenetics etc.  3. Will need genetic Testing due to patient history of multiple malignancy and sister with breast cancer at early age.  Pretest counseling was provided.  4. Discussed with patient about getting admitted to the hospital since he looks sick today.  I am concerned he could be having get neutropenic infection.  But patient does not want to be admitted at this time .  I will therefore start him on prophylactic antibiotics.  Scription provided for Augmentin and ciprofloxacin.  5. We will also order pancultures blood cultures and urine cultures.  6. Plan is to start Dacogen from tomorrow          Leukopenia, unspecified type  - CBC & Differential  - Comprehensive Metabolic Panel  - CBC Auto Differential  - Blood Culture - Blood,  - XR Chest PA & Lateral  - amoxicillin-clavulanate (AUGMENTIN) 875-125 MG per tablet  - ciprofloxacin (CIPRO) 500 MG tablet    Shortness of breath  - XR Chest PA & Lateral  - amoxicillin-clavulanate (AUGMENTIN) 875-125 MG per tablet  - ciprofloxacin (CIPRO) 500 MG tablet    Urinary  "urgency  - Urinalysis With Microscopic - Urine, Clean Catch  - amoxicillin-clavulanate (AUGMENTIN) 875-125 MG per tablet  - ciprofloxacin (CIPRO) 500 MG tablet    Orders Placed This Encounter   Procedures   • Blood Culture - Blood,     neutropenia, chills   • XR Chest PA & Lateral     Standing Status:   Future     Standing Expiration Date:   2020     Order Specific Question:   Reason for Exam:     Answer:   shortness of air, cough   • Comprehensive Metabolic Panel   • CBC Auto Differential   • CBC & Differential     Order Specific Question:   Manual Differential     Answer:   No   • Urinalysis With Microscopic - Urine, Clean Catch                ============    BONE MARROW ASPIRATION PROCEDURE      =========      LOCATION:  Office    INDICATIONS:  AML    PROCEDURE:  After informed consent was obtained, the skin overlying the Right buttock was prepped and draped in sterile fashion.  A \"time-out\" was called at 2;30  a.m./p.m.    Patient identity was confirmed by:  ROGELIO CORTEZ confirmed name and  on ID band  Correct site confirmed as: Right posterior superior iliac crest Right posterior iliac crest  Procedure confirmed as: Right bone Marrow  Aspiration    At  2:40   p.m., pt was placed in a PRONE position and area was cleaned and draped in a sterile fashion and 2% plain Xylocaine was infiltrated into the skin and subcutaneous tissue.  Using a 15 gauge aspirate needle, the marrow cavity was entered and marrow withdrawn without complication. . Pressure was applied on the area to ensure hemostasis and followed by a sterile dressing. Patient was asked to lay on his back for half hour post procedure.  The patient tolerated the procedure well.       Sergio Stevens MD   2019              Thank you very much for providing the opportunity to participate in this patient’s care. Please do not hesitate to call if there are any other questions      I have reviewed all relevant labs results, imaging, vitals, medications " and plan with the patient today.    Portions of the note have been Scribed by medical assistant/Nurse.  I have reviewed and made changes accordingly.

## 2019-08-20 NOTE — PROGRESS NOTES
[facility] Medical Oncology     Education for Administration of Chemotherapy and/or Biotherapy     08/20/19    Rock Rock  [id number]    Mr.Harry HIEU Rock is here today for education on their upcoming Chemotherapy and/or Biotherapy.     I will be going over their treatment options, obtain signed consent and answer any questions that they may have in regards to the administration of Dacogen.     Rock Rock has already consulted with Dr Sergio Lares for the treatment of Acute Myeloid Leukemia. The provider has gone over the same treatment options with the patient and answered their question prior to today's visit.   The goal of the treatment is to:    [] Cure my cancer - means treatment that kills cancer cells to the point my doctor                                     cannot find them in my body and they will not grow back.    [x] Control my cancer - means treatment that keeps cancer from spreading or growing.    [] Relieve my cancer symptoms - means treatment that helps problems such as pain or                                     pressure.     This treatment has been explained to Rock Rock. Alternative methods of treatment, if any, have been explained to Rock Rock as have the benefits and risks of each. Based on the physician's explanation of the benefits and risks of this treatment and any alternatives available, The patient agrees that the potential benefit's out weighs the risks involved. I have explained to the patient the most likely complications that might occur from this treatment. The patient understands that along with the treatment additional medications may be necessary to lesson the side effects. Possible side effect may include but are not limited to, any of the following, or a combination of the following:      Allergic Reaction Vision/Eye Changes Sexual Effects    []  High Blood Pressure  [x]  Skin and nail changes  []  Menopausal symptoms   []  Hearing Loss []  Ulceration at  injection site []  Menstrual irregularities   [x]  Fatigue []  Skin rash   []  Fertility effects   [x]  Constipation  [x]  Diarrhea []  Hair loss  [x]  Light/temperature sensitivity []  Heart damage  []  Liver damage   [x]  Loss of appetite [x]  Dizziness []  Lung damage   [x]  Mouth Sores [x]  Muscle aching or weakness [x]  Kidney damage   [x]  Taste Changes [x]  Forgetfulness [x]  Nerve damage   [x]  Nausea or Vomiting []  Risk of blood clots [x]  Weight gain/loss   [x]  Secondary malignancies [x]  Risk of anemia  [x]  Risk of bleeding/bruising      While receiving treatment, it has been explained to the patient with regards to their blood counts. This may include but not limited to CBC, Neutropenia ,Anemia, Thrombocytopenia. This handout has been explained and given to the patient.     It was explained to the patient about nutrition and how important it is while undergoing Chemotherapy and/or Biotherapy. Certain medications will be prescribed during the treatment which may change the way foods taste or smell. These changes may cause poor or no appetite. Food is fuel for your body, and if it does not get the fuel it needs, your body may become mal-nourished, which can lead to sever fatigue.It was discussed with the patient about calories and how to add high-calorie foods to their diet.  Protein was also mentioned in regards to how this will help make new cells for the body. Information was given to Rock Rock in regards to some good protein sources.   We also discussed with the patient how important it was to drink/eat every 2-3 hours while awake. We discussed fluid intake of at least 6 8 ounce glassed of liquids per day to stay hydrated. Some of those are listed below:     Water  Juice (fruit or vegetable)  Soda Sport Drinks Soup   Milk  Ensure, Boost, Glucerna Ice Cream Popsicles Jello   Milkshakes Pudding  Gatorade Sherbert Yogurt     It has been discussed with the patient the risks of becoming pregnant  while receiving Chemotherapy and/or Biotherapy. We also discussed the importance of using reliable barrier methods while participating in intimate activities as this may expose their partners to a potentially harmful drug.     Further home instructions were given to the patient in regards to symptoms, treatment and how to handle those situations as well as when to contact the treatment team or the providers office.     Rock Rock was given handouts on:   1. Complete Blood Counts and terminology  2. Nutrition during Cancer Therapy   3. Home Instructions    I have discussed and gone over the full consent with the patient and answered all their questions regarding the medication they are to receive.  Written information has been provided and reviewed with [unfilled]. The patient and their family had a chance to ask any questions about the treatment medications and are satisfied with the information that was provided to them.     The patient has read and completed the consent form. They understand the possible risks and benefits of the recommended treatment plan and voluntarily agree to undergo the planned treatment. Should they change their mind and decide to stop treatment at any time, they will notify the providers office.       Una Morataya RN  [unfilled]  3:24 PM

## 2019-08-20 NOTE — TELEPHONE ENCOUNTER
April called stating patient is scheduled   for infusion today but does no know what time he is to come in.  Chart reviewed.  Informed April he is to come in at 1:00 today.  She v/lori Quintero

## 2019-08-26 PROBLEM — R53.83 FATIGUE: Status: ACTIVE | Noted: 2019-01-01

## 2019-08-26 NOTE — PROGRESS NOTES
HEMATOLOGY ONCOLOGY OUTPATIENT FOLLOW UP      Patient name: Rock Rock  : 1940  MRN: [id number]  Primary Care Physician: Jovana Nur APRN  Referring Physician: Jovana Nur APRN  Reason For Consult:     No chief complaint on file.    Acute myeloid leukemia  Weakness  Fevers  Anemia    HPI:   History of Present Illness:  79-year-old male presents to the office today for consultation in regards to his abnormal blood cell counts.  He was referred by his primary care provider, Jovana Nur NP, after lab work revealed a white blood cell count of 1.0 on 2019.  Review of CBC from 3/14/2019 shows a white blood cell count of 4.3.  Previously, he had a normal white count of 8.0 and a hemoglobin of 13.2 on 7/3/2018.  Patient has a history of multiple cancers in the past.    · 3/14/2019 --WBC 4.3, hemoglobin 12.9, MCV 90.8, platelets 201,000.  Creatinine 1.7  · 2019 --WBC 1.0, hemoglobin 9.4, .7, platelets 150,000, ANC 0.3.  Absolute blasts 0.13.  Ferritin 518.2, iron 53, , iron sat 25, no folate or B12 deficiency.  BUN 20, creatinine 1.6    Patient was seen briefly in  by Dr. Pulido for diagnosis of biclonal gammopathy but was lost to follow-up.  Patient had a past history of renal cell carcinoma with left nephrectomy in .  He was followed by Dr. Hummel and was found to have elevated creatinine.  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  Skeletal survey showed degenerative changes only.  Patient also has history of right breast cancer diagnosed in  he had the tumor removed at [facility] and subsequent axillary lymph node dissection.  He has also had multiple melanoma skin cancers removed beginning in .  He has had a recent removal of both basal cell and squamous cell carcinomas from the skin.    Lives in [place] with his wife.  He is retired from auto parts sales.   Has 5 adult children.  Previous history of cigarette use quit in 1986.  No alcohol no illicit drug use.  Sister with Breast cancer in her 40's no other family history of hematologic disorder or malignancy.     8/12/2019: SPEP shows M protein of 0.8 g/dL, immunofixation shows IgA kappa monoclonal gammopathy  Reticulocyte count is 2.35% ESR is 107, B12 is 805, haptoglobin 175, WBC 1.6, hemoglobin 9.1, platelets 139K,     8/12/2019: Left posterior iliac crest bone marrow aspirate and core biopsy: Smears are consistent with AML with the 65% blast percentage, blasts or CD11c positive, CD13 positive, CD33 positive, CD34 negative, CD38 positive, CD64 dim +, +, HLA-DR-  Peripheral blood flow cytometry showed 14% blasts.    8/19/2019 repeat bone marrow aspiration: Acute myeloid leukemia with a 60% blasts, by  stain, blasts are CD13 positive, CD33 positive moderate, CD34 negative, CD38 positive, + and HLA-DR minus.  Marrow cellularity 80%, a small population of clonal B cells with CLL phenotype noted.  Flow cytometry shows 55% immature myeloid precursors with +,  cytogenetics trisomy 21', 46 XY, +21  CD13 positive, CD33 positive moderate, CD34 negative, CD38 positive, +   NGS myeloid panel by [facility] :-DNMT3A  alteration positive, IDH 2 alteration positive, NPM 1+, low level of FLT3 ITD    8/19/2019 WBC 1.77, hemoglobin 9.0, platelets 139, neutrophils 120  8/26/2019 WBC 2.03, hemoglobin 7.9, platelets 116, MCV 98.3  8/20/2019 patient started on Dacogen 20 mg/m².  Daily original plan was 5 days, later changed to 10 days    Subjective:    08/19/19 He presents for his Dacogen treatment.  Patient has had about a 17 lb weight loss in one week, reports no appetite and unable to keep anything down.  Admits to feeling tired.  Fevers are resolved /afebrile.  Denies any night sweats/fevers. Denies any pain.  [facility]. She states he is getting weaker by the day. She  asks questions regarding treatment and side effects.  He has been taking antibiotics..  He does report nausea but it is well managed response to antiemetics Zofran.  Denies any nausea from treatment.  Denies any mouth sores.      The following portions of the patient's history were reviewed and updated as appropriate: allergies, current medications, past family history, past medical history, past social history, past surgical history and problem list.         Past Medical History:   Diagnosis Date   • Biclonal gammopathy     Diagnosed in 2011   • Breast cancer (CMS/HCC)     History of breast cancer in 1991 status post mastectomy and lymph node dissection   • Cancer of kidney (CMS/HCC)     Left kidney cancer status post nephrectomy 2006   • Chronic kidney disease (CKD), stage III (moderate) (CMS/HCC)    • Dyslipidemia    • History of skin cancer     History of multiple skin cancers in 1994   • Hypertension        Past Surgical History:   Procedure Laterality Date   • MASTECTOMY      Right mastectomy   • NEPHRECTOMY      Left nephrectomy         Current Outpatient Medications:   •  amoxicillin-clavulanate (AUGMENTIN) 875-125 MG per tablet, Take 1 tablet by mouth 2 (Two) Times a Day for 7 days., Disp: 14 tablet, Rfl: 0  •  atenolol (TENORMIN) 25 MG tablet, ATENOLOL 25 MG TABS, Disp: , Rfl:   •  atorvastatin (LIPITOR) 40 MG tablet, Take 40 mg by mouth Daily., Disp: , Rfl:   •  ciprofloxacin (CIPRO) 500 MG tablet, Take 1 tablet by mouth 2 (Two) Times a Day for 7 days., Disp: 14 tablet, Rfl: 0  •  CYANOCOBALAMIN PO, Take  by mouth Daily. B12 Gummie, Disp: , Rfl:   •  hydrALAZINE (APRESOLINE) 50 MG tablet, HYDRALAZINE HCL 50 MG TABS, Disp: , Rfl:   •  levothyroxine (SYNTHROID, LEVOTHROID) 75 MCG tablet, LEVOTHYROXINE SODIUM 75 MCG TABS, Disp: , Rfl:   •  Multiple Vitamin (MULTIVITAMIN) capsule, MULTIVITAMINS CAPS, Disp: , Rfl:   •  ondansetron (ZOFRAN) 4 MG tablet, Take 2 tablets by mouth Every 8 (Eight) Hours As Needed  for Nausea or Vomiting., Disp: 30 tablet, Rfl: 3  •  vitamin D (ERGOCALCIFEROL) 03900 units capsule capsule, TAKE ONE CAPSULE BY MOUTH ONCE EVERY WEEK FOR 84 DAYS, Disp: , Rfl: 1  No current facility-administered medications for this visit.     No Known Allergies    Family History   Problem Relation Age of Onset   • Cancer Sister        Cancer-related family history includes Cancer in his sister.    Social History     Tobacco Use   • Smoking status: Former Smoker   • Smokeless tobacco: Never Used   Substance Use Topics   • Alcohol use: No     Frequency: Never   • Drug use: No         ROS:     Review of Systems   Constitutional: Positive for appetite change, fatigue and unexpected weight change. Negative for chills and fever.   HENT: Negative for ear pain, mouth sores, nosebleeds and sore throat.    Eyes: Negative for photophobia and visual disturbance.   Respiratory: Negative for wheezing and stridor.    Cardiovascular: Negative for chest pain and palpitations.   Gastrointestinal: Negative for abdominal pain, diarrhea, nausea and vomiting.   Endocrine: Negative for cold intolerance and heat intolerance.   Genitourinary: Negative for dysuria and hematuria.   Musculoskeletal: Negative for joint swelling and neck stiffness.   Skin: Negative for color change and rash.   Neurological: Negative for seizures and syncope.   Hematological: Negative for adenopathy.        No obvious bleeding   Psychiatric/Behavioral: Negative for agitation, confusion and hallucinations.   All other systems reviewed and are negative.      Objective:    There were no vitals filed for this visit.  ECOG 2      Physical Exam:     Physical Exam   Constitutional: He is oriented to person, place, and time. He appears well-developed and well-nourished. No distress.   HENT:   Head: Normocephalic and atraumatic.   Eyes: Conjunctivae and EOM are normal. Right eye exhibits no discharge. Left eye exhibits no discharge. No scleral icterus.   Neck: Normal  range of motion. Neck supple. No thyromegaly present.   Cardiovascular: Normal rate, regular rhythm and normal heart sounds. Exam reveals no gallop and no friction rub.   Pulmonary/Chest: Effort normal. No stridor. No respiratory distress. He has no wheezes.   Abdominal: Soft. Bowel sounds are normal. He exhibits no mass. There is no tenderness. There is no rebound and no guarding.   Musculoskeletal: Normal range of motion. He exhibits no tenderness.   Lymphadenopathy:     He has no cervical adenopathy.   Neurological: He is alert and oriented to person, place, and time. He exhibits normal muscle tone.   Skin: Skin is warm. No rash noted. He is not diaphoretic. No erythema.   Left leg healing ulcer from skin cancer removal   Psychiatric: He has a normal mood and affect. His behavior is normal.   Nursing note and vitals reviewed.      Lab Results - Last 18 Months   Lab Units 08/26/19  1227 08/23/19  1148 08/19/19  1333   WBC 10*3/mm3 2.03* 1.83* 1.77*   HEMOGLOBIN g/dL 7.9* 8.1* 9.0*   HEMATOCRIT % 23.6* 24.3* 27.1*   PLATELETS 10*3/mm3 116* 106* 139*   MCV fL 98.3* 99.6* 96.4     Lab Results - Last 18 Months   Lab Units 08/23/19  1148 08/19/19  1333 03/14/19  1333   SODIUM mmol/L 134* 129* 137   POTASSIUM mmol/L 3.4* 4.0 4.6   CHLORIDE mmol/L 101 95* 102   TOTAL CO2 mmol/L  --   --  26   CO2 mmol/L 19.0* 19.0*  --    BUN mg/dL 24* 28* 18   CREATININE mg/dL 2.00* 2.30* 1.7*   CALCIUM mg/dL 7.9* 8.4* 9.3   BILIRUBIN mg/dL 0.9 1.4*  --    ALK PHOS U/L 48 51  --    ALT (SGPT) U/L 47 34  --    AST (SGOT) U/L 39 40  --    GLUCOSE mg/dL 117* 175* 96       Lab Results   Component Value Date    GLUCOSE 117 (H) 08/23/2019    BUN 24 (H) 08/23/2019    CREATININE 2.00 (H) 08/23/2019    EGFRIFNONA 32 (L) 08/23/2019    BCR 12.0 08/23/2019    K 3.4 (L) 08/23/2019    CO2 19.0 (L) 08/23/2019    CALCIUM 7.9 (L) 08/23/2019    PROTENTOTREF 7.0 08/12/2019    ALBUMIN 2.90 (L) 08/23/2019    LABIL2 1.0 08/12/2019    AST 39 08/23/2019    ALT  47 08/23/2019     No results found for: IRON, TIBC, FERRITIN  No results found for: FOLATE  No results found for: OCCULTBLD  Lab Results   Component Value Date    RETICCTPCT 2.35 (H) 08/12/2019     Lab Results   Component Value Date    [id number] 805 08/12/2019     Lab Results   Component Value Date    SPEP Comment 08/12/2019     No results found for: LDH, URICACID  Lab Results   Component Value Date    SEDRATE 107 (H) 08/12/2019     Lab Results   Component Value Date    HAPTOGLOBIN 175 08/12/2019     No results found for: PTT, INR  No results found for:   No results found for: CEA  No components found for: CA-19-9  No results found for: PSA          Assessment/Plan     Assessment:  1. Acute myeloid leukemia::-With approximately 60% CD13 positive, CD33 positive moderate, CD34 negative, CD38 positive, +  blasts in bone marrow.  Patient presented with very severe leukopenia neutropenia.  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  Molecular testing by NGS shows DNMT3A  alteration positive, IDH 2 alteration positive, NPM 1+, low level of FLT3 ITD.  Patient has advanced age and is not a candidate for high-intensity therapy and is not a candidate for transplantation.  He has been started on low intensity therapy with hypo-methylating agent, Dacogen.  Both the IDH mutation and DNMT mutations affect DNA methylation, therefore hypo-methylating therapy may be helpful.  2. Chills: Resolved.  He has been treated with antibiotics last week.  3. History of kidney cancer/right breast cancer/melanoma: We have ordered genetic testing.  We will have to follow-up those results.  4. History of biclonal gammopathy: Recent SPEP shows IgA kappa monoclonal band M protein is 0.8, but no excess plasma cells reported in the bone marrow  5. Hx. Of multiple basal and squamous cell carcinomas.  6. Chronic kidney disease stage III: His creatinine has ranged around 1.6.  7. Nausea: Managed with the anti-medics          Plan:  1. Again  discussed the risks and benefits of the treatment with hypo-methylating agents.  2. To improve his response rates, will change Dacogen to 20 mg/m² day 1 through day 10.  Plan is for a total of 4 cycles. We will also consider adding venetoclax.  We will also consider IDH inhibitors  3. We will follow-up genetic Testing due to patient history of multiple malignancy and sister with breast cancer at early age.  Pretest counseling was provided.  4. Patient understands that if he develops fevers, he needs to get admitted to the hospital.  5. Reviewed recent blood cultures and urine cultures mother have come back negative...  6. Recommend neutropenic precautions  7. We will monitor CBC and transfuse as needed  8. Follow-up in 1 to 2 weeks          Acute myeloid leukemia not having achieved remission (CMS/HCC)  - CBC & Differential  - CBC Auto Differential    Orders Placed This Encounter   Procedures   • CBC Auto Differential   • CBC & Differential     Order Specific Question:   Manual Differential     Answer:   No                 Thank you very much for providing the opportunity to participate in this patient’s care. Please do not hesitate to call if there are any other questions      I have reviewed all relevant labs results, imaging, vitals, medications and plan with the patient today.    Portions of the note have been Scribed by medical assistant/Nurse.  I have reviewed and made changes accordingly.

## 2019-08-27 PROBLEM — B37.0 ORAL THRUSH: Status: ACTIVE | Noted: 2019-01-01

## 2019-08-27 PROBLEM — B00.2 ORAL HERPES: Status: ACTIVE | Noted: 2019-01-01

## 2019-08-27 NOTE — PROGRESS NOTES
Patient to office today for chemo. Patient complains of white tongue and sores in the corner of his mouth. Patient also complains of decreased appetite over the last month. Notified NP

## 2019-08-27 NOTE — PROGRESS NOTES
Case Management/ Note    Patient Name: Rock Rock  YOB: 1940  MRN #: [id number]    OSW met with patient and his wife Amie at the request of REGINA Regan. Patient is alert and oriented to person, place and time. He is pleasant and likes to tell jokes. They told REGINA Regan they had difficulty with transportation. His [place] Medicaid does not cover insurance. OSW called CO officer, Dank and asked if they had a [employer] van. He said the patient was using their van for medical appointments. Made transportation arrangements for 8/28-8/30, 9/3 and 9/4 for infusion appointments with Dank.     OSW spoke with Rock and Amie and let them know transportation was arranged for their appointments. They gave v/u that [facility] would pick them up.     They said basic needs are met; they do access food pantries. He states he is able to do most ADLs / IADLs without assistance. She said he helps him. They were offered a referral with [employer] for the A&D waiver  but they have declined at this time as he does not want someone in their house. Further, he said they have family and friends that will help them if needed.     OSW will remain available.     Electronically signed by:   Sachi Hicks LCSW, OSW-C  08/27/19, 3:42 PM

## 2019-08-29 NOTE — TELEPHONE ENCOUNTER
We are not sure if patient will go into remission.  But he will benefit from comprehensive hereditary cancer genetic testing.  So we can set up a skin biopsy through dermatologist if that is what they need

## 2019-08-29 NOTE — PROGRESS NOTES
Pt here for C1D7 Dacogen and reports ongoing feelings of fatigue, being tired.  CBC obtained and results reviewed with Kavitha Matthews NP.  Pt to receive Type/Cross today and have PRBC transfusion tomm.   Reviewed with pt and spouse and v/u and agreement.  Called and spoke with Danita at [facility] and pt to go after treatment today.

## 2019-09-03 PROBLEM — E86.0 DEHYDRATION: Status: ACTIVE | Noted: 2019-01-01

## 2019-09-03 NOTE — PROGRESS NOTES
Patient here for C1D9 and his PLTS are 30 and Hbg 7.3.  He has also had a 5 lb weight loss as well as not drinking well.  Per Kavitha NP give patient 500 NS over 1 hour

## 2019-09-03 NOTE — PROGRESS NOTES
Patient needs blood transfusion tomorrow called and LVM for ambulatory care.  Patient aware, advised that either I or [facility] care will call in AM.

## 2019-09-04 PROBLEM — R19.7 DIARRHEA: Status: ACTIVE | Noted: 2019-01-01

## 2019-09-04 PROBLEM — D70.8 OTHER NEUTROPENIA (HCC): Status: ACTIVE | Noted: 2019-01-01

## 2019-09-04 NOTE — PROGRESS NOTES
"                 Nutrition Assessment  Rock Rock    MST Score: 3    Anthropometrics  Height: 6'2\"  Weight: 179.8#  BMI: 23.1  Weight Hx:   (5/2/17) 227#  (12/8/17) 222.6#  (8/12/19) 199.2#  (8/23/19) 184#  (8/29/19) 184#  IBW: 190# (94.6%  UBW: >200# (pt unsure of actual weight) 89.9%    Nutrition Questionnaire    Food Allergies: Pt denied allergies at this time    Chewing/Swallowing Problems: Pt denied chewing and swallowing problems at this time. Pt has dentures and wears them regularly.    Who does the cooking & shopping: Pt's wife.    Nausea/Vomiting/Diarrhea: Pt controls nausea with meds; denies vomiting but complains of dry heaving; pt has diarrhea daily    Appetite: Pt wants to eat, but is unable to eat a significant amount of food due to discomfort and feeling sick every time he tries to eat.    24-Hour Diet Recall:   Breakfast - Boost milkshake made with ice cream, orange juice  Snack - popsicle    Discussion: Met with pt to provide neutropenic diet education and address other symptoms the pt is experiencing that can improve with diet changes. We reviewed food-safety protocols and foods to avoid per neutropenic guidelines, pt verbalized understanding. We discussed foods the pt can have during times of nausea to help increase his overall caloric intake, the pt said he will try. I explained the importance of consuming adequate calories and protein to prevent further weight and muscle loss, for optimal nutrition, and to have the energy for treatment, pt verbalized understanding. I encouraged the pt to have 2-3 oral-nutrition supplements/day, pt said he will try. I encouraged the pt to give the materials to his wife for review since she does the cooking and shopping, pt said he will. All questions and concerns were addressed and answered. I provided my contact information and encouraged him to call or schedule an appointment as needed.    Janett Hughes, MS,RD,LD-[place],CD-[place]  Registered Dietitian            "

## 2019-09-04 NOTE — TELEPHONE ENCOUNTER
Marilyn called stating pt is supposed to be receiving Dacogen today, however pt's ANC is 0.00. She would like to know if Dr. Stevens would still like to pt to get Dacogen today. Dr. Stevens states pt can get treatment today, however he needs to be started on Cipro, Bactrim DS and Acyclovir. I talked to Kavitha Matthews NP to see what dose pt need to take of these medications and how often. Kavitha states pt needs to take Cipro 500 mg BID x 7 days, Bactrim DS 1 tab daily and Acyclovir 400 mg po BID. Rx's were escribed to pt's preferred pharmacy.

## 2019-09-04 NOTE — PROGRESS NOTES
Pt was sent to hospital registration to be admitted. I spoke with Linsey in bed control an she verbalized understanding of patient being at the hospital in registration waiting on room.

## 2019-09-10 NOTE — PROGRESS NOTES
HEMATOLOGY ONCOLOGY OUTPATIENT FOLLOW UP      Patient name: Rock Rock  : 1940  MRN: [id number]  Primary Care Physician: Jovana Nur APRN  Referring Physician: Jovana Nur APRN  Reason For Consult:     Chief Complaint   Patient presents with   • Follow-up     Acute myeloid leukemia     Acute myeloid leukemia  Weakness  Diarrhea  Anemia    HPI:   History of Present Illness:  79-year-old male presents to the office today for consultation in regards to his abnormal blood cell counts.  He was referred by his primary care provider, Jovana Nur NP, after lab work revealed a white blood cell count of 1.0 on 2019.  Review of CBC from 3/14/2019 shows a white blood cell count of 4.3.  Previously, he had a normal white count of 8.0 and a hemoglobin of 13.2 on 7/3/2018.  Patient has a history of multiple cancers in the past.    · 3/14/2019 --WBC 4.3, hemoglobin 12.9, MCV 90.8, platelets 201,000.  Creatinine 1.7  · 2019 --WBC 1.0, hemoglobin 9.4, .7, platelets 150,000, ANC 0.3.  Absolute blasts 0.13.  Ferritin 518.2, iron 53, , iron sat 25, no folate or B12 deficiency.  BUN 20, creatinine 1.6    Patient was seen briefly in  by Dr. Pulido for diagnosis of biclonal gammopathy but was lost to follow-up.  Patient had a past history of renal cell carcinoma with left nephrectomy in .  He was followed by Dr. Hummel and was found to have elevated creatinine.  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  Skeletal survey showed degenerative changes only.  Patient also has history of right breast cancer diagnosed in  he had the tumor removed at [facility] and subsequent axillary lymph node dissection.  He has also had multiple melanoma skin cancers removed beginning in .  He has had a recent removal of both basal cell and squamous cell carcinomas from the skin.    Lives in  [place] with his wife.  He is retired from auto parts sales.  Has 5 adult children.  Previous history of cigarette use quit in 1986.  No alcohol no illicit drug use.  Sister with Breast cancer in her 40's no other family history of hematologic disorder or malignancy.     8/12/2019: SPEP shows M protein of 0.8 g/dL, immunofixation shows IgA kappa monoclonal gammopathy  Reticulocyte count is 2.35% ESR is 107, B12 is 805, haptoglobin 175, WBC 1.6, hemoglobin 9.1, platelets 139K,     8/12/2019: Left posterior iliac crest bone marrow aspirate and core biopsy: Smears are consistent with AML with the 65% blast percentage, blasts or CD11c positive, CD13 positive, CD33 positive, CD34 negative, CD38 positive, CD64 dim +, +, HLA-DR-  Peripheral blood flow cytometry showed 14% blasts.    8/19/2019 repeat bone marrow aspiration: Acute myeloid leukemia with a 60% blasts, by  stain, blasts are CD13 positive, CD33 positive moderate, CD34 negative, CD38 positive, + and HLA-DR minus.  Marrow cellularity 80%, a small population of clonal B cells with CLL phenotype noted.  Flow cytometry shows 55% immature myeloid precursors with +,  cytogenetics trisomy 21', 46 XY, +21  CD13 positive, CD33 positive moderate, CD34 negative, CD38 positive, +   NGS myeloid panel by [facility] :-DNMT3A  alteration positive, IDH 2 alteration positive, NPM 1+, low level of FLT3 ITD    8/19/2019 WBC 1.77, hemoglobin 9.0, platelets 139, neutrophils 120  8/26/2019 WBC 2.03, hemoglobin 7.9, platelets 116, MCV 98.3  8/20/2019 patient started on Dacogen 20 mg/m².  Daily original plan was 5 days, later changed to 10 days    08/19/19 He presents for his Dacogen treatment.  Patient has had about a 17 lb weight loss in one week, reports no appetite and unable to keep anything down.  Admits to feeling tired.  Fevers are resolved /afebrile.  Denies any night sweats/fevers. Denies any pain.  [facility].  She states he is getting weaker by the day. She asks questions regarding treatment and side effects.  He has been taking antibiotics..  He does report nausea but it is well managed response to antiemetics Zofran.  Denies any nausea from treatment.  Denies any mouth sores.    8/20/2019 to 9/4/2019 -patient received cycle 1 of Dacogen 20 mg/m² x 10 days   8/23/2019 creatinine 2.0, albumin 2.9, WBC 1.83, hemoglobin 8.1, platelets 106k  9/4/2019 to 9/8/2019: Patient was admitted at [facility] with diarrhea, weakness, neutropenic fever renal failure due to dehydration, hypotension..  C. difficile testing was negative.  He continues on prophylactic antibiotics including Bactrim fluconazole and acyclovir.  He was treated with levofloxacin.    9/4/2019 WBC 3.3, hemoglobin 7.2, platelets 44  9/5/2019 WBC 1.7, hemoglobin 7.8, platelets 34K  9/7/2019 WBC 0.9, hemoglobin 7.6, platelets 22K, ANC 10  9/8/2019 creatinine 1.6  9/11/2019 WBC 1.84, hemoglobin 9.5, platelets 32K,  9/16/2019 WBC 2.6, hemoglobin 8.8, platelets 51,000, MCV 95.8      Subjective:   The patient is here today for follow up, his wife and her sister are present. He reports wanting to eat but not being able to due to persistent diarrhea. He reports his diarrhea is three times per day. His wife reports using every type of cream she can find on his bottom for his rash, but she hasn't been able to find what will help. The wife reports he has not yet started his new chemotherapy pill. The patient's family is wanting to have toileting supplies ordered through their home health.   He also reports swelling of his left hand.  Continues to remain very weak and is on wheelchair.  He can ambulate with a walker at home.  No more fevers.  Has low platelets, no bleeding.  Continues to take antibiotics      The following portions of the patient's history were reviewed and updated as appropriate: allergies, current medications, past family history, past medical  history, past social history, past surgical history and problem list.         Past Medical History:   Diagnosis Date   • Anemia    • Biclonal gammopathy     Diagnosed in 2011   • Breast cancer (CMS/HCC)     History of breast cancer in 1991 status post mastectomy and lymph node dissection   • Cancer of kidney (CMS/HCC)     Left kidney cancer status post nephrectomy 2006   • Chronic kidney disease (CKD), stage III (moderate) (CMS/HCC)    • Dyslipidemia    • History of skin cancer     History of multiple skin cancers in 1994   • Hypertension        Past Surgical History:   Procedure Laterality Date   • MASTECTOMY      Right mastectomy   • NEPHRECTOMY      Left nephrectomy         Current Outpatient Medications:   •  acetaminophen (TYLENOL) 500 MG tablet, Take 500 mg by mouth Every 6 (Six) Hours As Needed for Mild Pain ., Disp: , Rfl:   •  acyclovir (ZOVIRAX) 400 MG tablet, Take 1 tablet by mouth 2 (Two) Times a Day. Take no more than 5 doses a day., Disp: 60 tablet, Rfl: 0  •  ATENOLOL PO, ATENOLOL 12.5 MG TAB one time daily, Disp: , Rfl:   •  clotrimazole (MYCELEX) 10 MG kaitlyn, Take 1 tablet by mouth 3 (Three) Times a Day. For 5 days then PRN oral thrush., Disp: 30 tablet, Rfl: 1  •  hydrALAZINE (APRESOLINE) 50 MG tablet, HYDRALAZINE HCL 50 MG TABS TID, Disp: , Rfl:   •  levothyroxine (SYNTHROID, LEVOTHROID) 75 MCG tablet, LEVOTHYROXINE SODIUM 75 MCG TABS, Disp: , Rfl:   •  Multiple Vitamin (MULTIVITAMIN) capsule, MULTIVITAMINS CAPS, Disp: , Rfl:   •  ondansetron (ZOFRAN) 4 MG tablet, Take 2 tablets by mouth Every 8 (Eight) Hours As Needed for Nausea or Vomiting., Disp: 30 tablet, Rfl: 3  •  vitamin D (ERGOCALCIFEROL) 97048 units capsule capsule, TAKE ONE CAPSULE BY MOUTH ONCE EVERY WEEK FOR 84 DAYS, Disp: , Rfl: 1  •  atorvastatin (LIPITOR) 40 MG tablet, Take 40 mg by mouth Daily., Disp: , Rfl:   •  CYANOCOBALAMIN PO, Take  by mouth Daily. B12 Gummie, Disp: , Rfl:   •  fluconazole (DIFLUCAN) 200 MG tablet, Take 1  tablet by mouth Daily for 26 doses., Disp: 30 tablet, Rfl: 0  •  sulfamethoxazole-trimethoprim (BACTRIM DS,SEPTRA DS) 800-160 MG per tablet, Take 1 tablet by mouth Daily., Disp: 30 tablet, Rfl: 0  •  Venetoclax 100 MG tablet, Take 1 tablet by mouth Daily. 1 tab day 1, 2 tabs day 2, 4 tabs daily starting day 3, Disp: 120 tablet, Rfl: 0    No Known Allergies    Family History   Problem Relation Age of Onset   • Cancer Sister        Cancer-related family history includes Cancer in his sister.    Social History     Tobacco Use   • Smoking status: Former Smoker   • Smokeless tobacco: Never Used   Substance Use Topics   • Alcohol use: No     Frequency: Never   • Drug use: No         ROS:     Review of Systems   Constitutional: Positive for appetite change, fatigue and unexpected weight change. Negative for chills and fever.   HENT: Negative for ear pain, mouth sores, nosebleeds and sore throat.    Eyes: Negative for photophobia and visual disturbance.   Respiratory: Positive for wheezing (currently on antibiotic for chest). Negative for stridor.    Cardiovascular: Negative for chest pain and palpitations.   Gastrointestinal: Positive for abdominal pain and diarrhea (with most meals). Negative for nausea and vomiting.   Endocrine: Negative for cold intolerance and heat intolerance.   Genitourinary: Negative for dysuria and hematuria.   Musculoskeletal: Negative for joint swelling and neck stiffness.   Skin: Positive for rash (peritoneal area). Negative for color change.   Neurological: Negative for seizures and syncope.   Hematological: Negative for adenopathy.        No obvious bleeding   Psychiatric/Behavioral: Positive for confusion (loses time with sleeping). Negative for agitation and hallucinations.   All other systems reviewed and are negative.      Objective: Vital signs reviewed    Vitals:    09/16/19 1138   BP: 95/66   Pulse: 101   Resp: 16   Temp: 97.4 °F (36.3 °C)   Weight: 89.8 kg (198 lb)   Height: 188 cm  "(74\")   PainSc: 0-No pain     ECOG 2      Physical Exam:     Physical Exam   Constitutional: He is oriented to person, place, and time. He appears well-developed and well-nourished. No distress.   HENT:   Head: Normocephalic and atraumatic.   Eyes: Conjunctivae and EOM are normal. Right eye exhibits no discharge. Left eye exhibits no discharge. No scleral icterus.   Neck: Normal range of motion. Neck supple. No thyromegaly present.   Cardiovascular: Normal rate, regular rhythm and normal heart sounds. Exam reveals no gallop and no friction rub.   Pulmonary/Chest: Effort normal. No stridor. No respiratory distress. He has no wheezes.   Abdominal: Soft. Bowel sounds are normal. He exhibits no mass. There is no tenderness. There is no rebound and no guarding.   Musculoskeletal: Normal range of motion. He exhibits edema. He exhibits no tenderness.   Edema/lymphedema of the right upper extremity   Lymphadenopathy:     He has no cervical adenopathy.   Neurological: He is alert and oriented to person, place, and time. He exhibits normal muscle tone.   Skin: Skin is warm. No rash noted. He is not diaphoretic. No erythema.   Left leg healing ulcer from skin cancer removal   Psychiatric: He has a normal mood and affect. His behavior is normal.   Nursing note and vitals reviewed.      Lab Results - Last 18 Months   Lab Units 09/16/19  1146 09/11/19  0944 09/08/19  0248   WBC 10*3/mm3 2.67* 1.84* 0.80*   HEMOGLOBIN g/dL 8.8* 9.5* 7.6*   HEMATOCRIT % 27.3* 29.2* 22.9*   PLATELETS 10*3/mm3 51* 32* 24*   MCV fL 95.8 95.7 96.9     Lab Results - Last 18 Months   Lab Units 09/08/19  0248 09/07/19  0810 09/06/19  0318  09/04/19  2026   SODIUM mmol/L 138 139 137   < > 135*   POTASSIUM mmol/L 3.4* 3.4* 3.5*   < > 4.0   CHLORIDE mmol/L 103 105 105   < > 98*   CO2 mmol/L 24.0 20.0* 21.0*   < > 21.0*   BUN mg/dL 24* 23* 21*   < > 24*   CREATININE mg/dL 1.60* 1.70* 1.80*   < > 2.10*   CALCIUM mg/dL 7.4* 7.4* 7.4*   < > 8.0*   BILIRUBIN " mg/dL 0.9 1.0  --   --  1.3*   ALK PHOS U/L 39 39  --   --  40   ALT (SGPT) U/L 49 34  --   --  21   AST (SGOT) U/L 69* 36  --   --  19   GLUCOSE mg/dL 123* 126* 106*   < > 113*    < > = values in this interval not displayed.       Lab Results   Component Value Date    GLUCOSE 123 (H) 09/08/2019    BUN 24 (H) 09/08/2019    CREATININE 1.60 (H) 09/08/2019    EGFRIFNONA 42 (L) 09/08/2019    BCR 15.0 09/08/2019    K 3.4 (L) 09/08/2019    CO2 24.0 09/08/2019    CALCIUM 7.4 (L) 09/08/2019    PROTENTOTREF 7.0 08/12/2019    ALBUMIN 1.90 (L) 09/08/2019    LABIL2 1.0 08/12/2019    AST 69 (H) 09/08/2019    ALT 49 09/08/2019     No results found for: IRON, TIBC, FERRITIN  No results found for: FOLATE  No results found for: OCCULTBLD  Lab Results   Component Value Date    RETICCTPCT 2.35 (H) 08/12/2019     Lab Results   Component Value Date    VTFJCTTW85 805 08/12/2019     Lab Results   Component Value Date    SPEP Comment 08/12/2019     No results found for: LDH, URICACID  Lab Results   Component Value Date    SEDRATE 107 (H) 08/12/2019     Lab Results   Component Value Date    HAPTOGLOBIN 175 08/12/2019     No results found for: PTT, INR  No results found for:   No results found for: CEA  No components found for: CA-19-9  No results found for: PSA          Assessment/Plan     Assessment:  1. Acute myeloid leukemia::-With approximately 60% CD13 positive, CD33 positive moderate, CD34 negative, CD38 positive, +  blasts in bone marrow.  Patient presented with very severe leukopenia neutropenia.  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  Molecular testing by NGS shows DNMT3A  alteration positive, IDH 2 alteration positive, NPM 1+, low level of FLT3 ITD.  Patient has advanced age and is not a candidate for high-intensity therapy and is not a candidate for transplantation.  He has been started on low intensity therapy with hypo-methylating agent, Dacogen.  Both the IDH mutation and DNMT mutations affect DNA methylation,  therefore hypo-methylating therapy may be helpful.  He has been initiated on hypo-methylating agents.  He has received cycle 1 from 8/20/2019 to 9/4/2019.  His platelets seem to be recovering and also white count seems to be improving..  2. Neutropenia: He does not have fever.  He is on prophylactic antibiotics.  3. Diarrhea: Improved to about 2-3 times a day.  C. difficile was negative recently..  He also has perianal rash due to the diarrhea which is causing a lot of discomfort.  4. Perineal rash due to irritation from chronic diarrhea.  5. History of kidney cancer/right breast cancer/melanoma: We have ordered genetic testing.  We will have to follow-up those results.  6. History of biclonal gammopathy: Recent SPEP shows IgA kappa monoclonal band M protein is 0.8, but no excess plasma cells reported in the bone marrow  7. Hx. Of multiple basal and squamous cell carcinomas.  8. Chronic kidney disease stage III: His creatinine has ranged around 1.6.  Recently was admitted and was given hydration with improvement in creatinine.  9. Nausea: Managed with the anti-medics          Plan:  1. We will resume Dacogen cycle 2 from next week 1 day.  He will be treated only with the 5 days x 20 mg daily dosing  2. We will have home health manage his perianal rash with topical creams.  3. Continue to monitor CBC, at least weekly.  Counts seem to be improving.  4. Awaiting to start venetoclax with his current regimen.  Does need to be adjusted to 200 mg daily since there is an interaction with fluconazole.  5. Continue acyclovir fluconazole and Bactrim..    6. We will follow-up genetic Testing due to patient history of multiple malignancy and sister with breast cancer at early age.  Pretest counseling was provided..  7. Regarding poor appetite, they will be trying CBD oil..  8. Continue neutropenic precautions             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 (CMS/HCC)  - CBC & Differential  - CBC Auto Differential    Incontinence of  feces, unspecified fecal incontinence type  - Miscellaneous DME  - Ambulatory Referral to Home Health    Rash of perineum  - Ambulatory Referral to Home Health    Orders Placed This Encounter   Procedures   • Miscellaneous DME     Order Specific Question:   Type of DME     Answer:   adult diapers and cleaning wipes through [facility] - already established patient     Order Specific Question:   Length of Need (99 Months = Lifetime)     Answer:   99 Months = Lifetime   • CBC Auto Differential   • Ambulatory Referral to Home Health     Referral Priority:   Routine     Referral Type:   Home Health     Referral Reason:   Specialty Services Required     Requested Specialty:   Home Health Services     Number of Visits Requested:   1                 Thank you very much for providing the opportunity to participate in this patient’s care. Please do not hesitate to call if there are any other questions      I have reviewed all relevant labs results, imaging, vitals, medications and plan with the patient today.    Portions of the note have been Scribed by medical assistant/Nurse.  I have reviewed and made changes accordingly.

## 2019-09-10 NOTE — OUTREACH NOTE
Prep Survey      Responses   Facility patient discharged from?  [facility]   Is patient eligible?  Yes   Discharge diagnosis  neutropenic fever, thrombocytopenia, anemia, dehydration   Does the patient have one of the following disease processes/diagnoses(primary or secondary)?  Other   Does the patient have Home health ordered?  Yes   What is the Home health agency?   [facility]    Is there a DME ordered?  No   General alerts for this patient  on chemo   Prep survey completed?  Yes          Josee Lau RN

## 2019-09-10 NOTE — OUTREACH NOTE
Medical Week 1 Survey      Responses   Facility patient discharged from?  [facility]   Does the patient have one of the following disease processes/diagnoses(primary or secondary)?  Other   Is there a successful TCM telephone encounter documented?  No   Week 1 attempt successful?  Yes   Call start time  1506   Call end time  1509   Is patient permission given to speak with other caregiver?  Yes   List who call center can speak with  KRUPA FITZPATRICK   Person spoke with today (if not patient) and relationship  KRUPA FITZPATRICK- WIFE   Meds reviewed with patient/caregiver?  Yes   Is the patient having any side effects they believe may be caused by any medication additions or changes?  No   Does the patient have all medications ordered at discharge?  Yes   Is the patient taking all medications as directed (includes completed medication regime)?  Yes   Does the patient have a primary care provider?   Yes   Does the patient have an appointment with their PCP within 7 days of discharge?  No   What is preventing the patient from scheduling follow up appointments within 7 days of discharge?  Haven't had time   Nursing Interventions  Educated patient on importance of making appointment, Advised patient to make appointment [WIFE STATES SHE WILL MAKE PATIENT'S APPT WITH PCP]   What is the Home health agency?   [facility]    Has home health visited the patient within 72 hours of discharge?  Call prior to 72 hours   Home health comments  [facility] GOING TO SEE [place] THURSDAY 9/12/19   Did the patient receive a copy of their discharge instructions?  Yes   Nursing interventions  Reviewed instructions with patient   What is the patient's perception of their health status since discharge?  Improving   Is the patient/caregiver able to teach back signs and symptoms related to disease process for when to call PCP?  Yes   Is the patient/caregiver able to teach back signs and symptoms related to disease process for when to call 911?   Yes   Is the patient/caregiver able to teach back the hierarchy of who to call/visit for symptoms/problems? PCP, Specialist, Home health nurse, Urgent Care, ED, 911  Yes   Week 1 call completed?  Yes   Graduated  Yes   Did the patient feel the follow up calls were helpful during their recovery period?  Yes   Was the number of calls appropriate?  Yes          Holly Coronel LPN

## 2019-09-16 PROBLEM — Z51.11 ENCOUNTER FOR ANTINEOPLASTIC CHEMOTHERAPY: Status: ACTIVE | Noted: 2019-01-01

## 2019-09-16 PROBLEM — D70.9 NEUTROPENIA (HCC): Status: ACTIVE | Noted: 2019-01-01

## 2019-09-16 PROBLEM — R15.9 INCONTINENCE OF FECES: Status: ACTIVE | Noted: 2019-01-01

## 2019-09-16 PROBLEM — R21 RASH OF PERINEUM: Status: ACTIVE | Noted: 2019-01-01

## 2019-09-16 PROBLEM — D69.6 THROMBOCYTOPENIA (HCC): Status: ACTIVE | Noted: 2019-01-01

## 2019-09-20 NOTE — TELEPHONE ENCOUNTER
Received notification from [facility] that they are unable to fill Venetoclax for the pt due to insurance reasons so they have transferred the rx to [facility].

## 2019-09-23 PROBLEM — D61.810 PANCYTOPENIA DUE TO CHEMOTHERAPY (HCC): Status: ACTIVE | Noted: 2019-01-01

## 2019-09-23 PROBLEM — E87.6 HYPOKALEMIA: Status: ACTIVE | Noted: 2019-01-01

## 2019-09-23 NOTE — TELEPHONE ENCOUNTER
I notified Janiya of this and she is going to verbally give the order to the pharmacist at [facility].

## 2019-09-23 NOTE — PROGRESS NOTES
Received notification from Radha Dooley that [facility] pharmacist needed a verbal prescription to proceed with the [facility] that had [facility]  Spoke to pharmacist, Mamta with [facility], verbal prescription given, she will process the prescription.

## 2019-09-23 NOTE — TELEPHONE ENCOUNTER
Received message from Danita Pharmacist w/ [facility]. She states they received Venclexta script that cannot be used.  She states the way it was transferred it cannot be used.  Needs to be resubmitted.  They are requesting verbal order.  Phone number [phone].  lori Sanchez

## 2019-09-28 ENCOUNTER — INPATIENT HOSPITAL (OUTPATIENT)
Dept: URBAN - METROPOLITAN AREA HOSPITAL 84 | Facility: HOSPITAL | Age: 79
End: 2019-09-28
Payer: MEDICARE

## 2019-09-28 DIAGNOSIS — D61.818 OTHER PANCYTOPENIA: ICD-10-CM

## 2019-09-28 DIAGNOSIS — R19.7 DIARRHEA, UNSPECIFIED: ICD-10-CM

## 2019-09-28 PROCEDURE — 99222 1ST HOSP IP/OBS MODERATE 55: CPT | Performed by: NURSE PRACTITIONER

## 2019-09-29 PROCEDURE — 99232 SBSQ HOSP IP/OBS MODERATE 35: CPT | Performed by: NURSE PRACTITIONER

## 2019-10-01 NOTE — PROGRESS NOTES
"Adult Nutrition  Assessment/PES    Patient Name:  Rock Rock  YOB: 1940  MRN: [id number]  Admit Date:  9/23/2019    Assessment Date:  10/1/2019    Comments:  Continue Boost Plus BID and Ice Cream BID between meals to promote po intake. RDN will continue to monitor po intake for nutritional adequacy and treatment decisions.     Reason for Assessment     Row Name         Reason for Assessment    Reason For Assessment Follow-Up Assessment     MST 2 (9/26)       Identified At Risk by Screening Criteria  PMH: Anemia, Breast Cancer, CKD, Dyslipidemia     Current: acute myeloid leukemia (chemo treatments), pancytopenia, hypotension/weakness, persistent diarrhea (leikly r/t chemo), acute diverticulitis, THELMA, PT recommended in-patient rehab however pt refused, palliative consulting         Nutrition/Diet History     Row Name         Nutrition/Diet History    Typical Food/Fluid Intake  9/26: Pt stated that recently not able to eat d/t always having diarrhea. Could not confirm weight loss-pt could not tell me usual body weight. Did agree to ice cream and boost.      Food Allergies  No known food allergies.          Anthropometrics     Row Name         Anthropometrics    Height  188 cm (74.02\")     Weight  75.8 kg (167 lb)  10/1/19        Admit Weight    Admit Weight  75.8 kg (167 lb)  9/23/19        Ideal Body Weight (IBW)    Ideal Body Weight (IBW)  190 lb     % Ideal Body Weight  88%        Usual Body Weight (UBW)    Usual Body Weight  UTD      Weight Loss  9/26: unintentional weight down 12% x 1 month?     Weight History 199 lbs (8/2019)        Body Mass Index (BMI)    BMI (kg/m2)  22.51         Labs/Tests/Procedures/Meds     Row Name         Labs/Procedures/Meds    Lab Results Comments  Gluc 89. Na 131 L, K 3.6, BUN 16, Crt 1.9 H, Ca 7.0 L, Hgb 7.2 L, Hct 21.5 L        Medications    Pertinent Medications Comments  Lipitor, Diflucan, Synthroid, Vit B12         Physical Findings     Row Name         " "Physical Findings    Overall Physical Appearance 9/26: Appears to have muscle wasting at temple, NFPE completed 9/26/19; See MSA     Gastrointestinal  Continues with Diarrhea, x6 charted from 9/30-10/1     Oral/Mouth Cavity  no swallowing or chewing problems.      Skin  MSDA gluteal         Estimated/Assessed Needs     Row Name         Calculation Measurements    Height         Estimated/Assessed Needs    Additional Documentation         Calorie Requirements    Weight Used For Calorie Calculations      Estimated Calorie Need Method      Estimated Calorie Requirement Comment         KCAL/KG    KCAL/KG         Protein Requirements    Weight Used For Protein Calculations      Est Protein Requirement Amount (gms/kg)         Fluid Requirements    Estimated Fluid Requirements (mL/day)      Estimated Fluid Requirement Method          Nutrition Prescription Ordered     Row Name         Nutrition Prescription PO    Common Modifiers  Regular/Neutropenic          Evaluation of Received Nutrient/Fluid Intake     Row Name         PO Evaluation    % PO Intake 10/1: 25% x 4 meals recorded, pt got tearful during my visit and is asking if Hospice is considered \"giving up\", RDN encouraged pt to get some rest if able, palliative consulting    9/26: 2 breakfast doc 50% intake. This RD observed Bk on 9/26 & pt ordered only 2 OJ and 2 slices of toast. Stated he only wanted one slice of toast.         EN Evaluation    Number of Days EN Intake Evaluated      TF Changes      TF Residual      TF Tolerance            Malnutrition Severity Assessment     Row Name 09/26/19 0917          Malnutrition Severity Assessment    Malnutrition Type  Acute Disease or Injury - Related Malnutrition        Unintentional Weight Loss     Unintentional Weight Loss   Weight loss greater than 5% in one month        Muscle Loss    Loss of Muscle Mass Findings  Severe     Ev[place] Region  Severe - deep hollowing/scooping, lack of muscle to touch, facial bones well " defined     Clavicle Bone Region  Severe - protruding prominent bone     Acromion Bone Region  Moderate - acromion may slightly protrude     Scapular Bone Region  Severe - prominent bones, depressions easily visible between ribs, scapula, spine, shoulders     Dorsal Hand Region  Moderate - slight depression     Patellar Region  Severe - prominent bone, square looking, very little muscle definition     Anterior Thigh Region  Severe - line/depression along thigh, obviously thin     Posterior Calf Region  Moderate - some roundness, slight firmness        Fat Loss    Subcutaneous Fat Loss Findings  Severe     Orbital Region   Severe - pronounced hollowness/depression, dark circles, loose saggy skin     Upper Arm Region  Moderate - some fat tissue, not ample     Thoracic & Lumbar Region  Severe - ribs visible with prominent depressions, iliac crest very prominent        Criteria Met (Must meet criteria for severity in at least 2 of these categories: M Wasting, Fat Loss, Fluid, Secondary Signs, Wt. Status, Intake)    Patient meets criteria for   Severe Malnutrition           Problem/Interventions:  Problem 1     Row Name         Nutrition Diagnoses Problem 1    Problem 1  Severe acute malnutrition      Etiology (related to)  physiological causes (Acute myeloid leukemia/chemo/diarrhea)      Signs/Symptoms (evidenced by)  weight loss of 12% x 1 month, severe muscle wasting and severe fat loss.          Intervention Goal     Row Name         Intervention Goal    General  PO intake >50%         Nutrition Intervention     Row Name         Nutrition Intervention    RD/Tech Action  Continue     Recommended/Ordered  ONS         Nutrition Prescription     Row Name         Nutrition Prescription PO    PO Prescription  Regular/Neutropenic     Supplement  Boost Plus;Ice Cream     Supplement Frequency  2 times a day         Education/Evaluation     Row Name         Monitor/Evaluation    Monitor  PO intake;Supplement intake;Pertinent  labs/meds;Skin status;Weight;BMs           Electronically signed by:  Shelia Hoyt RD  10/01/19 2:01 PM

## 2019-10-01 NOTE — PROGRESS NOTES
"Hospitalist Team Progress Note      Patient Care Team:  Jovana Nur APRN as PCP - General (Family Medicine)      Chief complaint-generalized body weakness    Brief synopsis of the presenting complaints    79-year-old male with history of acute myeloid leukemia.  Was sent to the emergency room on 9/23/2019 from the [facility].  Due to generalized body weakness.    Blood work revealed Wbc was 0.9, hgb 8.8, plts 34. His bp was reportedly 85/60 at the [facility]. CXR showed no pneumonia. He was started on empiric IV antibiotic zosyn and 1 unit PRBCs ordered.   He was admitted for further treatment.     He has had some shortness of breath and diarrhea along with decreased oral intake.      Further PMH: HTN, RCC s/p L nephrectomy 2006 resulting in CKD stage 3, h/o breast CA s/p lumpectomy and axillary lymphnode dissection, skin cancers, hypothyroidism, hyperlipidemia          Subjective: Patient continues with diarrhea as stated that gluteal  area is quite painful from irritation   Hospice was consulted and after discussing with patient and family, they have decided to go home with hospice.  Plan is to discharge patient in the a.m. with hospice    ROS:  Review of Systems   Constitutional: Positive for fatigue. Negative for chills and fever.   HENT: Negative for congestion.    Gastrointestinal: Positive for diarrhea. Negative for abdominal pain, nausea and vomiting.   Neurological: Positive for weakness.   All other systems reviewed and are negative.        Objective:    Vital Signs  Temp:  [97.6 °F (36.4 °C)-99.4 °F (37.4 °C)] 99.4 °F (37.4 °C)  Heart Rate:  [] 60  Resp:  [15-18] 18  BP: (83-99)/(45-63) 84/45  Oxygen Therapy  SpO2: 98 %  Pulse Oximetry Type: Intermittent  Device (Oxygen Therapy): room air  Flow (L/min): 95  Flowsheet Rows      First Filed Value   Admission Height  188 cm (74\") Documented at 09/23/2019 1547   Admission Weight  75.8 kg (167 lb) Documented at 09/23/2019 1544        Intake & " Output (last 3 days)       09/28 0701 - 09/29 0700 09/29 0701 - 09/30 0700 09/30 0701 - 10/01 0700 10/01 0701 - 10/02 0700    P.O. 120       Blood        Total Intake(mL/kg) 120 (1.6)       Urine (mL/kg/hr) 400 (0.2)  750 (0.4)     Stool   0     Total Output 400  750     Net -280  -750             Urine Unmeasured Occurrence 1 x  3 x     Stool Unmeasured Occurrence 4 x 1 x 6 x         Lines, Drains & Airways    Active LDAs     None                Physical Exam:  General: well-developed and  NAD  HEENT: NC/AT, EOMI, PERRLA  Heart: RRR.   Chest: CTAB,   Abdominal: Soft. NT/ND. Bowel sounds present  Musculoskeletal: Normal ROM.    Neurological: AAOx3, no focal deficits  Skin: Skin is warm   psychiatric: Normal mood and affect.      Results Review:     I reviewed the patient's new clinical results.  CBC    Results from last 7 days   Lab Units 10/01/19  0400 09/30/19  0531 09/29/19  0313 09/28/19  0711 09/27/19 1736 09/27/19 0159 09/26/19 0227 09/25/19 2029 09/25/19  0404   WBC 10*3/mm3 0.80* 0.70* 0.80* 0.70*  --  0.70* 0.70*  --  0.70*   HEMOGLOBIN g/dL 7.2* 7.6* 9.4* 8.5* 9.5* 7.5* 7.8* 7.8* 7.3*   PLATELETS 10*3/mm3 40* 21* 19* 25*  --  25* 35* 47* 15*       CMP   Results from last 7 days   Lab Units 10/01/19  0400 09/30/19  0530 09/29/19  0314 09/27/19 1736 09/27/19 0159 09/26/19 0227 09/25/19 2029 09/25/19  0404   SODIUM mmol/L 131* 134* 133*  --  136 137  --  140   POTASSIUM mmol/L 3.6 3.7 4.4 3.9 3.4* 3.6 4.0 2.7*  2.7*   CHLORIDE mmol/L 97* 99* 96*  --  102 103  --  102   CO2 mmol/L 24.0 27.0 23.0  --  27.0 26.0  --  27.0   BUN mg/dL 16 13 11  --  11 16  --  21*   CREATININE mg/dL 1.90* 1.70* 1.50*  --  1.30* 1.40*  --  1.70*   GLUCOSE mg/dL 89 107* 82  --  98 107*  --  119*   ALBUMIN g/dL  --   --   --   --   --   --   --  1.60*   BILIRUBIN mg/dL  --   --   --   --   --   --   --  1.6*   ALK PHOS U/L  --   --   --   --   --   --   --  43   AST (SGOT) U/L  --   --   --   --   --   --   --  33   ALT  (SGPT) U/L  --   --   --   --   --   --   --  26     Cr Clearance Estimated Creatinine Clearance: 33.2 mL/min (A) (by C-G formula based on SCr of 1.9 mg/dL (H)).    Coag         HbA1C No results found for: HGBA1C  Blood Glucose       UA          Infection       ABG        Imaging:  No radiology results for the last day       Medication Review: I have reviewed the patient's current medication list    Scheduled Meds:    acyclovir 400 mg Oral BID   atenolol 12.5 mg Oral Daily   atorvastatin 40 mg Oral Daily   clotrimazole 10 mg Oral TID   fluconazole 200 mg Oral Daily   levothyroxine 75 mcg Oral Q AM   sodium chloride 10 mL Intravenous Q12H   sulfamethoxazole-trimethoprim 1 tablet Oral Daily   THERA 1 tablet Oral Daily   Venetoclax 100 mg Oral Daily   vitamin B-12 1,000 mcg Oral Daily     Continuous Infusions:    Pharmacy Consult - Pharmacy to dose      PRN Meds:.•  acetaminophen  •  bisacodyl  •  bisacodyl  •  diphenhydrAMINE  •  diphenoxylate-atropine  •  influenza vaccine  •  loperamide  •  magic butt paste  •  magnesium hydroxide  •  ondansetron **OR** ondansetron  •  Pharmacy Consult - Pharmacy to dose  •  potassium chloride **OR** potassium chloride **OR** potassium chloride  •  [COMPLETED] Insert peripheral IV **AND** sodium chloride  •  sodium chloride      Assessment/Plan     Hospital problem list:    Pancytopenia due to chemotherapy (CMS/HCC)    Anemia    Weakness    Hypokalemia      Assessment/Plan:    Acute myeloid leukemia  chemotherapy (Dacogen & Venetoclax) on hold until blood counts recover  Hematology/oncology is following    Pancytopenia due to chemotherapy (CMS/HCC)   no fever  On  neutropenic precautions   CXR no pneumonia  Initially on Zosyn empirically  Was seen by ID who recommended discontinuing Zosyn  On acyclovir for HSV prophylaxis  On  Mycelex, Diflucan for thrush  On  Bactrim for PCP prophylaxis  Monitor blood indices and transfuse as needed  Transfuse for hemoglobin <7.5  Transfuse for  platelets <15 K or if bleeding      Diarrhea  Probably chemotherapy-induced  Per ID-does not appear infectious  Symptomatic management  Has perianal rash--- wound care team following\  C. difficile screen: Negative 9/5/2019, &  9/26/2019  GI panel: Negative  Also seen by GI-CT abdomen/pelvis suggestive of diverticulitis  GI recommended conservative/noninvasive management due to pancytopenia     Weakness  secondary to all above   fall precautions     Hypokalemia - on replacement protocol        H/o RCC s/p RCC s/p L nephrectomy 2006 resulting in CKD stage 3  Continue to monitor renal function     H/o breast CA s/p lumpectomy and axillary lymphnode dissection, skin cancers     Hypothyroidism-on Synthroid       Hypertension-blood pressure fairly controlled  On atenolol       Hyperlipidemia-on statin       DVT prophylaxis - SCDs  anticoagulation contraindicated due to anemia and thrombocytopenia     Patient was seen by hospice and has decided to go hospice  To be discharged home with hospice tomorrow    Plan for disposition: Home with hospice in the a..    Von Nails MD  10/01/19  9:32 AM

## 2019-10-01 NOTE — CONSULTS
"Palliative Care Consultation    Patient Code Status    Code Status and Medical Interventions:   Ordered at: 09/23/19 2125     Level Of Support Discussed With:    Patient     Code Status:    CPR     Medical Interventions (Level of Support Prior to Arrest):    Full       Requesting clinician:  Consulting Physician(s)     Provider Relationship Specialty    Sergio Stevens MD Consulting Physician Hematology and Oncology    Brandy Blanca MD Consulting Physician Hospitalist        Reason for consult: Consultation for clarification of goals of care and code status.      Chief Complaint    Weakness  diarrhea    History of Present Illness    Rock Rock is a 79 y.o. male who presented from [facility] with increased weakness and persistent diarrhea. Sent to the ED on 9/23/19 with full workup noted. Patient Xray on admission showing pneumonia and received IV antibiotics. Pancytopenia persisted likely due to chemotherapy with WBC consistently <1.0   Oncology following with current receiving palliative chemotherapy for acute myeloid leukemia with treatment on hold due to pancytopenia.  CT abd / pelvis completed due to continued diarrhea with noted diverticulitis, GI consultation and conservative treatment. PRN lomotil and Imodium.  History of left nephrectomy 2006 due to cancer with chronic renal insufficiency. Creatinine 1.9 on admission, now 1.7  History of breast cancer with lumpectomy and lymph node.  Recommendations for rehab but noted per EMR that patient is refusing.  Noted that patient is a poor historian but during our conversation earlier this AM the patient was able to give me names and dates of activity that have occurred.  Question sundowner syndrome vs some dementia that worsens as the day goes on.  Patient repeats that he really only wants to go home. 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  States that his wife recently broke her arm and is not in the physical shape to help him at home but that his sister in law April previously arranged for him to stay with her and she arranged everything with [facility] but it 'fell through\" when he didn't discharge home.  Long discussion with the patient, no family present. Patient agreeable to family meeting with wife, son and sister in law for discussion regarding how to best care for the patient.  He is agreeable to hospice if the family is agreeable.  Will attempt to arrange family meeting for further discussion with wife, son and April sister in law        Advanced Care Planning    Advanced Directives: Patient has advance directive, copy requested  Health Care Directive on file: Other (Comment)  Health Care Surrogate:      Comments: spouse is POA    Assessment/Plan    Diarrhea likely secondary to chemotherapy  Generalized weakness without improvement  Diverticulitis per CT scan antibiotics  Chronic renal insuff with creatinine at baseline   Acute myeloid leukemia with palliative treatment per Oncology  Severe neutropenia  Left nephrectomy 2006 secondary to renal cancer  Breast cancer with previous mastectomy        Principal Problem:    Pancytopenia due to chemotherapy (CMS/HCC)  Active Problems:    Anemia    Weakness    Hypokalemia      Plan    Need family meeting with wife, son and sister in law for further discussion regarding going home with hospice.see  documentation.    Review of Systems   Constitutional: Positive for activity change, appetite change and fatigue.   HENT: Negative.    Eyes: Negative.    Respiratory: Negative.    Cardiovascular: Negative.    Gastrointestinal: Positive for abdominal distention, abdominal pain, diarrhea and rectal pain.   Endocrine: Negative.    Genitourinary: Negative.    Musculoskeletal: Negative.    Skin: Negative.    Allergic/Immunologic: Negative.    Neurological: Positive for weakness. "   Hematological: Negative.    Psychiatric/Behavioral: Positive for confusion.         Past Medical History:   Diagnosis Date   • Anemia    • Biclonal gammopathy     Diagnosed in 2011   • Breast cancer (CMS/HCC)     History of breast cancer in 1991 status post mastectomy and lymph node dissection   • Cancer of kidney (CMS/HCC)     Left kidney cancer status post nephrectomy 2006   • Chronic kidney disease (CKD), stage III (moderate) (CMS/HCC)    • Dyslipidemia    • History of skin cancer     History of multiple skin cancers in 1994   • Hypertension      Past Surgical History:   Procedure Laterality Date   • MASTECTOMY      Right mastectomy   • NEPHRECTOMY      Left nephrectomy     Family History   Problem Relation Age of Onset   • Cancer Sister      Social History     Tobacco Use   • Smoking status: Former Smoker   • Smokeless tobacco: Never Used   Substance Use Topics   • Alcohol use: No     Frequency: Never   • Drug use: No     Medications Prior to Admission   Medication Sig Dispense Refill Last Dose   • acetaminophen (TYLENOL) 500 MG tablet Take 500 mg by mouth Every 6 (Six) Hours As Needed for Mild Pain .      • acyclovir (ZOVIRAX) 400 MG tablet Take 400 mg by mouth 2 (Two) Times a Day.      • atenolol (TENORMIN) 25 MG tablet Take 12.5 mg by mouth Daily.      • atorvastatin (LIPITOR) 40 MG tablet Take 40 mg by mouth Daily.      • clotrimazole (MYCELEX) 10 MG kaitlyn Take 10 mg by mouth 3 (Three) Times a Day As Needed (thrush).      • Cyanocobalamin 500 MCG chewable tablet Chew 500 mcg Daily.      • fluconazole (DIFLUCAN) 200 MG tablet Take 1 tablet by mouth Daily for 26 doses. 30 tablet 0    • hydrALAZINE (APRESOLINE) 50 MG tablet Take 50 mg by mouth 3 (Three) Times a Day.      • levothyroxine (SYNTHROID, LEVOTHROID) 75 MCG tablet Take 75 mcg by mouth Daily.      • Multiple Vitamins-Minerals (MULTIVITAMIN WITH MINERALS) tablet tablet Take 1 tablet by mouth Daily.      • ondansetron ODT (ZOFRAN-ODT) 4 MG  disintegrating tablet Take 8 mg by mouth Every 8 (Eight) Hours As Needed for Nausea or Vomiting.      • sulfamethoxazole-trimethoprim (BACTRIM DS,SEPTRA DS) 800-160 MG per tablet Take 1 tablet by mouth Daily. 30 tablet 0    • vitamin D (ERGOCALCIFEROL) 69222 units capsule capsule Take 50,000 Units by mouth 1 (One) Time Per Week. Unknown what day of the week patient takes medication      • Venetoclax (VENCLEXTA) 100 MG tablet Take 1 tablet by mouth on day 1. Take 2 tablets by mouth on day 2. Take 4 tablets by mouth daily starting day 3. 120 tablet 0 Unknown at Unknown time       Allergies  Codeine    Scheduled Meds:    acetaminophen 650 mg Oral Once   Or      acetaminophen 650 mg Rectal Once   acyclovir 400 mg Oral BID   atenolol 12.5 mg Oral Daily   atorvastatin 40 mg Oral Daily   clotrimazole 10 mg Oral TID   diphenhydrAMINE 25 mg Oral Once   fluconazole 200 mg Oral Daily   levothyroxine 75 mcg Oral Q AM   sodium chloride 10 mL Intravenous Q12H   sulfamethoxazole-trimethoprim 1 tablet Oral Daily   THERA 1 tablet Oral Daily   Venetoclax 100 mg Oral Daily   vitamin B-12 1,000 mcg Oral Daily     Continuous Infusions:    Pharmacy Consult - Pharmacy to dose      PRN Meds:  acetaminophen  •  bisacodyl  •  bisacodyl  •  diphenhydrAMINE  •  diphenoxylate-atropine  •  influenza vaccine  •  loperamide  •  magic butt paste  •  magnesium hydroxide  •  ondansetron **OR** ondansetron  •  Pharmacy Consult - Pharmacy to dose  •  potassium chloride **OR** potassium chloride **OR** potassium chloride  •  [COMPLETED] Insert peripheral IV **AND** sodium chloride  •  sodium chloride    Lab Results (last 24 hours)     Procedure Component Value Units Date/Time    Manual Differential [[id number]]  (Abnormal) Collected:  10/01/19 0400    Specimen:  Blood Updated:  10/01/19 0546     Neutrophil % 9.0 %      Lymphocyte % 79.0 %      Monocyte % 12.0 %      Bands %  0.0 %      Neutrophils Absolute 0.07 10*3/mm3      Lymphocytes Absolute 0.63  10*3/mm3      Monocytes Absolute 0.10 10*3/mm3      nRBC 4.0 /100 WBC      RBC Morphology Normal     WBC Morphology Normal     Platelet Estimate Decreased    Narrative:       Reviewed by Pathologist within the past 30 days on 09/04/2019 and 08/30/2019 .    CBC & Differential [[id number]] Collected:  10/01/19 0400    Specimen:  Blood Updated:  10/01/19 0546    Narrative:       The following orders were created for panel order CBC & Differential.  Procedure                               Abnormality         Status                     ---------                               -----------         ------                     CBC Auto Differential[[id number]]        Abnormal            Final result                 Please view results for these tests on the individual orders.    CBC Auto Differential [[id number]]  (Abnormal) Collected:  10/01/19 0400    Specimen:  Blood Updated:  10/01/19 0546     WBC 0.80 10*3/mm3      RBC 2.36 10*6/mm3      Hemoglobin 7.2 g/dL      Hematocrit 21.5 %      MCV 91.0 fL      MCH 30.6 pg      MCHC 33.7 g/dL      RDW 15.6 %      RDW-SD 49.0 fl      MPV 9.3 fL      Platelets 40 10*3/mm3     Scan Slide [[id number]] Collected:  10/01/19 0400    Specimen:  Blood Updated:  10/01/19 0546     Scan Slide --     Comment: See Manual Differential Results       Basic Metabolic Panel [[id number]]  (Abnormal) Collected:  10/01/19 0400    Specimen:  Blood Updated:  10/01/19 0453     Glucose 89 mg/dL      BUN 16 mg/dL      Creatinine 1.90 mg/dL      Sodium 131 mmol/L      Potassium 3.6 mmol/L      Comment: Specimen hemolyzed.  Results may be affected.        Chloride 97 mmol/L      CO2 24.0 mmol/L      Calcium 7.0 mg/dL      eGFR Non African Amer 34 mL/min/1.73      BUN/Creatinine Ratio 8.4     Anion Gap 13.6 mmol/L     Narrative:       The MDRD GFR formula is only valid for adults with stable renal function between ages 18 and 70.              Palliative Assessment     Vital Signs (last 24 hours)       09/30 0700  -   10/01 0659 10/01 0700  -  10/01 0746   Most Recent    Temp (°F) 97.6 -  99.4       99.4 (37.4)    Heart Rate 60 -  108       60    Resp 15 -  18       18    BP (!)83/52 -  99/63       (!) 84/45  Comment: nurse notified.    SpO2 (%) 94 -  98       98        Physical Exam   Constitutional: He is oriented to person, place, and time. He appears well-developed and well-nourished.   Somewhat frail elderly male   HENT:   Head: Normocephalic and atraumatic.   Eyes: Pupils are equal, round, and reactive to light.   Neck: Normal range of motion.   Cardiovascular: Normal rate, regular rhythm and normal heart sounds.   Pulmonary/Chest: Effort normal and breath sounds normal.   Abdominal: Soft. There is tenderness.   Musculoskeletal:   Generalized weakness   Neurological: He is alert and oriented to person, place, and time.   Skin: Skin is warm and dry. Capillary refill takes less than 2 seconds.   Psychiatric: He has a normal mood and affect. His behavior is normal.   Some tearfullness with discussion of hospice   Vitals reviewed.        Decisional Capacity: yes at this time  Patient's understanding of illness: yes  Patient goals of care:  Ongoing discussion        ILENE Butler

## 2019-10-01 NOTE — PLAN OF CARE
Problem: Patient Care Overview  Goal: Plan of Care Review  Outcome: Ongoing (interventions implemented as appropriate)   10/01/19 0430   Coping/Psychosocial   Plan of Care Reviewed With patient   Plan of Care Review   Progress no change   OTHER   Outcome Summary Patient did not sleep well through the night. Continues to experience fatigue. Patient has had several episodes of fecal incontinence with loose watery stools. AM EKG showed Afib. NP notified.       Problem: Skin Injury Risk (Adult)  Goal: Identify Related Risk Factors and Signs and Symptoms  Outcome: Ongoing (interventions implemented as appropriate)   10/01/19 0430   Skin Injury Risk (Adult)   Related Risk Factors (Skin Injury Risk) advanced age;mobility impaired;moisture;nutritional deficiencies     Goal: Skin Health and Integrity  Outcome: Ongoing (interventions implemented as appropriate)   10/01/19 0430   Skin Injury Risk (Adult)   Skin Health and Integrity making progress toward outcome       Problem: Fall Risk (Adult)  Goal: Identify Related Risk Factors and Signs and Symptoms  Outcome: Ongoing (interventions implemented as appropriate)   10/01/19 0430   Fall Risk (Adult)   Related Risk Factors (Fall Risk) age-related changes;gait/mobility problems;environment unfamiliar   Signs and Symptoms (Fall Risk) presence of risk factors       Problem: Diarrhea (Adult)  Goal: Identify Related Risk Factors and Signs and Symptoms  Outcome: Ongoing (interventions implemented as appropriate)   10/01/19 0430   Diarrhea (Adult)   Related Risk Factors (Diarrhea) medication effects   Signs and Symptoms (Diarrhea) fecal incontinence;increased stool frequency, amount, and fluidity;loose, watery stool       Problem: Chemotherapy Effects (Adult)  Goal: Signs and Symptoms of Listed Potential Problems Will be Absent, Minimized or Managed (Chemotherapy Effects)   10/01/19 0430   Goal/Outcome Evaluation   Problems Assessed (Chemotherapy Effects) all   Problems Present  (Chemotherapy Effects) diarrhea;fatigue

## 2019-10-01 NOTE — PLAN OF CARE
Problem: Diarrhea (Adult)  Goal: Improved/Reduced Symptoms  Outcome: Ongoing (interventions implemented as appropriate)   10/01/19 1521   Diarrhea (Adult)   Improved/Reduced Symptoms making progress toward outcome

## 2019-10-01 NOTE — PROGRESS NOTES
Continued Stay Note  MEGHNA Matamoros     Patient Name: Rock Rock  MRN: [id number]  Today's Date: 10/1/2019    Admit Date: 9/23/2019    Discharge Plan     Row Name 10/01/19 1227       Plan    Plan  Anticipate home to Sister-in-law April Kishan with [facility] pending palliative consult with possible family meeting to discuss goals of care.     Patient/Family in Agreement with Plan  yes    Plan Comments  Chart review,  multidisciplinary rounds and discussion with ILENE Contreras - current plan to is assemble a family meeting to discuss goals of care. Barriers include hgb of 7.2 today with plan for blood transfusion and continued monitoring.          Elva Solorio  [phone]

## 2019-10-01 NOTE — PROGRESS NOTES
Continued Stay Note  MEGHNA Matamoros     Patient Name: Rock Rock  MRN: [id number]  Today's Date: 10/1/2019    Admit Date: 9/23/2019    Discharge Plan    Home with [facility]       Shannon spoke to Mamta with [facility] and she reports speaking with family and they are all agreeable to taking pt home with hospice.[facility] will order equipment needed for home 10/1 so everything will be ready when pt is dc.          Rosario Arias, [facility], LSW  [phone]

## 2019-10-01 NOTE — SIGNIFICANT NOTE
Full Code / Hospice Referral    Palliative care team met with pt to assess for goals of care.  I met with pt immediately after he discussed his prognosis with oncologist.  Pt reports he is in agreement with referring hospice, and that his ultimate goal is to be able to return to home.  Pt's wife had expressed that she was not strong enough to care for pt at home and pt had expressed that his sister-in-law would possibly care for him in her home.  I called pt's wife and sister-in-law, but was unable to make contact. See NP and care coordination documentation for further.  At time of this writing [facility] has been referred to coordinate pt's hospice care at home.  Emotional support provided.  Will defer palliative services to hospice team.       10/01/19 1600   PC Encounter Information   Palliative Care Patient? yes   Referral Date 09/30/19   Referral Time 1402   Date of Initial Encounter with a Palliative Care Provider 10/01/19   Time of initial encounter with a Palliative Care Provider 0815   Time Required for Initial Referral 15 mins   Additional Visit/Time Required to Achieve Results 15 mins   Patient Unit at Time of Referral 3C   Patient Unit Specialty at Time of Referral medical surgical   Primary Diagnosis Leading to PC Consult cancer (solid tumor)   Code Status at Time of Consult full   Palliative Care Team Members Involved in Consultation nurse practitioner;   Screening Status/Interventions   Psychosocial Needs pos   Psychosocial Needs Intervened yes   Spiritual Needs unable   Goals of Care/ACP pos   Goals of Care/ACP Intervened yes   Palliative Care Acuity   Psychosocial Acuity 1   Health Care Directives/Treatment Preferences   Advance Directive Document on Chart at Time of Consult yes   Pre-existing AND/MOST/POLST Order No   Advance Directive Status Patient has advance directive, copy requested   Type of Advance Directive Health care directive/Living will   Surrogate Decision Maker not  addressed   Goals of Care/Treatment Preferences (initial assessment and clinical changes) Completed   Treatment Preferences comfort measures   Code Status Discussed yes   POLST/MOST Initiated no   Outcomes   Code Status After Consult full   Number of Family Meetings 1

## 2019-10-02 PROBLEM — C92.00 ACUTE MYELOID LEUKEMIA IN ADULT (HCC): Chronic | Status: ACTIVE | Noted: 2019-01-01

## 2019-10-02 PROBLEM — D61.810 PANCYTOPENIA DUE TO CHEMOTHERAPY (HCC): Chronic | Status: ACTIVE | Noted: 2019-01-01

## 2019-10-02 NOTE — PLAN OF CARE
Problem: Patient Care Overview  Goal: Plan of Care Review   10/02/19 1111   OTHER   Outcome Summary Pt voices fatigue/ weakness from bed- chair transfer & short bout of sitting up in chair earlier this date with other staff. Pt was agreeable to raising head of bed for completion of self care tasks. Per chart, medical POC is home with hospice care. Pt states he has good family support, a hospital bed and BSC for a safe d/c. Will continue to follow.

## 2019-10-02 NOTE — PROGRESS NOTES
Discharge Planning Assessment   [place]     Patient Name: Rock Rock  MRN: [id number]  Today's Date: 10/2/2019    Admit Date: 9/23/2019       Plan    Plan  confirmed with bhavin with [facility] that he is able to discharge today with their services    Patient/Family in Agreement with Plan  yes       Carol naegele rn  Case management  Office number [phone]  Cell phone [phone]

## 2019-10-02 NOTE — PROGRESS NOTES
Hematology/Oncology Inpatient Progress Note    PATIENT NAME: Rock Rock  : 1940  MRN: [id number]    CHIEF COMPLAINT: Weakness    HISTORY OF PRESENT ILLNESS: 79 y.o. male patient presented to the ER on 2019 for complaints of weakness. The patient also complained of shortness of breath and diarrhea and decreased oral intake.  In the ER the patient was hypotensive.  Labs showed a WBC of 0.9, hemoglobin 8.8, platelets 34,000.  The patient was initiated on IV Zosyn, fluids and received 1 unit of packed red blood cells for his symptomatic anemia.  Chest x-ray in the ER showed no active pulmonary disease.  EKG showed sinus tachycardia with occasional PAC.     19  Hematology/Oncology was consulted on patient known to us and last seen in the office by Dr. Stevens on 2019 in follow-up for his diagnosis of acute myeloid leukemia.  The patient was initially referred by his primary care provider for abnormal CBC.  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  The patient was initiated on Dacogen 20 mg/m² IV x10 days on 2019 and received cycle 1 until 2019.  He was hospitalized from 2019 to 2019 with diarrhea, weakness, neutropenic fever.  C. difficile testing was negative.  He is on prophylactic Bactrim, fluconazole, acyclovir.     PCP: Jovana Nur APRN    Subjective  Patient going home now with [facility].  Patient continues to have significant diarrhea . without much improvement.  Continues to have perianal pain in the burning due to ulceration.      ROS:  Review of Systems   Constitutional: Positive for appetite change and fatigue. Negative for chills and fever.   HENT: Negative for ear pain, mouth sores, nosebleeds and sore throat.    Eyes: Negative for photophobia and visual disturbance.   Respiratory: Negative for wheezing and stridor.    Cardiovascular: Negative for chest pain and palpitations.   Gastrointestinal: Positive for  "diarrhea and nausea. Negative for abdominal pain and vomiting.   Endocrine: Negative for cold intolerance and heat intolerance.   Genitourinary: Negative for dysuria and hematuria.   Musculoskeletal: Negative for joint swelling and neck stiffness.   Skin: Positive for rash. Negative for color change.   Neurological: Positive for weakness. Negative for seizures and syncope.   Hematological: Negative for adenopathy.        No obvious bleeding   Psychiatric/Behavioral: Negative for agitation, confusion and hallucinations. The patient is nervous/anxious.         MEDICATIONS:    Scheduled Meds:       Continuous Infusions:      No current facility-administered medications for this encounter.    PRN Meds:       ALLERGIES:    Allergies   Allergen Reactions   • Codeine Itching       Objective    VITALS:   /68 (BP Location: Left arm, Patient Position: Lying)   Pulse 68   Temp 97.8 °F (36.6 °C) (Oral)   Resp 16   Ht 188 cm (74.02\")   Wt 76.4 kg (168 lb 6.9 oz)   SpO2 99%   BMI 21.62 kg/m²     PHYSICAL EXAM:  Physical Exam   Constitutional: He is oriented to person, place, and time. No distress.   Frail-appearing   HENT:   Head: Normocephalic and atraumatic.   Edentulous   Eyes: Conjunctivae and EOM are normal. Right eye exhibits no discharge. Left eye exhibits no discharge. No scleral icterus.   Eyeglasses present   Neck: Normal range of motion. Neck supple. No thyromegaly present.   Cardiovascular: Normal rate, regular rhythm and normal heart sounds. Exam reveals no gallop and no friction rub.   Monitor leads   Pulmonary/Chest: Effort normal. No stridor. No respiratory distress. He has no wheezes.   Abdominal: Soft. He exhibits no mass. There is no tenderness. There is no rebound and no guarding.   Musculoskeletal: Normal range of motion. He exhibits no tenderness.   LUE IV   Lymphadenopathy:     He has no cervical adenopathy.   Neurological: He is alert and oriented to person, place, and time. He exhibits normal " muscle tone.   Skin: Skin is warm. No rash noted. He is not diaphoretic. No erythema.   Scattered ecchymoses   Psychiatric: He has a normal mood and affect. His behavior is normal.   Nursing note and vitals reviewed.        RECENT LABS:  Lab Results (last 24 hours)     Procedure Component Value Units Date/Time    CBC & Differential [[id number]] Collected:  10/02/19 0357    Specimen:  Blood Updated:  10/02/19 0542    Narrative:       The following orders were created for panel order CBC & Differential.  Procedure                               Abnormality         Status                     ---------                               -----------         ------                     CBC Auto Differential[[id number]]        Abnormal            Final result                 Please view results for these tests on the individual orders.    CBC Auto Differential [[id number]]  (Abnormal) Collected:  10/02/19 0357    Specimen:  Blood Updated:  10/02/19 0542     WBC 0.80 10*3/mm3      RBC 2.72 10*6/mm3      Hemoglobin 8.6 g/dL      Hematocrit 24.5 %      MCV 90.2 fL      MCH 31.6 pg      MCHC 35.0 g/dL      RDW 15.1 %      RDW-SD 47.3 fl      MPV 8.4 fL      Platelets 39 10*3/mm3     Narrative:       Differential not indicated due to low WBC.    Basic Metabolic Panel [[id number]]  (Abnormal) Collected:  10/02/19 0357    Specimen:  Blood Updated:  10/02/19 0537     Glucose 102 mg/dL      BUN 16 mg/dL      Creatinine 1.80 mg/dL      Sodium 132 mmol/L      Potassium 3.2 mmol/L      Chloride 96 mmol/L      CO2 24.0 mmol/L      Calcium 7.2 mg/dL      eGFR Non African Amer 36 mL/min/1.73      BUN/Creatinine Ratio 8.9     Anion Gap 15.2 mmol/L     Narrative:       The MDRD GFR formula is only valid for adults with stable renal function between ages 18 and 70.          PENDING RESULTS: none    IMAGING REVIEWED:  No radiology results for the last day    I have reviewed the patient's labs, imaging, reports, and other clinician  documentation.    Assessment/Plan   ASSESSMENT:  1. Acute myeloid leukemia.  Approximately 60% CD13+, CD 33+ moderate, CD34 negative, CD38 positive,  positive + marrow.  Cytogenetics consistent with trisomy 21.  Molecular testing by NGS showed DNMT3A alteration positive, IDH 2 alteration positive, in the p.m. of 1+, low level FLT 3 ITD.  Not a candidate for high intensity therapy nor transplant.  Has been initiated on palliative therapy with Dacogen.  Received cycle 1 of Dacogen on 8/20/2019 - 9/4/2019.  2. Pancytopenia.  Secondary to disease and medication effect.  Has been on prophylactic acyclovir, fluconazole and Bactrim.  Currently on neutropenic precautions.  Has been afebrile.  · 1 unit of packed red blood cells 9/23/2019, 9/25/2019, 9/27/2019, and 10/1/2019  · 1 unit of platelets 9/25/2019  3. History of kidney cancer/right breast cancer/melanoma.  Genetic testing has been performed with results pending.  4. History of biclonal gammopathy.  IgA kappa monoclonal band and M-protein is 0.8, no excess plasma cells were reported in bone marrow.  5. Hypotension/weakness.  Antihypertensives are on hold.    Resolved.  6. Persistent diarrhea.  Possible side effect from Dacogen.  Recent C. difficile negative.  Add Lomotil as needed.  Patient has also developed perianal rash due to diarrhea and has Jessica's Magic Butt paste.  Infectious disease consulted for persistent diarrhea.  While cannot rule out toxicity from Dacogen it is not common to see this level of diarrhea with the drug.  Improving.  We will also ask wound care to see patient for his perianal rash.  Repeat C. difficile negative, GI panel negative.  KUB showed possible gastroenteritis or ileus.  Seen by GI with plans to manage noninvasively due to his pancytopenia.  Possible flexible sigmoidoscopy in the future if diarrhea recurs.  7.  Acute diverticulitis.  On IV Zosyn.  8. THELMA/electrolyte abnormalities.  Managed per primary  team.  9. Hypertension/hypothyroidism/hyperlipidemia.  Chronic disease management per primary team.  10. DVT prophylaxis.  SCDs.  11. Palliative care consulted: family is agreeable to taking pt home with [facility]     PLAN:  1. Monitor CBC daily.    2. Transfuse for hemoglobin 7.5 or below  3. Transfuse platelets 15,000 or below or signs of bleeding.  4. Continue antibiotics and supportive care  5. Continue Lomotil for persistent diarrhea not responding to Imodium  6. Discharge home with [facility]     Electronically signed by ILENE Watts, 10/02/19, 12:55 PM.        I discussed the patients findings and my recommendations with patient.    Patient seen and examined.  I personally reviewed all pertinent labs and the  imaging.  I agree with  nurse practitioner's assessment and plan.  Again discussed with patient about the prognosis and the importance of quality of life.  He has not shown much improvement after hospitalization.  Continues to have diarrhea.  Continues to have very poor performance status ECOG PS 2-3.  He will go with hospice which seems appropriate at this time.  He was given option of coming back to office if he really turns around and feels better.    Sergio Stevens MD  10/02/19  11:25 PM    Much of the above report is an electronic transcription/translation of the spoken language to printed text using Dragon Software. As such, the subtleties and finesse of the spoken language may permit erroneous, or at times, nonsensical words or phrases to be inadvertently transcribed; thus changes may be made at a later date to rectify these errors.

## 2019-10-02 NOTE — THERAPY TREATMENT NOTE
Acute Care - Occupational Therapy Treatment Note  MEGHNA Matamoros     Patient Name: Rock Rock  : 1940  MRN: [id number]  Today's Date: 10/2/2019             Admit Date: 2019       ICD-10-CM ICD-9-CM   1. 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 Hypokalemia E87.6 276.8   3. Myeloid leukemia, not having achieved remission, unspecified myeloid leukemia type (CMS/HCC) C92.90 205.90   4. Pancytopenia due to chemotherapy (CMS/HCC) D61.810 284.11   5. Weakness R53.1 780.79   6. 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)   • Elevated serum creatinine   • Weakness   • Oral thrush   • Oral herpes   • Dehydration   • Diarrhea   • Rash of perineum   • Incontinence of feces   • Encounter for antineoplastic chemotherapy   • Neutropenia (CMS/HCC)   • Thrombocytopenia (CMS/HCC)   • Pancytopenia due to chemotherapy (CMS/HCC)   • Hypokalemia     Past Medical History:   Diagnosis Date   • Anemia    • Biclonal gammopathy     Diagnosed in    • Breast cancer (CMS/HCC)     History of breast cancer in  status post mastectomy and lymph node dissection   • Cancer of kidney (CMS/HCC)     Left kidney cancer status post nephrectomy    • Chronic kidney disease (CKD), stage III (moderate) (CMS/HCC)    • Dyslipidemia    • History of skin cancer     History of multiple skin cancers in    • Hypertension      Past Surgical History:   Procedure Laterality Date   • MASTECTOMY      Right mastectomy   • NEPHRECTOMY      Left nephrectomy       Therapy Treatment    Rehabilitation Treatment Summary     Row Name 10/02/19 1107             Treatment Time/Intention    Discipline  occupational therapist  -HUMPHREY      Document Type  therapy note (daily note)  -HUMPHREY       Subjective Information  complains of;weakness  -HUMPHREY      Recorded by [HUMPHREY] Leigh Ingram, OT 10/02/19 1110      Row Name 10/02/19 1107             ADL Assessment/Intervention    BADL Assessment/Intervention  bathing;feeding;grooming  -      Recorded by [HUMPHREY] Leigh Ingram, OT 10/02/19 1110      Row Name 10/02/19 1107             Bathing Assessment/Intervention    Bathing [place] Level  upper body;supervision;set up  -HUMPHREY      Bathing Position  sitting up in bed  -      Recorded by [HUMPHREY] Leigh Ingram OT 10/02/19 1110      Row Name 10/02/19 1107             Grooming Assessment/Training    [place] Level (Grooming)  hair care, combing/brushing;supervision  -      Grooming Position  sitting up in bed  -      Recorded by [HUMPHREY] Leigh Ingram OT 10/02/19 1110      Row Name 10/02/19 1107             Self-Feeding Assessment/Training    [place] Level (Feeding)  liquids to mouth;supervision  -      Self-Feeding Assess/Train, Spillage Amount  minimal  -      Position (Self-Feeding)  sitting up in bed  -      Recorded by [HUMPHREY] Leigh Ingram, OT 10/02/19 1110      Row Name 10/02/19 1107             BADL Safety/Performance    Impairments, BADL Safety/Performance  endurance/activity tolerance;pain;strength  -      Recorded by [HUMPHREY] Leigh Ingram OT 10/02/19 1110      Row Name 10/02/19 1107             Positioning and Restraints    Pre-Treatment Position  in bed  -      Post Treatment Position  bed  -      In Bed  side lying left;encouraged to call for assist;call light within reach  -HUMPHREY      Recorded by [HUMPHREY] Leigh Ingram OT 10/02/19 1110      Row Name 10/02/19 1107             Pain Assessment    Additional Documentation  -- stomach and buttocks pain from diarrhea  -      Recorded by [HUMPHREY] Leigh Ingram OT 10/02/19 1110      Row Name                Wound 09/23/19 2302 Bilateral midline gluteal MASD (Moisutre associated skin damage)    Wound -  Properties Group Date first assessed: 09/23/19 [RR] Time first assessed: 2302 [RR] Present on Hospital Admission: Y [RR] Side: Bilateral [RR] Orientation: midline [RR] Location: gluteal [RR] Primary Wound Type: MASD [RR] Recorded by:  [RR] Fredy Sena RN 09/24/19 0449    Row Name 10/02/19 1107             Outcome Summary/Treatment Plan (OT)    Daily Summary of Progress (OT)  progress towards functional goals is fair  -HUMPHREY      Barriers to Overall Progress (OT)  medical status   -HUMPHREY      Anticipated Discharge Disposition (OT)  home with home health;home with 24/7 care  -HUMPHREY      Patient/Family Concerns, Anticipated Discharge Disposition (OT)  caregiver burden  -HUMPHREY      Recorded by [HUMPHREY] Leigh Ingram OT 10/02/19 1110        User Key  (r) = Recorded By, (t) = Taken By, (c) = Cosigned By    Initials Name Effective Dates Discipline    RR Fredy Sena RN 03/01/19 -  Nurse    Leigh Keys, OT 07/25/19 -  OT        Wound 09/23/19 2302 Bilateral midline gluteal MASD (Moisutre associated skin damage) (Active)   Dressing Appearance open to air 10/2/2019  8:00 AM   Closure Open to air 10/2/2019  8:00 AM   Base red 10/2/2019  8:00 AM   Periwound excoriated 10/2/2019  8:00 AM   Drainage Amount none 10/2/2019  8:00 AM   Care, Wound barrier applied;cleansed with 10/1/2019  7:01 PM   Dressing Care, Wound open to air 10/2/2019  8:00 AM   Periwound Care, Wound barrier ointment applied 10/2/2019  8:00 AM       Occupational Therapy Education     Title: PT OT SLP Therapies (Done)     Topic: Occupational Therapy (Done)     Point: ADL training (Done)     Description: Instruct learner(s) on proper safety adaptation and remediation techniques during self care or transfers.   Instruct in proper use of assistive devices.    Learning Progress Summary           Patient Acceptance, E,TB, VU by HUMPHREY at 10/2/2019 11:10 AM    Acceptance, E,TB, VU,NR by MS at 9/30/2019  2:09 PM    Acceptance, E,TB, VU,NR by FRANK at 9/28/2019  5:45  PM    Acceptance, E, VU by SOFIA at 9/28/2019 10:42 AM    Acceptance, E, VU by VB at 9/27/2019  9:35 AM    Acceptance, E,D, NR by  at 9/26/2019  3:43 PM    Comment:  Reinforced safe hand placement c rwx. use.    Acceptance, E,TB, VU by SS at 9/26/2019  4:29 AM    Acceptance, E,D, VU,DU,NR by  at 9/25/2019  4:01 PM    Comment:  Needs reinforced safety c rwx. and hand placement during transfers.    Acceptance, E, VU by NATHANIEL at 9/25/2019  3:47 PM    Acceptance, E, VU by PRISCILLA at 9/25/2019  2:32 AM                   Point: Home exercise program (Done)     Description: Instruct learner(s) on appropriate technique for monitoring, assisting and/or progressing therapeutic exercises/activities.    Learning Progress Summary           Patient Acceptance, E,TB, VU by HUMPHREY at 10/2/2019 11:10 AM    Acceptance, E,TB, VU,NR by MS at 9/30/2019  2:09 PM    Acceptance, E,TB, VU,NR by FRANK at 9/28/2019  5:45 PM    Acceptance, E, VU by SOFIA at 9/28/2019 10:42 AM    Acceptance, E, VU by NATHANIEL at 9/27/2019  9:35 AM    Acceptance, E,D, NR by  at 9/26/2019  3:43 PM    Comment:  Reinforced safe hand placement c rwx. use.    Acceptance, E,TB, VU by MATT at 9/26/2019  1:13 PM    Acceptance, E,TB, VU by  at 9/26/2019  4:29 AM    Acceptance, E,D, VU,DU,NR by  at 9/25/2019  4:01 PM    Comment:  Needs reinforced safety c rwx. and hand placement during transfers.    Acceptance, E, VU by NATHANIEL at 9/25/2019  3:47 PM    Acceptance, E, VU by PRISCILLA at 9/25/2019  2:32 AM                   Point: Precautions (Done)     Description: Instruct learner(s) on prescribed precautions during self-care and functional transfers.    Learning Progress Summary           Patient Acceptance, E,TB, VU by HUMPHREY at 10/2/2019 11:10 AM    Acceptance, E,TB, VU,NR by MS at 9/30/2019  2:09 PM    Acceptance, E,TB, VU,NR by FRANK at 9/28/2019  5:45 PM    Acceptance, PRISCILLA, VU by SOFIA at 9/28/2019 10:42 AM    Acceptance, NAZARIO WELLS by NATHANIEL at 9/27/2019  9:35 AM    Acceptance, CAROLINE WELLS, NR by  at 9/26/2019  3:43 PM     Comment:  Reinforced safe hand placement c rwx. use.    Acceptance, E,TB, VU by  at 9/26/2019  4:29 AM    Acceptance, E,D, VU,DU,NR by  at 9/25/2019  4:01 PM    Comment:  Needs reinforced safety c rwx. and hand placement during transfers.    Acceptance, E, VU by VB at 9/25/2019  3:47 PM    Acceptance, E, VU by  at 9/25/2019  2:32 AM                   Point: Body mechanics (Done)     Description: Instruct learner(s) on proper positioning and spine alignment during self-care, functional mobility activities and/or exercises.    Learning Progress Summary           Patient Acceptance, E,TB, VU by HUMPHREY at 10/2/2019 11:10 AM    Acceptance, E,TB, VU,NR by MS at 9/30/2019  2:09 PM    Acceptance, E,TB, VU,NR by  at 9/28/2019  5:45 PM    Acceptance, E, VU by  at 9/28/2019 10:42 AM    Acceptance, E, VU by  at 9/27/2019  9:35 AM    Acceptance, E,D, NR by  at 9/26/2019  3:43 PM    Comment:  Reinforced safe hand placement c rwx. use.    Acceptance, E,TB, VU by  at 9/26/2019  4:29 AM    Acceptance, E,D, VU,DU,NR by  at 9/25/2019  4:01 PM    Comment:  Needs reinforced safety c rwx. and hand placement during transfers.    Acceptance, E, VU by  at 9/25/2019  3:47 PM    Acceptance, E, VU by  at 9/25/2019  2:32 AM    Acceptance, E,TB, VU by  at 9/24/2019  2:44 PM                               User Key     Initials Effective Dates Name Provider Type Discipline    MP 03/01/19 -  Hima Luna OT Occupational Therapist OT     03/01/19 -  Natalie Lopez PTA Physical Therapy Assistant PT     03/01/19 -  Cookie Guzman PTA Physical Therapy Assistant PT     03/01/19 -  Isis Bland RN Registered Nurse Nurse     03/01/19 -  Goyo Edmond, RN Registered Nurse Nurse    MS 09/26/19 -  Laisha Gant RN Registered Nurse Nurse     06/24/19 -  Josefa Holley LPN Licensed Nurse Nurse    VB 07/19/19 -  Franca Bella RN Registered Nurse Nurse    HUMPHREY 07/25/19 -  Leigh Ingram OT Occupational  Therapist OT                OT Recommendation and Plan  Outcome Summary/Treatment Plan (OT)  Daily Summary of Progress (OT): progress towards functional goals is fair  Barriers to Overall Progress (OT): medical status   Anticipated Discharge Disposition (OT): home with home health, home with 24/7 care  Patient/Family Concerns, Anticipated Discharge Disposition (OT): caregiver burden  Daily Summary of Progress (OT): progress towards functional goals is fair  Outcome Summary: Pt voices fatigue/ weakness from bed- chair transfer & short bout of sitting up in chair earlier this date with other staff. Pt was agreeable to raising head of bed for completion of self care tasks. Per chart, medical POC is home with hospice care. Pt states he has good family support, a hospital bed and BSC for a safe d/c. Will continue to follow.        Time Calculation:   Time Calculation- OT     Row Name 10/02/19 1114             Time Calculation- OT    OT Start Time  1048  -      OT Stop Time  1104  -      OT Time Calculation (min)  16 min  -      Total Timed Code Minutes- OT  16 minute(s)  -HUMPHREY      OT Received On  10/02/19  -HUMPHREY      OT - Next Appointment  10/07/19  -HUMPHREY      OT Goal Re-Cert Due Date  10/16/19  -HUMPHREY        User Key  (r) = Recorded By, (t) = Taken By, (c) = Cosigned By    Initials Name Provider Type    Leigh Keys OT Occupational Therapist        Therapy Charges for Today     Code Description Service Date Service Provider Modifiers Qty    [id number]  OT SELF CARE/MGMT/TRAIN EA 15 MIN 10/2/2019 Leigh Ingram OT GO 1               Leigh Ingram OT  10/2/2019

## 2019-10-02 NOTE — PLAN OF CARE
Problem: Patient Care Overview  Goal: Plan of Care Review  Outcome: Ongoing (interventions implemented as appropriate)   10/02/19 0502   Coping/Psychosocial   Plan of Care Reviewed With patient   Plan of Care Review   Progress no change   OTHER   Outcome Summary Patient was restless through the night. Fatigue and fecal incontinence still present.       Problem: Skin Injury Risk (Adult)  Goal: Identify Related Risk Factors and Signs and Symptoms  Outcome: Ongoing (interventions implemented as appropriate)   10/02/19 0502   Skin Injury Risk (Adult)   Related Risk Factors (Skin Injury Risk) advanced age;mobility impaired;moisture;nutritional deficiencies     Goal: Skin Health and Integrity  Outcome: Ongoing (interventions implemented as appropriate)   10/02/19 0502   Skin Injury Risk (Adult)   Skin Health and Integrity making progress toward outcome       Problem: Fall Risk (Adult)  Goal: Identify Related Risk Factors and Signs and Symptoms  Outcome: Ongoing (interventions implemented as appropriate)   10/02/19 0502   Fall Risk (Adult)   Related Risk Factors (Fall Risk) age-related changes;gait/mobility problems;environment unfamiliar   Signs and Symptoms (Fall Risk) presence of risk factors       Problem: Diarrhea (Adult)  Goal: Identify Related Risk Factors and Signs and Symptoms  Outcome: Ongoing (interventions implemented as appropriate)   10/02/19 0502   Diarrhea (Adult)   Related Risk Factors (Diarrhea) medication effects   Signs and Symptoms (Diarrhea) fecal incontinence;increased stool frequency, amount, and fluidity;loose, watery stool     Goal: Improved/Reduced Symptoms  Outcome: Ongoing (interventions implemented as appropriate)   10/02/19 0502   Diarrhea (Adult)   Improved/Reduced Symptoms making progress toward outcome       Problem: Chemotherapy Effects (Adult)  Goal: Signs and Symptoms of Listed Potential Problems Will be Absent, Minimized or Managed (Chemotherapy Effects)   10/02/19 0502   Goal/Outcome  Evaluation   Problems Assessed (Chemotherapy Effects) all   Problems Present (Chemotherapy Effects) fatigue;diarrhea

## 2019-10-02 NOTE — DISCHARGE SUMMARY
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          Hospital Course  This is an 79-year-old male with a history of acute myeloid leukemia who was sent to the emergency room on 09/23/2019 from the [facility] due to generalized body weakness. Workup revealed WBC 0.9, hgb 8.8, and plts 34. His blood pressure was reportedly 85/60 at the [facility]. CXR showed no pneumonia. He was started on empiric IV antibiotic Zosyn and 1 unit PRBCs ordered. He was admitted for further treatment.     Oncology was consulted. The patient's chemotherapy was put on hold until blood counts recover. The patient was transfused with 1 unit of packed red blood cells on 09/23, 09/25, 09/27, and 10/01/19. He was transfused with 1 unit of platelets on 09/25/19.     Infectious disease was consulted and recommended discontinuing Zosyn. The patient is on acyclovir for HSV prophylaxis, Mycelex and Diflucan for thrush, and Bactrim for PCP prophylaxis.     The patient was also complaining of diarrhea. Suspect chemotherapy-induced. Per ID, it does not appear infectious. Symptomatic management was recommended. The patient has a perianal rash. His C. difficile screen was negative on 09/05/19 and 09/26/19. His GI panel was negative. Gastroenterology was consulted as his CT abdomen/pelvis was suggestive of diverticulitis. GI  recommended conservative/noninvasive management due to pancytopenia.     [facility] was consulted. On 10/01 the patient and family decided to go home with hospice. The patient will be discharged home today (10/02/2019).       Past Medical History:     Past Medical History:   Diagnosis Date   • Anemia    • Biclonal gammopathy     Diagnosed in 2011   • Breast cancer (CMS/HCC)     History of breast cancer in 1991 status post mastectomy and lymph node dissection   • Cancer of kidney (CMS/HCC)     Left kidney cancer status post nephrectomy 2006   • Chronic kidney disease (CKD), stage III (moderate) (CMS/HCC)    • Dyslipidemia    • History of skin cancer     History of multiple skin cancers in 1994   • Hypertension        Past Surgical History:     Past Surgical History:   Procedure Laterality Date   • MASTECTOMY      Right mastectomy   • NEPHRECTOMY      Left nephrectomy       Social History:   Social History     Socioeconomic History   • Marital status:      Spouse name: Not on file   • Number of children: Not on file   • Years of education: Not on file   • Highest education level: Not on file   Tobacco Use   • Smoking status: Former Smoker   • Smokeless tobacco: Never Used   Substance and Sexual Activity   • Alcohol use: No     Frequency: Never   • Drug use: No   • Sexual activity: Defer       Consults:   Consults     Date and Time Order Name Status Description    9/27/2019 1635 Inpatient Gastroenterology Consult      9/26/2019 0947 Inpatient Infectious Diseases Consult Completed     9/24/2019 1908 Hematology & Oncology Inpatient Consult Completed     9/23/2019 1819 Hospitalist (on-call MD unless specified) Completed     9/4/2019 2124 Hematology & Oncology Inpatient Consult Completed           Pertinent Test Results:        Microbiology Results (last 10 days)     Procedure Component Value - Date/Time    Clostridium Difficile Toxin - Stool, Per Rectum [[id number]] Collected:  09/26/19 1426    Lab Status:   Final result Specimen:  Stool from Per Rectum Updated:  09/27/19 0742    Narrative:       The following orders were created for panel order Clostridium Difficile Toxin - Stool, Per Rectum.  Procedure                               Abnormality         Status                     ---------                               -----------         ------                     Clostridium Difficile EI...[[id number]]  Normal              Final result                 Please view results for these tests on the individual orders.    Gastrointestinal Panel, PCR - Stool, Per Rectum [[id number]]  (Normal) Collected:  09/26/19 1426    Lab Status:  Final result Specimen:  Stool from Per Rectum Updated:  09/27/19 0617     Campylobacter Not Detected     Plesiomonas shigelloides Not Detected     Salmonella Not Detected     Vibrio Not Detected     Vibrio cholerae Not Detected     Yersinia enterocolitica Not Detected     Enteroaggregative E. coli (EAEC) Not Detected     Enteropathogenic E. coli (EPEC) Not Detected     Enterotoxigenic E. coli (ETEC) lt/st Not Detected     Shiga-like toxin-producing E. coli (STEC) stx1/stx2 Not Detected     E. coli O157 Not Detected     Shigella/Enteroinvasive E. coli (EIEC) Not Detected     Cryptosporidium Not Detected     Cyclospora cayetanensis Not Detected     Entamoeba histolytica Not Detected     Giardia lamblia Not Detected     Adenovirus F40/41 Not Detected     Astrovirus Not Detected     Norovirus GI/GII Not Detected     Rotavirus A Not Detected     Sapovirus (I, II, IV or V) Not Detected    Narrative:       If Aeromonas, Staphylococcus aureus or Bacillus cereus are suspected, please order [id number]: Stool Culture, Aeromonas, S aureus, B Cereus.    Clostridium Difficile EIA - Stool, Per Rectum [[id number]]  (Normal) Collected:  09/26/19 1426    Lab Status:  Final result Specimen:  Stool from Per Rectum Updated:  09/27/19 0742     C Diff GDH / Toxin Negative               Imaging Results (all)     Procedure  Component Value Units Date/Time    CT Abdomen Pelvis Without Contrast [[id number]] Collected:  09/28/19 1953     Updated:  09/28/19 2000    Narrative:          DATE OF EXAM:  9/28/2019 6:48 PM     PROCEDURE:  CT ABDOMEN PELVIS WO CONTRAST-     INDICATIONS:  Nausea, vomiting, diarrhea     COMPARISON:  No Comparisons Available     TECHNIQUE:  Routine transaxial slices were obtained through the abdomen and pelvis  without the administration of intravenous contrast. Reconstructed  coronal and sagittal images were also obtained. Automated exposure  control and iterative construction methods were used.     FINDINGS:  Visualized lower chest demonstrate small bilateral pleural effusions  right greater than left. Heart size top normal. No pericardial effusion  or pleural effusion. Mitral annular calcifications noted. Coronary  calcifications noted. Lung bases without consolidation.     Lack of contrast limits assessment of abdominal organs and vasculature.  The liver and spleen are normal in size and contour. Normal adrenal  glands. Pancreas without findings of pancreatitis. Cholelithiasis noted.  No pericholecystic inflammation. Left kidney nonvisualized suspect  surgically absent. The right kidney is without hydronephrosis or  obstructive uropathy. Urinary bladder is thin walled. There is streak  artifact from right hip prosthesis which limits complete assessment of  the distal right ureter.     Mild anasarca. Negative for pneumoperitoneum. There is mild inflammatory  stranding surrounding several diverticula involving the descending colon  for example on image 71 concerning for acute diverticulitis. No abscess  or fluid collection. Appendix is nonvisualized, no findings to suggest  appendicitis. Moderate amount stool in the rectum. No acute fracture.  Mild degenerative findings in the lower lumbar spine facet arthropathy.  Surgical changes of prior ventral hernia repair.       Impression:       1. Mild diverticulitis at  the junction of the descending and sigmoid  colon. No abscess.  2. Cholelithiasis.  3. Small bilateral pleural effusions.  4. Status post left nephrectomy.     Electronically Signed By-Ambrosio Bowen On:9/28/2019 7:58 PM  This report was finalized on [id number] by  Ambrosio Bowen, .    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     Comparison Exams: None available.     Technique: Single AP radiograph of the abdomen     FINDINGS:  There is a nonobstructive bowel gas pattern. There is gaseous distention  of nondilated small and large bowel. Moderate stool is present  throughout the colon. No pathological calcifications are identified.  Postsurgical changes are noted in the abdomen and right hip. There are  degenerative changes in the lower lumbar spine.       Impression:       Gaseous distention of nondilated small and large bowel, which could  represent gastroenteritis or ileus.     Electronically Signed By-DR. Germán Owens MD On:9/26/2019 3:55 PM  This report was finalized on [id number] by DR. Germán Owens MD.    XR Chest 1 View [[id number]] Collected:  09/23/19 1642     Updated:  09/23/19 1645    Narrative:       DATE OF EXAM:  9/23/2019 4:34 PM     PROCEDURE:  XR CHEST 1 VW-     INDICATIONS:  Bone cancer, hypertension, past tobacco abuse.      COMPARISON:  09/06/2019.     TECHNIQUE:   Single radiographic view of the chest was obtained.     FINDINGS:  The heart is enlarged. The pulmonary vascular markings are normal. The  lungs and pleural spaces are clear of active disease.  There are chronic  age-related changes  involving the bony thorax and thoracic aorta.       Impression:          1. Mild cardiomegaly.  2. No active pulmonary disease.     Electronically Signed By-Martinez Edwards On:9/23/2019 4:43 PM  This report was finalized on [id number] by  Martinez Edwards, .          ECG 12 Lead   Preliminary Result   HEART RATE= 91  bpm   RR Interval= 661  ms   AR Interval=   ms   P Horizontal Axis=   deg   P Front Axis=   deg   QRSD Interval= 93  ms   QT Interval= 387  ms   QRS Axis= 0  deg   T Wave Axis=   deg   - ABNORMAL ECG -   Atrial fibrillation   Indeterminate axis   Low voltage, extremity leads   Nonspecific T abnormalities, inferior leads   Electronically Signed By:    Date and Time of Study: 2019-10-01 03:38:12      ECG 12 Lead   Preliminary Result   HEART RATE= 106  bpm   RR Interval= 538  ms   AR Interval=   ms   P Horizontal Axis=   deg   P Front Axis=   deg   QRSD Interval= 81  ms   QT Interval= 345  ms   QRS Axis= 57  deg   T Wave Axis= 89  deg   - ABNORMAL ECG -   Atrial flutter with predominant 3:1 AV block   Low voltage, extremity leads   Nonspecific T abnormalities, lateral leads   Electronically Signed By:    Date and Time of Study: 2019-09-27 20:02:24      ECG 12 Lead   Final Result   HEART RATE= 104  bpm   RR Interval= 612  ms   AR Interval= 182  ms   P Horizontal Axis= 9  deg   P Front Axis= 14  deg   QRSD Interval= 89  ms   QT Interval= 373  ms   QRS Axis= 67  deg   T Wave Axis= 34  deg   - ABNORMAL ECG -   Sinus tachycardia   Atrial premature complexes   Electronically Signed By: Rogers Rob (RUBI) 24-Sep-2019 11:06:01   Date and Time of Study: 2019-09-23 16:30:52      ECG 12 Lead    (Results Pending)         Condition on Discharge:  stable    Vital Signs  Temp:  [97.5 °F (36.4 °C)-98.6 °F (37 °C)] 98.6 °F (37 °C)  Heart Rate:  [74-98] 74  Resp:  [15-20] 17  BP: ()/(49-69) 106/68    Physical Exam:  Physical Exam   Constitutional: He is oriented to person, place, and time and well-developed, well-nourished,  and in no distress.   HENT:   Head: Normocephalic and atraumatic.   Mouth/Throat: Oropharynx is clear and moist.   Eyes: Conjunctivae and EOM are normal. Pupils are equal, round, and reactive to light.   Neck: Normal range of motion. Neck supple.   Cardiovascular: Normal rate, regular rhythm, normal heart sounds and intact distal pulses.   Pulmonary/Chest: Effort normal and breath sounds normal.   Abdominal: Soft. Bowel sounds are normal. He exhibits no distension. There is no tenderness.   Musculoskeletal: Normal range of motion. He exhibits no edema.   Neurological: He is alert and oriented to person, place, and time. GCS score is 15.   Skin: Skin is warm.   Psychiatric: Mood, memory, affect and judgment normal.   Vitals reviewed.        Discharge Disposition  Home or Self Care with [facility]      Discharge Medications     Discharge Medications      New Medications      Instructions Start Date   diphenoxylate-atropine 2.5-0.025 MG per tablet  Commonly known as:  LOMOTIL   2 tablets, Oral, 4 Times Daily PRN      loperamide 2 MG capsule  Commonly known as:  IMODIUM   2 mg, Oral, 4 Times Daily PRN         Continue These Medications      Instructions Start Date   acetaminophen 500 MG tablet  Commonly known as:  TYLENOL   500 mg, Oral, Every 6 Hours PRN      acyclovir 400 MG tablet  Commonly known as:  ZOVIRAX   400 mg, Oral, 2 Times Daily      atenolol 25 MG tablet  Commonly known as:  TENORMIN   12.5 mg, Oral, Daily      atorvastatin 40 MG tablet  Commonly known as:  LIPITOR   40 mg, Oral, Daily      clotrimazole 10 MG kaitlyn  Commonly known as:  MYCELEX   10 mg, Oral, 3 Times Daily PRN      Cyanocobalamin 500 MCG chewable tablet   500 mcg, Oral, Daily      fluconazole 200 MG tablet  Commonly known as:  DIFLUCAN   200 mg, Oral, Daily      levothyroxine 75 MCG tablet  Commonly known as:  SYNTHROID, LEVOTHROID   75 mcg, Oral, Daily      multivitamin with minerals tablet tablet   1 tablet, Oral, Daily       ondansetron ODT 4 MG disintegrating tablet  Commonly known as:  ZOFRAN-ODT   8 mg, Oral, Every 8 Hours PRN      sulfamethoxazole-trimethoprim 800-160 MG per tablet  Commonly known as:  BACTRIM DS,SEPTRA DS   1 tablet, Oral, Daily      Venetoclax 100 MG tablet  Commonly known as:  VENCLEXTA   Take 1 tablet by mouth on day 1. Take 2 tablets by mouth on day 2. Take 4 tablets by mouth daily starting day 3.      vitamin D 83104 units capsule capsule  Commonly known as:  ERGOCALCIFEROL   50,000 Units, Oral, Weekly, Unknown what day of the week patient takes medication          Stop These Medications    hydrALAZINE 50 MG tablet  Commonly known as:  APRESOLINE            Discharge Diet:   Diet Instructions     Diet: Specialty Diet; Thin Liquids, No Restrictions; Low Microbial      Discharge Diet:  Specialty Diet    Fluid Consistency:  Thin Liquids, No Restrictions    Specialty Diets:  Low Microbial          Activity at Discharge:   Activity Instructions     Activity as Tolerated            Follow-up Appointments  No future appointments.  Additional Instructions for the Follow-ups that You Need to Schedule     Ambulatory Referral to Home Health   As directed      Face to Face Visit Date:  9/26/2019    Follow-up provider for Plan of Care?:  I treated the patient in an acute care facility and will not continue treatment after discharge.    Follow-up provider:  NYIDA BARBOUR [[id number]]    Reason/Clinical Findings:  resume cares    Describe mobility limitations that make leaving home difficult:  pt has unsteady gait    Nursing/Therapeutic Services Requested:  Physical Therapy Skilled Nursing    Skilled nursing orders:  Medication education    PT orders:  Gait Training Strengthening    Weight Bearing Status:  As Tolerated    Frequency:  1 Week 1         Call MD With Problems / Concerns   As directed      Instructions: Call [phone] or email [email or web address] for problems or concerns.    Order Comments:   Instructions: Call [phone] or email [email or web address] for problems or concerns.          Discharge Follow-up with PCP   As directed       Currently Documented PCP:    Jovana Nur APRN    PCP Phone Number:    [phone]     Follow Up Details:  as needed         Discharge Follow-up with Specified Provider: gastroenterology   As directed      To:  gastroenterology    Follow Up Details:  as needed         Discharge Follow-up with Specified Provider: oncology; 1 Week   As directed      To:  oncology    Follow Up:  1 Week                  Risk for Readmission (LACE) Score: 10 (10/2/2019  6:01 AM)    Electronically signed by ILENE Agudelo, 10/02/19, 9:42 AM.

## 2019-10-03 NOTE — PROGRESS NOTES
Discharge Planning Assessment   [facility]     Patient Name: Rock Rock  MRN: [id number]  Today's Date: 10/3/2019    Admit Date: 9/23/2019          Plan    Final Discharge Disposition Code  50 - home with hospice    Final Note  home with [facility]          Carol naegele rn  Case management  Office number [phone]  Cell phone [phone]

## 2019-10-03 NOTE — OUTREACH NOTE
Prep Survey      Responses   Facility patient discharged from?  [facility]   Is patient eligible?  No   What are the reasons patient is not eligible?  Hospice/Pallative Care   Does the patient have one of the following disease processes/diagnoses(primary or secondary)?  Other   Prep survey completed?  Yes          Rayne Lozada RN

## 2019-10-08 NOTE — TELEPHONE ENCOUNTER
Received message from Princess, Pharmacist w/[facility].  She states there is an interaction between Venclexta and Fluconasole.  She states they need to speak with MD or NP regarding this before the Velnclexta can be released.  jamison Sancheza

## 2019-10-11 NOTE — TELEPHONE ENCOUNTER
I called pt's wife and she confirmed pt is now on Hospice. I called [facility] and cancelled the rx for Venclexta.

## 2020-01-01 ENCOUNTER — APPOINTMENT (OUTPATIENT)
Dept: GENERAL RADIOLOGY | Facility: HOSPITAL | Age: 80
End: 2020-01-01

## 2020-01-01 ENCOUNTER — HOSPITAL ENCOUNTER (INPATIENT)
Facility: HOSPITAL | Age: 80
LOS: 2 days | Discharge: HOSPICE | End: 2020-02-07
Attending: INTERNAL MEDICINE | Admitting: INTERNAL MEDICINE

## 2020-01-01 ENCOUNTER — HOSPITAL ENCOUNTER (INPATIENT)
Facility: HOSPITAL | Age: 80
LOS: 1 days | End: 2020-02-08
Attending: INTERNAL MEDICINE | Admitting: INTERNAL MEDICINE

## 2020-01-01 VITALS
WEIGHT: 170.1 LBS | SYSTOLIC BLOOD PRESSURE: 91 MMHG | DIASTOLIC BLOOD PRESSURE: 69 MMHG | BODY MASS INDEX: 21.83 KG/M2 | HEIGHT: 74 IN | TEMPERATURE: 101.6 F | HEART RATE: 111 BPM | OXYGEN SATURATION: 97 % | RESPIRATION RATE: 20 BRPM

## 2020-01-01 VITALS
BODY MASS INDEX: 21.8 KG/M2 | DIASTOLIC BLOOD PRESSURE: 58 MMHG | HEART RATE: 135 BPM | TEMPERATURE: 104.7 F | SYSTOLIC BLOOD PRESSURE: 89 MMHG | WEIGHT: 169.75 LBS | OXYGEN SATURATION: 89 %

## 2020-01-01 LAB
ALBUMIN SERPL-MCNC: 3.2 G/DL (ref 3.5–5.2)
ALBUMIN/GLOB SERPL: 0.8 G/DL
ALP SERPL-CCNC: 80 U/L (ref 39–117)
ALT SERPL W P-5'-P-CCNC: 13 U/L (ref 1–41)
ANION GAP SERPL CALCULATED.3IONS-SCNC: 17 MMOL/L (ref 5–15)
ANISOCYTOSIS BLD QL: ABNORMAL
APTT PPP: 29.4 SECONDS (ref 61–76.5)
APTT PPP: 50.3 SECONDS (ref 61–76.5)
APTT PPP: 52.8 SECONDS (ref 61–76.5)
APTT PPP: 58.3 SECONDS (ref 61–76.5)
APTT PPP: 66.4 SECONDS (ref 61–76.5)
APTT PPP: 71.2 SECONDS (ref 61–76.5)
APTT PPP: 74.2 SECONDS (ref 61–76.5)
ARTERIAL PATENCY WRIST A: POSITIVE
AST SERPL-CCNC: 34 U/L (ref 1–40)
ATMOSPHERIC PRESS: ABNORMAL MM[HG]
BACTERIA BLD CULT: ABNORMAL
BACTERIA UR QL AUTO: ABNORMAL /HPF
BASE EXCESS BLDA CALC-SCNC: 3.7 MMOL/L (ref 0–3)
BDY SITE: ABNORMAL
BILIRUB SERPL-MCNC: 0.4 MG/DL (ref 0.2–1.2)
BILIRUB UR QL STRIP: NEGATIVE
BLASTS NFR BLD MANUAL: 72 % (ref 0–0)
BLASTS NFR BLD MANUAL: 89 % (ref 0–0)
BLASTS NFR BLD MANUAL: 95 % (ref 0–0)
BUN BLD-MCNC: 23 MG/DL (ref 8–23)
BUN/CREAT SERPL: 12.1 (ref 7–25)
CA-I BLDA-SCNC: 0.99 MMOL/L (ref 1.15–1.33)
CA-I SERPL ISE-MCNC: 1.11 MMOL/L (ref 1.2–1.3)
CALCIUM SPEC-SCNC: 8.7 MG/DL (ref 8.6–10.5)
CHLORIDE SERPL-SCNC: 97 MMOL/L (ref 98–107)
CLARITY UR: ABNORMAL
CO2 BLDA-SCNC: 30.9 MMOL/L (ref 22–29)
CO2 SERPL-SCNC: 23 MMOL/L (ref 22–29)
COARSE GRAN CASTS URNS QL MICRO: ABNORMAL /LPF
COLOR UR: YELLOW
CREAT BLD-MCNC: 1.9 MG/DL (ref 0.76–1.27)
D-LACTATE SERPL-SCNC: 0.5 MMOL/L (ref 0.5–2)
DEPRECATED RDW RBC AUTO: 49 FL (ref 37–54)
DEPRECATED RDW RBC AUTO: 49.9 FL (ref 37–54)
DEPRECATED RDW RBC AUTO: 50.8 FL (ref 37–54)
EOSINOPHIL # BLD MANUAL: 1.8 10*3/MM3 (ref 0–0.4)
EOSINOPHIL NFR BLD MANUAL: 1 % (ref 0.3–6.2)
ERYTHROCYTE [DISTWIDTH] IN BLOOD BY AUTOMATED COUNT: 15.2 % (ref 12.3–15.4)
ERYTHROCYTE [DISTWIDTH] IN BLOOD BY AUTOMATED COUNT: 15.2 % (ref 12.3–15.4)
ERYTHROCYTE [DISTWIDTH] IN BLOOD BY AUTOMATED COUNT: 15.3 % (ref 12.3–15.4)
GFR SERPL CREATININE-BSD FRML MDRD: 34 ML/MIN/1.73
GLOBULIN UR ELPH-MCNC: 3.8 GM/DL
GLUCOSE BLD-MCNC: 146 MG/DL (ref 65–99)
GLUCOSE BLDC GLUCOMTR-MCNC: 179 MG/DL (ref 70–105)
GLUCOSE BLDC GLUCOMTR-MCNC: 195 MG/DL (ref 74–100)
GLUCOSE UR STRIP-MCNC: NEGATIVE MG/DL
HCO3 BLDA-SCNC: 29.4 MMOL/L (ref 21–28)
HCT VFR BLD AUTO: 28.2 % (ref 37.5–51)
HCT VFR BLD AUTO: 30.2 % (ref 37.5–51)
HCT VFR BLD AUTO: 31.9 % (ref 37.5–51)
HCT VFR BLDA CALC: 33 % (ref 38–51)
HEMODILUTION: NO
HGB BLD-MCNC: 10.2 G/DL (ref 13–17.7)
HGB BLD-MCNC: 10.8 G/DL (ref 13–17.7)
HGB BLD-MCNC: 9.7 G/DL (ref 13–17.7)
HGB BLDA-MCNC: 11.2 G/DL (ref 12–17)
HGB UR QL STRIP.AUTO: ABNORMAL
HOROWITZ INDEX BLD+IHG-RTO: 100 %
HYALINE CASTS UR QL AUTO: ABNORMAL /LPF
INR PPP: 1.26 (ref 0.9–1.1)
KETONES UR QL STRIP: NEGATIVE
LAB AP CASE REPORT: NORMAL
LEUKOCYTE ESTERASE UR QL STRIP.AUTO: ABNORMAL
LYMPHOCYTES # BLD MANUAL: 5.07 10*3/MM3 (ref 0.7–3.1)
LYMPHOCYTES # BLD MANUAL: 5.17 10*3/MM3 (ref 0.7–3.1)
LYMPHOCYTES # BLD MANUAL: 5.41 10*3/MM3 (ref 0.7–3.1)
LYMPHOCYTES NFR BLD MANUAL: 2 % (ref 5–12)
LYMPHOCYTES NFR BLD MANUAL: 3 % (ref 19.6–45.3)
LYMPHOCYTES NFR BLD MANUAL: 8 % (ref 5–12)
MAGNESIUM SERPL-MCNC: 2.5 MG/DL (ref 1.6–2.4)
MCH RBC QN AUTO: 31 PG (ref 26.6–33)
MCH RBC QN AUTO: 31.4 PG (ref 26.6–33)
MCH RBC QN AUTO: 32.3 PG (ref 26.6–33)
MCHC RBC AUTO-ENTMCNC: 33.8 G/DL (ref 31.5–35.7)
MCHC RBC AUTO-ENTMCNC: 33.8 G/DL (ref 31.5–35.7)
MCHC RBC AUTO-ENTMCNC: 34.4 G/DL (ref 31.5–35.7)
MCV RBC AUTO: 92 FL (ref 79–97)
MCV RBC AUTO: 92.9 FL (ref 79–97)
MCV RBC AUTO: 93.7 FL (ref 79–97)
METAMYELOCYTES NFR BLD MANUAL: 2 % (ref 0–0)
MODALITY: ABNORMAL
MONOCYTES # BLD AUTO: 13.78 10*3/MM3 (ref 0.1–0.9)
MONOCYTES # BLD AUTO: 3.61 10*3/MM3 (ref 0.1–0.9)
MYELOCYTES NFR BLD MANUAL: 1 % (ref 0–0)
MYELOCYTES NFR BLD MANUAL: 9 % (ref 0–0)
NEUTROPHILS # BLD AUTO: 1.72 10*3/MM3 (ref 1.7–7)
NEUTROPHILS # BLD AUTO: 3.38 10*3/MM3 (ref 1.7–7)
NEUTROPHILS # BLD AUTO: 3.61 10*3/MM3 (ref 1.7–7)
NEUTROPHILS NFR BLD MANUAL: 1 % (ref 42.7–76)
NEUTROPHILS NFR BLD MANUAL: 2 % (ref 42.7–76)
NEUTROPHILS NFR BLD MANUAL: 2 % (ref 42.7–76)
NITRITE UR QL STRIP: NEGATIVE
NT-PROBNP SERPL-MCNC: ABNORMAL PG/ML (ref 5–1800)
PATH REPORT.FINAL DX SPEC: NORMAL
PATHOLOGY REVIEW: YES
PCO2 BLDA: 48.8 MM HG (ref 35–48)
PH BLDA: 7.39 PH UNITS (ref 7.35–7.45)
PH UR STRIP.AUTO: 5.5 [PH] (ref 5–8)
PHOSPHATE SERPL-MCNC: 4.4 MG/DL (ref 2.5–4.5)
PLAT MORPH BLD: NORMAL
PLATELET # BLD AUTO: 36 10*3/MM3 (ref 140–450)
PLATELET # BLD AUTO: 36 10*3/MM3 (ref 140–450)
PLATELET # BLD AUTO: 43 10*3/MM3 (ref 140–450)
PMV BLD AUTO: 6 FL (ref 6–12)
PMV BLD AUTO: 6.5 FL (ref 6–12)
PMV BLD AUTO: 6.7 FL (ref 6–12)
PO2 BLDA: 152.4 MM HG (ref 83–108)
POIKILOCYTOSIS BLD QL SMEAR: ABNORMAL
POTASSIUM BLD-SCNC: 3.2 MMOL/L (ref 3.5–5.2)
POTASSIUM BLD-SCNC: 3.7 MMOL/L (ref 3.5–5.2)
POTASSIUM BLD-SCNC: 4 MMOL/L (ref 3.5–5.2)
POTASSIUM BLD-SCNC: 4 MMOL/L (ref 3.5–5.2)
POTASSIUM BLDA-SCNC: 3.6 MMOL/L (ref 3.5–4.5)
PROT SERPL-MCNC: 7 G/DL (ref 6–8.5)
PROT UR QL STRIP: ABNORMAL
PROTHROMBIN TIME: 12.7 SECONDS (ref 9.6–11.7)
RBC # BLD AUTO: 3 10*6/MM3 (ref 4.14–5.8)
RBC # BLD AUTO: 3.25 10*6/MM3 (ref 4.14–5.8)
RBC # BLD AUTO: 3.46 10*6/MM3 (ref 4.14–5.8)
RBC # UR: ABNORMAL /HPF
RBC MORPH BLD: NORMAL
RBC MORPH BLD: NORMAL
REF LAB TEST METHOD: ABNORMAL
SAO2 % BLDCOA: 99.3 % (ref 94–98)
SCAN SLIDE: NORMAL
SMALL PLATELETS BLD QL SMEAR: ABNORMAL
SMALL PLATELETS BLD QL SMEAR: ABNORMAL
SODIUM BLD-SCNC: 137 MMOL/L (ref 136–145)
SODIUM BLDA-SCNC: 142 MMOL/L (ref 138–146)
SP GR UR STRIP: 1.01 (ref 1–1.03)
SQUAMOUS #/AREA URNS HPF: ABNORMAL /HPF
TROPONIN T SERPL-MCNC: 0.06 NG/ML (ref 0–0.03)
UROBILINOGEN UR QL STRIP: ABNORMAL
VARIANT LYMPHS NFR BLD MANUAL: 7 % (ref 0–5)
WBC MORPH BLD: NORMAL
WBC NRBC COR # BLD: 169 10*3/MM3 (ref 3.4–10.8)
WBC NRBC COR # BLD: 172.2 10*3/MM3 (ref 3.4–10.8)
WBC NRBC COR # BLD: 180.4 10*3/MM3 (ref 3.4–10.8)
WBC UR QL AUTO: ABNORMAL /HPF

## 2020-01-01 PROCEDURE — 85730 THROMBOPLASTIN TIME PARTIAL: CPT | Performed by: INTERNAL MEDICINE

## 2020-01-01 PROCEDURE — 84484 ASSAY OF TROPONIN QUANT: CPT | Performed by: INTERNAL MEDICINE

## 2020-01-01 PROCEDURE — 85730 THROMBOPLASTIN TIME PARTIAL: CPT | Performed by: SURGERY

## 2020-01-01 PROCEDURE — 85007 BL SMEAR W/DIFF WBC COUNT: CPT | Performed by: PHYSICIAN ASSISTANT

## 2020-01-01 PROCEDURE — 85610 PROTHROMBIN TIME: CPT | Performed by: PHYSICIAN ASSISTANT

## 2020-01-01 PROCEDURE — 83735 ASSAY OF MAGNESIUM: CPT | Performed by: INTERNAL MEDICINE

## 2020-01-01 PROCEDURE — 25010000002 LORAZEPAM PER 2 MG: Performed by: PHYSICIAN ASSISTANT

## 2020-01-01 PROCEDURE — 71045 X-RAY EXAM CHEST 1 VIEW: CPT

## 2020-01-01 PROCEDURE — 25010000002 MORPHINE PER 10 MG: Performed by: PHYSICIAN ASSISTANT

## 2020-01-01 PROCEDURE — 99233 SBSQ HOSP IP/OBS HIGH 50: CPT | Performed by: INTERNAL MEDICINE

## 2020-01-01 PROCEDURE — 82330 ASSAY OF CALCIUM: CPT

## 2020-01-01 PROCEDURE — 85018 HEMOGLOBIN: CPT

## 2020-01-01 PROCEDURE — 25010000002 HEPARIN (PORCINE) PER 1000 UNITS: Performed by: PHYSICIAN ASSISTANT

## 2020-01-01 PROCEDURE — 25010000002 LORAZEPAM PER 2 MG: Performed by: INTERNAL MEDICINE

## 2020-01-01 PROCEDURE — 85007 BL SMEAR W/DIFF WBC COUNT: CPT | Performed by: INTERNAL MEDICINE

## 2020-01-01 PROCEDURE — 94799 UNLISTED PULMONARY SVC/PX: CPT

## 2020-01-01 PROCEDURE — 83880 ASSAY OF NATRIURETIC PEPTIDE: CPT | Performed by: PHYSICIAN ASSISTANT

## 2020-01-01 PROCEDURE — 82330 ASSAY OF CALCIUM: CPT | Performed by: PHYSICIAN ASSISTANT

## 2020-01-01 PROCEDURE — 81001 URINALYSIS AUTO W/SCOPE: CPT | Performed by: PHYSICIAN ASSISTANT

## 2020-01-01 PROCEDURE — 87186 SC STD MICRODIL/AGAR DIL: CPT | Performed by: INTERNAL MEDICINE

## 2020-01-01 PROCEDURE — 84100 ASSAY OF PHOSPHORUS: CPT | Performed by: INTERNAL MEDICINE

## 2020-01-01 PROCEDURE — 25010000002 FENTANYL CITRATE (PF) 100 MCG/2ML SOLUTION: Performed by: INTERNAL MEDICINE

## 2020-01-01 PROCEDURE — 25010000002 CALCIUM GLUCONATE-NACL 1-0.675 GM/50ML-% SOLUTION: Performed by: INTERNAL MEDICINE

## 2020-01-01 PROCEDURE — 25010000002 HYDROMORPHONE PER 4 MG: Performed by: SURGERY

## 2020-01-01 PROCEDURE — 25010000002 NALOXONE PER 1 MG

## 2020-01-01 PROCEDURE — 87186 SC STD MICRODIL/AGAR DIL: CPT | Performed by: PHYSICIAN ASSISTANT

## 2020-01-01 PROCEDURE — 85025 COMPLETE CBC W/AUTO DIFF WBC: CPT | Performed by: PHYSICIAN ASSISTANT

## 2020-01-01 PROCEDURE — 87077 CULTURE AEROBIC IDENTIFY: CPT | Performed by: PHYSICIAN ASSISTANT

## 2020-01-01 PROCEDURE — 99239 HOSP IP/OBS DSCHRG MGMT >30: CPT | Performed by: INTERNAL MEDICINE

## 2020-01-01 PROCEDURE — 87150 DNA/RNA AMPLIFIED PROBE: CPT | Performed by: INTERNAL MEDICINE

## 2020-01-01 PROCEDURE — 99223 1ST HOSP IP/OBS HIGH 75: CPT | Performed by: INTERNAL MEDICINE

## 2020-01-01 PROCEDURE — 80053 COMPREHEN METABOLIC PANEL: CPT | Performed by: INTERNAL MEDICINE

## 2020-01-01 PROCEDURE — 82962 GLUCOSE BLOOD TEST: CPT

## 2020-01-01 PROCEDURE — 99233 SBSQ HOSP IP/OBS HIGH 50: CPT | Performed by: NURSE PRACTITIONER

## 2020-01-01 PROCEDURE — 87086 URINE CULTURE/COLONY COUNT: CPT | Performed by: PHYSICIAN ASSISTANT

## 2020-01-01 PROCEDURE — 25010000002 HYDROMORPHONE PER 4 MG: Performed by: NURSE PRACTITIONER

## 2020-01-01 PROCEDURE — 99234 HOSP IP/OBS SM DT SF/LOW 45: CPT | Performed by: INTERNAL MEDICINE

## 2020-01-01 PROCEDURE — 25010000002 LORAZEPAM PER 2 MG: Performed by: NURSE PRACTITIONER

## 2020-01-01 PROCEDURE — 82803 BLOOD GASES ANY COMBINATION: CPT

## 2020-01-01 PROCEDURE — 84132 ASSAY OF SERUM POTASSIUM: CPT | Performed by: INTERNAL MEDICINE

## 2020-01-01 PROCEDURE — 83605 ASSAY OF LACTIC ACID: CPT

## 2020-01-01 PROCEDURE — 87040 BLOOD CULTURE FOR BACTERIA: CPT | Performed by: INTERNAL MEDICINE

## 2020-01-01 PROCEDURE — 85730 THROMBOPLASTIN TIME PARTIAL: CPT | Performed by: PHYSICIAN ASSISTANT

## 2020-01-01 PROCEDURE — 99222 1ST HOSP IP/OBS MODERATE 55: CPT | Performed by: NURSE PRACTITIONER

## 2020-01-01 PROCEDURE — 25010000002 MORPHINE PER 10 MG: Performed by: NURSE PRACTITIONER

## 2020-01-01 PROCEDURE — 99232 SBSQ HOSP IP/OBS MODERATE 35: CPT | Performed by: INTERNAL MEDICINE

## 2020-01-01 PROCEDURE — 99222 1ST HOSP IP/OBS MODERATE 55: CPT | Performed by: INTERNAL MEDICINE

## 2020-01-01 PROCEDURE — 85025 COMPLETE CBC W/AUTO DIFF WBC: CPT | Performed by: INTERNAL MEDICINE

## 2020-01-01 PROCEDURE — 36600 WITHDRAWAL OF ARTERIAL BLOOD: CPT

## 2020-01-01 PROCEDURE — 80051 ELECTROLYTE PANEL: CPT

## 2020-01-01 RX ORDER — DIPHENHYDRAMINE HYDROCHLORIDE 50 MG/ML
25 INJECTION INTRAMUSCULAR; INTRAVENOUS EVERY 6 HOURS PRN
Status: DISCONTINUED | OUTPATIENT
Start: 2020-01-01 | End: 2020-01-01 | Stop reason: HOSPADM

## 2020-01-01 RX ORDER — LORAZEPAM 2 MG/ML
1 CONCENTRATE ORAL
Status: DISCONTINUED | OUTPATIENT
Start: 2020-01-01 | End: 2020-01-01 | Stop reason: HOSPADM

## 2020-01-01 RX ORDER — LORAZEPAM 2 MG/ML
0.5 CONCENTRATE ORAL
Status: DISCONTINUED | OUTPATIENT
Start: 2020-01-01 | End: 2020-01-01 | Stop reason: HOSPADM

## 2020-01-01 RX ORDER — SODIUM CHLORIDE 9 MG/ML
75 INJECTION, SOLUTION INTRAVENOUS CONTINUOUS
Status: DISCONTINUED | OUTPATIENT
Start: 2020-01-01 | End: 2020-01-01

## 2020-01-01 RX ORDER — LORAZEPAM 1 MG/1
2 TABLET ORAL
Status: DISCONTINUED | OUTPATIENT
Start: 2020-01-01 | End: 2020-01-01 | Stop reason: HOSPADM

## 2020-01-01 RX ORDER — OXYCODONE HYDROCHLORIDE 5 MG/1
5 TABLET ORAL EVERY 4 HOURS PRN
Status: DISCONTINUED | OUTPATIENT
Start: 2020-01-01 | End: 2020-01-01 | Stop reason: HOSPADM

## 2020-01-01 RX ORDER — LORAZEPAM 2 MG/ML
2 CONCENTRATE ORAL
Status: CANCELLED | OUTPATIENT
Start: 2020-01-01 | End: 2020-02-17

## 2020-01-01 RX ORDER — DIPHENOXYLATE HYDROCHLORIDE AND ATROPINE SULFATE 2.5; .025 MG/1; MG/1
1 TABLET ORAL
Status: DISCONTINUED | OUTPATIENT
Start: 2020-01-01 | End: 2020-01-01 | Stop reason: HOSPADM

## 2020-01-01 RX ORDER — LORAZEPAM 1 MG/1
1 TABLET ORAL
Status: DISCONTINUED | OUTPATIENT
Start: 2020-01-01 | End: 2020-01-01 | Stop reason: HOSPADM

## 2020-01-01 RX ORDER — CALCIUM GLUCONATE 20 MG/ML
2 INJECTION, SOLUTION INTRAVENOUS AS NEEDED
Status: DISCONTINUED | OUTPATIENT
Start: 2020-01-01 | End: 2020-01-01

## 2020-01-01 RX ORDER — PROMETHAZINE HYDROCHLORIDE 12.5 MG/1
12.5 SUPPOSITORY RECTAL EVERY 4 HOURS PRN
Status: CANCELLED | OUTPATIENT
Start: 2020-01-01

## 2020-01-01 RX ORDER — PROMETHAZINE HYDROCHLORIDE 12.5 MG/1
12.5 SUPPOSITORY RECTAL EVERY 4 HOURS PRN
Status: DISCONTINUED | OUTPATIENT
Start: 2020-01-01 | End: 2020-01-01 | Stop reason: HOSPADM

## 2020-01-01 RX ORDER — MORPHINE SULFATE 4 MG/ML
4 INJECTION, SOLUTION INTRAMUSCULAR; INTRAVENOUS
Status: DISCONTINUED | OUTPATIENT
Start: 2020-01-01 | End: 2020-01-01 | Stop reason: HOSPADM

## 2020-01-01 RX ORDER — SODIUM CHLORIDE 0.9 % (FLUSH) 0.9 %
10 SYRINGE (ML) INJECTION AS NEEDED
Status: DISCONTINUED | OUTPATIENT
Start: 2020-01-01 | End: 2020-01-01 | Stop reason: HOSPADM

## 2020-01-01 RX ORDER — GLYCOPYRROLATE 0.2 MG/ML
0.2 INJECTION INTRAMUSCULAR; INTRAVENOUS
Status: DISCONTINUED | OUTPATIENT
Start: 2020-01-01 | End: 2020-01-01 | Stop reason: HOSPADM

## 2020-01-01 RX ORDER — LORAZEPAM 2 MG/ML
0.5 INJECTION INTRAMUSCULAR
Status: DISCONTINUED | OUTPATIENT
Start: 2020-01-01 | End: 2020-01-01 | Stop reason: HOSPADM

## 2020-01-01 RX ORDER — ACETAMINOPHEN 325 MG/1
650 TABLET ORAL EVERY 4 HOURS PRN
Status: CANCELLED | OUTPATIENT
Start: 2020-01-01

## 2020-01-01 RX ORDER — MIRTAZAPINE 15 MG/1
15 TABLET, FILM COATED ORAL NIGHTLY
COMMUNITY

## 2020-01-01 RX ORDER — HYDROCODONE BITARTRATE AND ACETAMINOPHEN 5; 325 MG/1; MG/1
1 TABLET ORAL EVERY 8 HOURS PRN
COMMUNITY

## 2020-01-01 RX ORDER — ACETAMINOPHEN 650 MG/1
650 SUPPOSITORY RECTAL EVERY 4 HOURS PRN
Status: DISCONTINUED | OUTPATIENT
Start: 2020-01-01 | End: 2020-01-01 | Stop reason: HOSPADM

## 2020-01-01 RX ORDER — GLYCOPYRROLATE 0.2 MG/ML
0.4 INJECTION INTRAMUSCULAR; INTRAVENOUS
Status: DISCONTINUED | OUTPATIENT
Start: 2020-01-01 | End: 2020-01-01 | Stop reason: HOSPADM

## 2020-01-01 RX ORDER — DOCUSATE SODIUM 100 MG/1
100 CAPSULE, LIQUID FILLED ORAL 2 TIMES DAILY PRN
Status: DISCONTINUED | OUTPATIENT
Start: 2020-01-01 | End: 2020-01-01

## 2020-01-01 RX ORDER — LORAZEPAM 1 MG/1
1 TABLET ORAL
Status: CANCELLED | OUTPATIENT
Start: 2020-01-01 | End: 2020-02-17

## 2020-01-01 RX ORDER — LORAZEPAM 2 MG/ML
2 INJECTION INTRAMUSCULAR
Status: DISCONTINUED | OUTPATIENT
Start: 2020-01-01 | End: 2020-01-01 | Stop reason: HOSPADM

## 2020-01-01 RX ORDER — SODIUM CHLORIDE 0.9 % (FLUSH) 0.9 %
10 SYRINGE (ML) INJECTION AS NEEDED
Status: CANCELLED | OUTPATIENT
Start: 2020-01-01

## 2020-01-01 RX ORDER — TRAZODONE HYDROCHLORIDE 50 MG/1
25 TABLET ORAL NIGHTLY PRN
COMMUNITY

## 2020-01-01 RX ORDER — ACETAMINOPHEN 325 MG/1
650 TABLET ORAL EVERY 4 HOURS PRN
Status: DISCONTINUED | OUTPATIENT
Start: 2020-01-01 | End: 2020-01-01 | Stop reason: HOSPADM

## 2020-01-01 RX ORDER — SCOLOPAMINE TRANSDERMAL SYSTEM 1 MG/1
1 PATCH, EXTENDED RELEASE TRANSDERMAL
Status: DISCONTINUED | OUTPATIENT
Start: 2020-01-01 | End: 2020-01-01 | Stop reason: HOSPADM

## 2020-01-01 RX ORDER — MAGNESIUM SULFATE HEPTAHYDRATE 40 MG/ML
2 INJECTION, SOLUTION INTRAVENOUS AS NEEDED
Status: DISCONTINUED | OUTPATIENT
Start: 2020-01-01 | End: 2020-01-01

## 2020-01-01 RX ORDER — HYDROMORPHONE HCL 110MG/55ML
0.25 PATIENT CONTROLLED ANALGESIA SYRINGE INTRAVENOUS
Status: DISCONTINUED | OUTPATIENT
Start: 2020-01-01 | End: 2020-01-01 | Stop reason: HOSPADM

## 2020-01-01 RX ORDER — DIPHENHYDRAMINE HCL 25 MG
25 TABLET ORAL EVERY 6 HOURS PRN
Status: CANCELLED | OUTPATIENT
Start: 2020-01-01

## 2020-01-01 RX ORDER — MORPHINE SULFATE 20 MG/ML
10 SOLUTION ORAL
Status: DISCONTINUED | OUTPATIENT
Start: 2020-01-01 | End: 2020-01-01 | Stop reason: HOSPADM

## 2020-01-01 RX ORDER — PROMETHAZINE HYDROCHLORIDE 6.25 MG/5ML
12.5 SYRUP ORAL EVERY 4 HOURS PRN
Status: DISCONTINUED | OUTPATIENT
Start: 2020-01-01 | End: 2020-01-01 | Stop reason: HOSPADM

## 2020-01-01 RX ORDER — ACETAMINOPHEN 325 MG/1
650 TABLET ORAL EVERY 4 HOURS PRN
Status: DISCONTINUED | OUTPATIENT
Start: 2020-01-01 | End: 2020-01-01 | Stop reason: SDUPTHER

## 2020-01-01 RX ORDER — HALOPERIDOL 2 MG/ML
2 SOLUTION ORAL EVERY 4 HOURS PRN
Status: CANCELLED | OUTPATIENT
Start: 2020-01-01

## 2020-01-01 RX ORDER — MORPHINE SULFATE 4 MG/ML
4 INJECTION, SOLUTION INTRAMUSCULAR; INTRAVENOUS
Status: CANCELLED | OUTPATIENT
Start: 2020-01-01 | End: 2020-02-17

## 2020-01-01 RX ORDER — HALOPERIDOL 2 MG/1
2 TABLET ORAL EVERY 4 HOURS PRN
Status: CANCELLED | OUTPATIENT
Start: 2020-01-01

## 2020-01-01 RX ORDER — MORPHINE SULFATE 20 MG/ML
10 SOLUTION ORAL
Status: CANCELLED | OUTPATIENT
Start: 2020-01-01 | End: 2020-02-17

## 2020-01-01 RX ORDER — LORAZEPAM 2 MG/ML
1 INJECTION INTRAMUSCULAR
Status: DISCONTINUED | OUTPATIENT
Start: 2020-01-01 | End: 2020-01-01 | Stop reason: HOSPADM

## 2020-01-01 RX ORDER — LORAZEPAM 0.5 MG/1
0.5 TABLET ORAL
Status: DISCONTINUED | OUTPATIENT
Start: 2020-01-01 | End: 2020-01-01 | Stop reason: HOSPADM

## 2020-01-01 RX ORDER — LORAZEPAM 2 MG/ML
0.5 INJECTION INTRAMUSCULAR
Status: CANCELLED | OUTPATIENT
Start: 2020-01-01 | End: 2020-02-17

## 2020-01-01 RX ORDER — LORAZEPAM 2 MG/ML
0.5 CONCENTRATE ORAL
Status: CANCELLED | OUTPATIENT
Start: 2020-01-01 | End: 2020-02-17

## 2020-01-01 RX ORDER — FENTANYL CITRATE 50 UG/ML
50 INJECTION, SOLUTION INTRAMUSCULAR; INTRAVENOUS
Status: COMPLETED | OUTPATIENT
Start: 2020-01-01 | End: 2020-01-01

## 2020-01-01 RX ORDER — HEPARIN SODIUM 10000 [USP'U]/100ML
18 INJECTION, SOLUTION INTRAVENOUS
Status: DISCONTINUED | OUTPATIENT
Start: 2020-01-01 | End: 2020-01-01

## 2020-01-01 RX ORDER — ACETAMINOPHEN 160 MG/5ML
650 SOLUTION ORAL EVERY 4 HOURS PRN
Status: DISCONTINUED | OUTPATIENT
Start: 2020-01-01 | End: 2020-01-01 | Stop reason: HOSPADM

## 2020-01-01 RX ORDER — PROMETHAZINE HYDROCHLORIDE 25 MG/1
12.5 TABLET ORAL EVERY 4 HOURS PRN
Status: DISCONTINUED | OUTPATIENT
Start: 2020-01-01 | End: 2020-01-01 | Stop reason: HOSPADM

## 2020-01-01 RX ORDER — GLYCOPYRROLATE 0.2 MG/ML
0.4 INJECTION INTRAMUSCULAR; INTRAVENOUS
Status: CANCELLED | OUTPATIENT
Start: 2020-01-01

## 2020-01-01 RX ORDER — MORPHINE SULFATE 4 MG/ML
6 INJECTION, SOLUTION INTRAMUSCULAR; INTRAVENOUS
Status: DISCONTINUED | OUTPATIENT
Start: 2020-01-01 | End: 2020-01-01 | Stop reason: HOSPADM

## 2020-01-01 RX ORDER — LORAZEPAM 2 MG/ML
2 CONCENTRATE ORAL
Status: DISCONTINUED | OUTPATIENT
Start: 2020-01-01 | End: 2020-01-01 | Stop reason: HOSPADM

## 2020-01-01 RX ORDER — ONDANSETRON 2 MG/ML
4 INJECTION INTRAMUSCULAR; INTRAVENOUS EVERY 6 HOURS PRN
Status: DISCONTINUED | OUTPATIENT
Start: 2020-01-01 | End: 2020-01-01 | Stop reason: HOSPADM

## 2020-01-01 RX ORDER — FENTANYL CITRATE 50 UG/ML
50 INJECTION, SOLUTION INTRAMUSCULAR; INTRAVENOUS
Status: DISCONTINUED | OUTPATIENT
Start: 2020-01-01 | End: 2020-01-01 | Stop reason: HOSPADM

## 2020-01-01 RX ORDER — CALCIUM GLUCONATE 20 MG/ML
1 INJECTION, SOLUTION INTRAVENOUS AS NEEDED
Status: DISCONTINUED | OUTPATIENT
Start: 2020-01-01 | End: 2020-01-01

## 2020-01-01 RX ORDER — MORPHINE SULFATE 20 MG/ML
20 SOLUTION ORAL
Status: DISCONTINUED | OUTPATIENT
Start: 2020-01-01 | End: 2020-01-01 | Stop reason: HOSPADM

## 2020-01-01 RX ORDER — CARBOXYMETHYLCELLULOSE SODIUM 10 MG/ML
1 GEL OPHTHALMIC
Status: CANCELLED | OUTPATIENT
Start: 2020-01-01

## 2020-01-01 RX ORDER — ONDANSETRON 2 MG/ML
4 INJECTION INTRAMUSCULAR; INTRAVENOUS EVERY 6 HOURS PRN
Status: CANCELLED | OUTPATIENT
Start: 2020-01-01

## 2020-01-01 RX ORDER — DIPHENHYDRAMINE HCL 25 MG
25 TABLET ORAL EVERY 6 HOURS PRN
Status: DISCONTINUED | OUTPATIENT
Start: 2020-01-01 | End: 2020-01-01 | Stop reason: HOSPADM

## 2020-01-01 RX ORDER — HALOPERIDOL 5 MG/ML
2 INJECTION INTRAMUSCULAR EVERY 4 HOURS PRN
Status: DISCONTINUED | OUTPATIENT
Start: 2020-01-01 | End: 2020-01-01 | Stop reason: HOSPADM

## 2020-01-01 RX ORDER — POTASSIUM CHLORIDE 1.5 G/1.77G
40 POWDER, FOR SOLUTION ORAL AS NEEDED
Status: DISCONTINUED | OUTPATIENT
Start: 2020-01-01 | End: 2020-01-01

## 2020-01-01 RX ORDER — HALOPERIDOL 2 MG/1
2 TABLET ORAL EVERY 4 HOURS PRN
Status: DISCONTINUED | OUTPATIENT
Start: 2020-01-01 | End: 2020-01-01 | Stop reason: HOSPADM

## 2020-01-01 RX ORDER — DIPHENHYDRAMINE HYDROCHLORIDE 50 MG/ML
25 INJECTION INTRAMUSCULAR; INTRAVENOUS EVERY 6 HOURS PRN
Status: CANCELLED | OUTPATIENT
Start: 2020-01-01

## 2020-01-01 RX ORDER — NITROGLYCERIN 0.4 MG/1
0.4 TABLET SUBLINGUAL
Status: DISCONTINUED | OUTPATIENT
Start: 2020-01-01 | End: 2020-01-01

## 2020-01-01 RX ORDER — ACETAMINOPHEN 650 MG/1
650 SUPPOSITORY RECTAL EVERY 4 HOURS PRN
Status: CANCELLED | OUTPATIENT
Start: 2020-01-01

## 2020-01-01 RX ORDER — LORAZEPAM 2 MG/ML
1 INJECTION INTRAMUSCULAR
Status: CANCELLED | OUTPATIENT
Start: 2020-01-01 | End: 2020-02-17

## 2020-01-01 RX ORDER — SODIUM CHLORIDE 0.9 % (FLUSH) 0.9 %
10 SYRINGE (ML) INJECTION EVERY 12 HOURS SCHEDULED
Status: DISCONTINUED | OUTPATIENT
Start: 2020-01-01 | End: 2020-01-01 | Stop reason: HOSPADM

## 2020-01-01 RX ORDER — FENTANYL 25 UG/H
1 PATCH TRANSDERMAL
Status: CANCELLED | OUTPATIENT
Start: 2020-02-09 | End: 2020-02-18

## 2020-01-01 RX ORDER — HALOPERIDOL 5 MG/ML
2 INJECTION INTRAMUSCULAR EVERY 4 HOURS PRN
Status: CANCELLED | OUTPATIENT
Start: 2020-01-01

## 2020-01-01 RX ORDER — MORPHINE SULFATE 20 MG/ML
20 SOLUTION ORAL
Status: CANCELLED | OUTPATIENT
Start: 2020-01-01 | End: 2020-02-17

## 2020-01-01 RX ORDER — BACLOFEN 10 MG/1
10 TABLET ORAL 2 TIMES DAILY PRN
COMMUNITY

## 2020-01-01 RX ORDER — GLYCOPYRROLATE 0.2 MG/ML
0.2 INJECTION INTRAMUSCULAR; INTRAVENOUS
Status: CANCELLED | OUTPATIENT
Start: 2020-01-01

## 2020-01-01 RX ORDER — ONDANSETRON 4 MG/1
4 TABLET, FILM COATED ORAL EVERY 6 HOURS PRN
Status: DISCONTINUED | OUTPATIENT
Start: 2020-01-01 | End: 2020-01-01

## 2020-01-01 RX ORDER — HYDROMORPHONE HCL 110MG/55ML
0.25 PATIENT CONTROLLED ANALGESIA SYRINGE INTRAVENOUS ONCE
Status: COMPLETED | OUTPATIENT
Start: 2020-01-01 | End: 2020-01-01

## 2020-01-01 RX ORDER — LORAZEPAM 2 MG/ML
2 INJECTION INTRAMUSCULAR
Status: CANCELLED | OUTPATIENT
Start: 2020-01-01 | End: 2020-02-17

## 2020-01-01 RX ORDER — SODIUM CHLORIDE 0.9 % (FLUSH) 0.9 %
10 SYRINGE (ML) INJECTION EVERY 12 HOURS SCHEDULED
Status: CANCELLED | OUTPATIENT
Start: 2020-01-01

## 2020-01-01 RX ORDER — LORAZEPAM 0.5 MG/1
0.5 TABLET ORAL
Status: CANCELLED | OUTPATIENT
Start: 2020-01-01 | End: 2020-02-17

## 2020-01-01 RX ORDER — GABAPENTIN 300 MG/1
600 CAPSULE ORAL 2 TIMES DAILY
Status: DISCONTINUED | OUTPATIENT
Start: 2020-01-01 | End: 2020-01-01 | Stop reason: HOSPADM

## 2020-01-01 RX ORDER — MAGNESIUM SULFATE HEPTAHYDRATE 40 MG/ML
4 INJECTION, SOLUTION INTRAVENOUS AS NEEDED
Status: DISCONTINUED | OUTPATIENT
Start: 2020-01-01 | End: 2020-01-01

## 2020-01-01 RX ORDER — LORAZEPAM 2 MG/ML
1 CONCENTRATE ORAL
Status: CANCELLED | OUTPATIENT
Start: 2020-01-01 | End: 2020-02-17

## 2020-01-01 RX ORDER — FENTANYL 25 UG/H
1 PATCH TRANSDERMAL
Status: DISCONTINUED | OUTPATIENT
Start: 2020-01-01 | End: 2020-01-01 | Stop reason: HOSPADM

## 2020-01-01 RX ORDER — PROMETHAZINE HYDROCHLORIDE 25 MG/1
12.5 TABLET ORAL EVERY 4 HOURS PRN
Status: CANCELLED | OUTPATIENT
Start: 2020-01-01

## 2020-01-01 RX ORDER — SCOLOPAMINE TRANSDERMAL SYSTEM 1 MG/1
1 PATCH, EXTENDED RELEASE TRANSDERMAL
Status: CANCELLED | OUTPATIENT
Start: 2020-01-01

## 2020-01-01 RX ORDER — POTASSIUM CHLORIDE 20 MEQ/1
40 TABLET, EXTENDED RELEASE ORAL AS NEEDED
Status: DISCONTINUED | OUTPATIENT
Start: 2020-01-01 | End: 2020-01-01

## 2020-01-01 RX ORDER — MORPHINE SULFATE 4 MG/ML
6 INJECTION, SOLUTION INTRAMUSCULAR; INTRAVENOUS
Status: CANCELLED | OUTPATIENT
Start: 2020-01-01 | End: 2020-02-17

## 2020-01-01 RX ORDER — FENTANYL 25 UG/H
1 PATCH TRANSDERMAL
Status: DISCONTINUED | OUTPATIENT
Start: 2020-02-09 | End: 2020-01-01 | Stop reason: HOSPADM

## 2020-01-01 RX ORDER — ACETAMINOPHEN 650 MG/1
650 SUPPOSITORY RECTAL EVERY 4 HOURS PRN
Status: DISCONTINUED | OUTPATIENT
Start: 2020-01-01 | End: 2020-01-01 | Stop reason: SDUPTHER

## 2020-01-01 RX ORDER — ACETAMINOPHEN 160 MG/5ML
650 SOLUTION ORAL EVERY 4 HOURS PRN
Status: CANCELLED | OUTPATIENT
Start: 2020-01-01

## 2020-01-01 RX ORDER — CHOLECALCIFEROL (VITAMIN D3) 125 MCG
5 CAPSULE ORAL NIGHTLY PRN
Status: DISCONTINUED | OUTPATIENT
Start: 2020-01-01 | End: 2020-01-01

## 2020-01-01 RX ORDER — ACETAMINOPHEN 160 MG/5ML
650 SOLUTION ORAL EVERY 4 HOURS PRN
Status: DISCONTINUED | OUTPATIENT
Start: 2020-01-01 | End: 2020-01-01 | Stop reason: SDUPTHER

## 2020-01-01 RX ORDER — PROMETHAZINE HYDROCHLORIDE 6.25 MG/5ML
12.5 SYRUP ORAL EVERY 4 HOURS PRN
Status: CANCELLED | OUTPATIENT
Start: 2020-01-01

## 2020-01-01 RX ORDER — HALOPERIDOL 2 MG/ML
2 SOLUTION ORAL EVERY 4 HOURS PRN
Status: DISCONTINUED | OUTPATIENT
Start: 2020-01-01 | End: 2020-01-01 | Stop reason: HOSPADM

## 2020-01-01 RX ORDER — HYDROMORPHONE HCL 110MG/55ML
0.25 PATIENT CONTROLLED ANALGESIA SYRINGE INTRAVENOUS
Status: CANCELLED | OUTPATIENT
Start: 2020-01-01 | End: 2020-02-15

## 2020-01-01 RX ORDER — CARBOXYMETHYLCELLULOSE SODIUM 10 MG/ML
1 GEL OPHTHALMIC
Status: DISCONTINUED | OUTPATIENT
Start: 2020-01-01 | End: 2020-01-01 | Stop reason: HOSPADM

## 2020-01-01 RX ORDER — POTASSIUM CHLORIDE
99 CRYSTALS MISCELLANEOUS DAILY
COMMUNITY

## 2020-01-01 RX ORDER — LORAZEPAM 1 MG/1
2 TABLET ORAL
Status: CANCELLED | OUTPATIENT
Start: 2020-01-01 | End: 2020-02-17

## 2020-01-01 RX ORDER — OXYCODONE HYDROCHLORIDE 5 MG/1
5 TABLET ORAL EVERY 4 HOURS PRN
Status: CANCELLED | OUTPATIENT
Start: 2020-01-01 | End: 2020-02-15

## 2020-01-01 RX ORDER — NALOXONE HYDROCHLORIDE 0.4 MG/ML
INJECTION, SOLUTION INTRAMUSCULAR; INTRAVENOUS; SUBCUTANEOUS
Status: COMPLETED
Start: 2020-01-01 | End: 2020-01-01

## 2020-01-01 RX ORDER — CALCIUM GLUCONATE 20 MG/ML
1 INJECTION, SOLUTION INTRAVENOUS ONCE
Status: COMPLETED | OUTPATIENT
Start: 2020-01-01 | End: 2020-01-01

## 2020-01-01 RX ADMIN — MORPHINE SULFATE 4 MG: 4 INJECTION INTRAVENOUS at 01:11

## 2020-01-01 RX ADMIN — HYDROMORPHONE HYDROCHLORIDE 0.25 MG: 2 INJECTION, SOLUTION INTRAMUSCULAR; INTRAVENOUS; SUBCUTANEOUS at 09:28

## 2020-01-01 RX ADMIN — HYDROMORPHONE HYDROCHLORIDE 0.25 MG: 2 INJECTION, SOLUTION INTRAMUSCULAR; INTRAVENOUS; SUBCUTANEOUS at 06:04

## 2020-01-01 RX ADMIN — Medication 10 ML: at 09:42

## 2020-01-01 RX ADMIN — FENTANYL 1 PATCH: 25 PATCH TRANSDERMAL at 15:56

## 2020-01-01 RX ADMIN — SODIUM CHLORIDE 75 ML/HR: 900 INJECTION, SOLUTION INTRAVENOUS at 03:17

## 2020-01-01 RX ADMIN — LORAZEPAM 1 MG: 2 INJECTION INTRAMUSCULAR; INTRAVENOUS at 15:34

## 2020-01-01 RX ADMIN — MORPHINE SULFATE 4 MG: 4 INJECTION INTRAVENOUS at 20:17

## 2020-01-01 RX ADMIN — HYDROMORPHONE HYDROCHLORIDE 0.25 MG: 2 INJECTION, SOLUTION INTRAMUSCULAR; INTRAVENOUS; SUBCUTANEOUS at 12:58

## 2020-01-01 RX ADMIN — CALCIUM GLUCONATE 1 G: 20 INJECTION, SOLUTION INTRAVENOUS at 02:38

## 2020-01-01 RX ADMIN — OXYCODONE HYDROCHLORIDE 5 MG: 5 TABLET ORAL at 21:24

## 2020-01-01 RX ADMIN — OXYCODONE HYDROCHLORIDE 5 MG: 5 TABLET ORAL at 20:18

## 2020-01-01 RX ADMIN — OXYCODONE HYDROCHLORIDE 5 MG: 5 TABLET ORAL at 14:44

## 2020-01-01 RX ADMIN — Medication 10 ML: at 09:28

## 2020-01-01 RX ADMIN — HYDROMORPHONE HYDROCHLORIDE 0.25 MG: 2 INJECTION, SOLUTION INTRAMUSCULAR; INTRAVENOUS; SUBCUTANEOUS at 22:34

## 2020-01-01 RX ADMIN — HYDROMORPHONE HYDROCHLORIDE 0.25 MG: 2 INJECTION, SOLUTION INTRAMUSCULAR; INTRAVENOUS; SUBCUTANEOUS at 17:40

## 2020-01-01 RX ADMIN — OXYCODONE HYDROCHLORIDE 5 MG: 5 TABLET ORAL at 01:53

## 2020-01-01 RX ADMIN — HYDROMORPHONE HYDROCHLORIDE 0.25 MG: 2 INJECTION, SOLUTION INTRAMUSCULAR; INTRAVENOUS; SUBCUTANEOUS at 04:10

## 2020-01-01 RX ADMIN — ACETAMINOPHEN 650 MG: 650 SUPPOSITORY RECTAL at 06:14

## 2020-01-01 RX ADMIN — MORPHINE SULFATE 4 MG: 4 INJECTION INTRAVENOUS at 23:00

## 2020-01-01 RX ADMIN — OXYCODONE HYDROCHLORIDE 5 MG: 5 TABLET ORAL at 08:17

## 2020-01-01 RX ADMIN — POTASSIUM CHLORIDE 40 MEQ: 1500 TABLET, EXTENDED RELEASE ORAL at 09:28

## 2020-01-01 RX ADMIN — Medication 10 ML: at 20:19

## 2020-01-01 RX ADMIN — SODIUM CHLORIDE 1000 ML: 900 INJECTION, SOLUTION INTRAVENOUS at 03:18

## 2020-01-01 RX ADMIN — ACETAMINOPHEN 650 MG: 650 SUPPOSITORY RECTAL at 11:02

## 2020-01-01 RX ADMIN — MORPHINE SULFATE 4 MG: 4 INJECTION INTRAVENOUS at 03:23

## 2020-01-01 RX ADMIN — HYDROMORPHONE HYDROCHLORIDE 0.25 MG: 2 INJECTION, SOLUTION INTRAMUSCULAR; INTRAVENOUS; SUBCUTANEOUS at 16:20

## 2020-01-01 RX ADMIN — HYDROMORPHONE HYDROCHLORIDE 0.25 MG: 2 INJECTION, SOLUTION INTRAMUSCULAR; INTRAVENOUS; SUBCUTANEOUS at 20:18

## 2020-01-01 RX ADMIN — HEPARIN SODIUM AND DEXTROSE 21.89 UNITS/KG/HR: 10000; 5 INJECTION INTRAVENOUS at 21:43

## 2020-01-01 RX ADMIN — HYDROMORPHONE HYDROCHLORIDE 0.25 MG: 2 INJECTION, SOLUTION INTRAMUSCULAR; INTRAVENOUS; SUBCUTANEOUS at 18:00

## 2020-01-01 RX ADMIN — HYDROMORPHONE HYDROCHLORIDE 0.25 MG: 2 INJECTION, SOLUTION INTRAMUSCULAR; INTRAVENOUS; SUBCUTANEOUS at 08:14

## 2020-01-01 RX ADMIN — LORAZEPAM 1 MG: 2 INJECTION INTRAMUSCULAR; INTRAVENOUS at 17:40

## 2020-01-01 RX ADMIN — HYDROMORPHONE HYDROCHLORIDE 0.25 MG: 2 INJECTION, SOLUTION INTRAMUSCULAR; INTRAVENOUS; SUBCUTANEOUS at 15:56

## 2020-01-01 RX ADMIN — HYDROMORPHONE HYDROCHLORIDE 0.25 MG: 2 INJECTION, SOLUTION INTRAMUSCULAR; INTRAVENOUS; SUBCUTANEOUS at 11:23

## 2020-01-01 RX ADMIN — HYDROMORPHONE HYDROCHLORIDE 0.25 MG: 2 INJECTION, SOLUTION INTRAMUSCULAR; INTRAVENOUS; SUBCUTANEOUS at 12:17

## 2020-01-01 RX ADMIN — LORAZEPAM 1 MG: 2 INJECTION INTRAMUSCULAR; INTRAVENOUS at 23:01

## 2020-01-01 RX ADMIN — ACETAMINOPHEN 650 MG: 650 SUPPOSITORY RECTAL at 17:40

## 2020-01-01 RX ADMIN — MORPHINE SULFATE 6 MG: 4 INJECTION INTRAVENOUS at 08:19

## 2020-01-01 RX ADMIN — GABAPENTIN 600 MG: 300 CAPSULE ORAL at 21:27

## 2020-01-01 RX ADMIN — FENTANYL CITRATE 50 MCG: 50 INJECTION, SOLUTION INTRAMUSCULAR; INTRAVENOUS at 03:10

## 2020-01-01 RX ADMIN — MELATONIN TAB 5 MG 5 MG: 5 TAB at 20:18

## 2020-01-01 RX ADMIN — HEPARIN SODIUM AND DEXTROSE 21.89 UNITS/KG/HR: 10000; 5 INJECTION INTRAVENOUS at 05:33

## 2020-01-01 RX ADMIN — NALOXONE HYDROCHLORIDE 0.2 MG: 0.4 INJECTION, SOLUTION INTRAMUSCULAR; INTRAVENOUS; SUBCUTANEOUS at 03:10

## 2020-01-01 RX ADMIN — HEPARIN SODIUM AND DEXTROSE: 10000; 5 INJECTION INTRAVENOUS at 15:18

## 2020-01-01 RX ADMIN — HEPARIN SODIUM AND DEXTROSE 18 UNITS/KG/HR: 10000; 5 INJECTION INTRAVENOUS at 07:45

## 2020-01-01 RX ADMIN — HEPARIN SODIUM AND DEXTROSE 21.88 UNITS/KG/HR: 10000; 5 INJECTION INTRAVENOUS at 22:49

## 2020-01-01 RX ADMIN — HYDROMORPHONE HYDROCHLORIDE 0.25 MG: 2 INJECTION, SOLUTION INTRAMUSCULAR; INTRAVENOUS; SUBCUTANEOUS at 02:03

## 2020-01-01 RX ADMIN — LORAZEPAM 1 MG: 2 INJECTION INTRAMUSCULAR; INTRAVENOUS at 20:17

## 2020-01-01 RX ADMIN — HYDROMORPHONE HYDROCHLORIDE 0.25 MG: 2 INJECTION, SOLUTION INTRAMUSCULAR; INTRAVENOUS; SUBCUTANEOUS at 01:52

## 2020-01-01 RX ADMIN — POTASSIUM CHLORIDE 40 MEQ: 1500 TABLET, EXTENDED RELEASE ORAL at 14:16

## 2020-01-01 RX ADMIN — LORAZEPAM 1 MG: 2 INJECTION INTRAMUSCULAR; INTRAVENOUS at 01:11

## 2020-01-01 RX ADMIN — Medication 10 ML: at 10:03

## 2020-01-01 RX ADMIN — LORAZEPAM 1 MG: 2 INJECTION INTRAMUSCULAR; INTRAVENOUS at 12:54

## 2020-01-01 RX ADMIN — Medication 10 ML: at 21:23

## 2020-01-01 RX ADMIN — HYDROMORPHONE HYDROCHLORIDE 0.25 MG: 2 INJECTION, SOLUTION INTRAMUSCULAR; INTRAVENOUS; SUBCUTANEOUS at 22:05

## 2020-01-01 RX ADMIN — LORAZEPAM 1 MG: 2 INJECTION INTRAMUSCULAR; INTRAVENOUS at 06:03

## 2020-01-01 RX ADMIN — HEPARIN SODIUM AND DEXTROSE 21.89 UNITS/KG/HR: 10000; 5 INJECTION INTRAVENOUS at 11:00

## 2020-01-01 RX ADMIN — HYDROMORPHONE HYDROCHLORIDE 0.25 MG: 2 INJECTION, SOLUTION INTRAMUSCULAR; INTRAVENOUS; SUBCUTANEOUS at 12:16

## 2020-01-01 RX ADMIN — OXYCODONE HYDROCHLORIDE 5 MG: 5 TABLET ORAL at 16:35

## 2020-01-01 RX ADMIN — HYDROMORPHONE HYDROCHLORIDE 0.25 MG: 2 INJECTION, SOLUTION INTRAMUSCULAR; INTRAVENOUS; SUBCUTANEOUS at 10:02

## 2020-01-01 RX ADMIN — ACETAMINOPHEN 650 MG: 325 TABLET, FILM COATED ORAL at 14:16

## 2020-01-01 RX ADMIN — MORPHINE SULFATE 4 MG: 4 INJECTION INTRAVENOUS at 06:03

## 2020-01-01 RX ADMIN — LORAZEPAM 1 MG: 2 INJECTION INTRAMUSCULAR; INTRAVENOUS at 03:24

## 2020-02-05 PROBLEM — I70.90 ARTERIAL OCCLUSION: Status: ACTIVE | Noted: 2020-01-01

## 2020-02-05 PROBLEM — E78.5 HYPERLIPIDEMIA: Chronic | Status: ACTIVE | Noted: 2020-01-01

## 2020-02-05 PROBLEM — C95.90 LEUKEMIA (HCC): Chronic | Status: ACTIVE | Noted: 2020-01-01

## 2020-02-05 PROBLEM — F03.90 DEMENTIA (HCC): Chronic | Status: ACTIVE | Noted: 2020-01-01

## 2020-02-05 NOTE — PLAN OF CARE
Problem: Patient Care Overview  Goal: Plan of Care Review  Outcome: Ongoing (interventions implemented as appropriate)  Flowsheets (Taken 2/5/2020 9368)  Progress: no change  Plan of Care Reviewed With: patient; spouse  Outcome Summary: Patient still complaining of pain in his LLE. Pain medication given per MD order. Patient very anxious and tearful. Potassium replaced. Will continue to monitor and try to keep pain controlled

## 2020-02-05 NOTE — PROGRESS NOTES
Malnutrition Severity Assessment    Patient Name:  Rokc Rock  YOB: 1940  MRN: [id number]  Admit Date:  2/5/2020    Patient meets criteria for : Moderate (non-severe) Malnutrition    Comments:      Moderate chronic illness malnutrition related to likely inadequate calorie intake with catabolic illness as evidenced by muscle/fat loss noted in physical exam and weight loss of 14% in 6 months.     Current nutrition interventions: Regular diet with Boost Plus supplement BID to provide 720 calories and 28g protein.    Malnutrition Severity Assessment  Malnutrition Type: Chronic Disease - Related Malnutrition     Malnutrition Type (last 8 hours)      Malnutrition Severity Assessment     Row Name 02/05/20 1558       Malnutrition Severity Assessment    Malnutrition Type  Chronic Disease - Related Malnutrition    Row Name 02/05/20 1558       Unintentional Weight Loss     Unintentional Weight Loss   Weight loss of 10% in six months    Row Name 02/05/20 1558       Muscle Loss    Loss of Muscle Mass Findings  Moderate    Christian Region  Moderate - slight depression    Clavicle Bone Region  Moderate - some protrusion in females, visible in males    Acromion Bone Region  Moderate - acromion may slightly protrude    Scapular Bone Region  Moderate - mild depression, bones may show slightly    Dorsal Hand Region  Moderate - slight depression    Patellar Region  Moderate - patella more prominent, less muscle definition around patella    Anterior Thigh Region  Moderate - mild depression on inner thigh    Posterior Calf Region  Moderate - some roundness, slight firmness    Row Name 02/05/20 1553       Fat Loss    Subcutaneous Fat Loss Findings  Moderate    Orbital Region   Moderate -  somewhat hollowness, slightly dark circles    Upper Arm Region  Moderate - some fat tissue, not ample    Thoracic & Lumbar Region  Moderate - ribs visible with mild depressions, iliac crest somewhat prominent    Row Name 02/05/20 1554        Criteria Met (Must meet criteria for severity in at least 2 of these categories: M Wasting, Fat Loss, Fluid, Secondary Signs, Wt. Status, Intake)    Patient meets criteria for   Moderate (non-severe) Malnutrition          Electronically signed by:  Bernadette Curry RD  02/05/20 3:59 PM

## 2020-02-05 NOTE — CONSULTS
Name: Rock Rock ADMIT: 2020   : 1940  PCP: Jovana Nur APRN    MRN: [id number] LOS: 0 days   AGE/SEX: 79 y.o. male  ROOM: [facility] 303/1     Inpatient Vascular Surgery Consult  Consult performed by: Dave Krause MD  Consult ordered by: Wilber Farias MD  Reason for consult: Left leg pain        Subjective     History of Present Illness  79 y.o. male who was transferred to this hospital from [facility] because of left leg pain.  The emergency room they are asked that the hospitalist admit him here and the hospitalist asked emergency room to call me to discuss last night.  The emergency room physician there was not sure if this was an acute arterial or deep vein thrombosis.  He said the patient had pulses in his feet.  He did say he had he had cancer but did not reveal that he was in hospice care.  The patient's medical records were reviewed.  In summary, he has advanced AML and was discharged to hospice care about 4 months ago.  He began to have pain in his left foot about a week and a half ago.  This is been progressive over time.  He says it is improved with some cream that an [facility] man provided him but the pain is now constant.  He is tearful and upset.    HPI Elements:  Location: Left foot and calf  Quality: Throbbing and aching  Severity: Severe  Duration: 10 days  Context: This occurred in the setting of advanced AML  Modifying Factors: Temporary improvement with a topical ointment/cream  Associated Signs and Symptoms: Patient has poor motor and no sensation of the foot    Review of Systems   Constitutional: Positive for activity change, appetite change and fatigue.   Gastrointestinal: Positive for nausea.   Musculoskeletal: Positive for gait problem.   Skin: Positive for color change.   Hematological: Bruises/bleeds easily.   Psychiatric/Behavioral: Positive for confusion. The patient is nervous/anxious.    All other systems reviewed and are negative.      PMH:  Chronic kidney disease status post left nephrectomy in 2006, AML (advanced), hypertension, breast cancer, pancytopenia    PSH: Right mastectomy and left nephrectomy    Family History: Unknown    Social History: Patient lives at home with his wife.  He is a non-smoker.  He has home and hospice care    Allergies: Codeine      Objective   Temp:  [97.6 °F (36.4 °C)] 97.6 °F (36.4 °C)  Heart Rate:  [102] 102  Resp:  [18] 18  BP: (132)/(87) 132/87    No intake/output data recorded.    Physical Exam   Constitutional: He is oriented to person, place, and time. He appears well-developed and well-nourished. He appears ill. No distress.   Eyes: Conjunctivae and lids are normal.   Neck: No JVD present.   Cardiovascular: Regular rhythm.   No murmur heard.  Pulses:       Carotid pulses are 2+ on the right side, and 2+ on the left side.       Femoral pulses are 2+ on the right side, and 2+ on the left side.  The right foot has palpable pedal pulses.  The left foot is cyanotic.  There is no sensation from the distal third of the leg down.  No motor function of the foot.  Only has proximal thigh.   Pulmonary/Chest: Effort normal and breath sounds normal.   Abdominal: Normal appearance. There is no tenderness.   Musculoskeletal:   No digital cyanosis or clubbing   Neurological: He is alert and oriented to person, place, and time.   Psychiatric: His mood appears anxious. He exhibits a depressed mood.   Vitals reviewed.    Data Reviewed:  CBC    Results from last 7 days   Lab Units 02/05/20  0645   WBC 10*3/mm3 169.00*   HEMOGLOBIN g/dL 10.8*   PLATELETS 10*3/mm3 43*     BMP   Results from last 7 days   Lab Units 02/05/20  0645   POTASSIUM mmol/L 3.2*     Coag   Results from last 7 days   Lab Units 02/05/20  0645   INR  1.26*   APTT seconds 29.4*     HbA1C No results found for: HGBA1C  Infection     Radiology(recent) No radiology results for the last day    Imaging Studies:  CT scan done on 9/28/2019 showed mild diverticulitis.  This  was a noncontrasted study.  His arteries are bit ectatic but no viola aneurysm was noted down to the common femorals although they do measure 14 mm in size      Active Hospital Problems    Diagnosis  POA   • **Arterial occlusion [I70.90]  Yes   • Dementia (CMS/HCC) [F03.90]  Yes   • Hyperlipidemia [E78.5]  Yes   • Hypokalemia [E87.6]  Yes   • 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 (CMS/HCC) [C92.00]  Yes   • Anemia [D64.9]  Yes   • Hypothyroidism [E03.9]  Yes   • Hypertension [I10]  Yes      Resolved Hospital Problems   No resolved problems to display.     Problem Points:  2:  Patient has an established problem, worsening  3:  Patient has a new problem, with no additional work-up planned (max of 1)  Total problem points:4 or more    Data Points:  1:  I have reviewed or order clinical lab test  2:  I have personally and independently review of image, tracing, or specimen  2:  I have reviewed and summation of old records and/or discussed the patients care with another health care provider  Total data points:4 or more    Risk Points:  High:  Chronic illness with severe exacerbation or progression    Billin    Assessment/Plan       Arterial occlusion    Hypertension    Hypothyroidism    Anemia    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 (CMS/HCC)    Hypokalemia    Dementia (CMS/HCC)    Hyperlipidemia      79 y.o. male with advanced AML who is been in hospice care for about 4 months.  He presented to an outside emergency room because of progressive pain over the last 10 days in his left foot.  He came in to try to find some relief of pain and was transferred here to the hospitalist service.  On exam, he has a mottled cold left foot.  No motor or sensory function is noted.  He has palpable femoral pulses and is warm to about the knee.  His white blood cell count is 167 and his platelet count is 47.  Patient is quite tearful and upset because he is not sure what to do and is afraid he is going to get in trouble with hospice  because he is in the hospital.  Currently, his heparin drip is not running.  I pointed this out to the nursing staff.    This is obviously a very difficult situation.  I think we need to focus on pain control for now.  The patient does not have a salvageable left foot.  It is been ischemic 4 days and there is no motor or sensory function.  I talked with him about the only operative intervention I could consider would be a palliative amputation.  This would improve his ischemic pain but then would introduce surgical pain.  Furthermore, with advanced AML, thrombocytopenia he is at high risk of complications with wound healing and subsequent events as the underlying problem cannot be addressed.  Additionally, he is already in hospice and palliative care.    For now, I will write to continue the heparin drip to see if that helps improve his pain also give him some pain medication and would like to see with the primary service thinks about his difficult situation.    I discussed the patients findings and my recommendations with patient and nursing staff.  Please call my office with any question: [phone]    Dave Krause MD  02/05/20  7:40 AM

## 2020-02-05 NOTE — PROGRESS NOTES
Discharge Planning Assessment  [facility]     Patient Name: Rock Rock  MRN: [id number]  Today's Date: 2/5/2020    Admit Date: 2/5/2020    Discharge Needs Assessment     Row Name 02/05/20 1423       Living Environment    Lives With  spouse    Current Living Arrangements  home/apartment/condo    Primary Care Provided by  self    Provides Primary Care For  no one    Family Caregiver if Needed  spouse    Able to Return to Prior Arrangements  yes       Resource/Environmental Concerns    Resource/Environmental Concerns  none    Transportation Concerns  car, none       Transition Planning    Patient/Family Anticipates Transition to  home with family    Patient/Family Anticipated Services at Transition  hospice care    Transportation Anticipated  family or friend will provide       Discharge Needs Assessment    Readmission Within the Last 30 Days  no previous admission in last 30 days    Concerns to be Addressed  discharge planning    Equipment Currently Used at Home  wheelchair;hospital bed    Anticipated Changes Related to Illness  none    Equipment Needed After Discharge  none        Discharge Plan     Row Name 02/05/20 1424       Plan    Plan  Pending hospital course - patient anticipates return home to resume [facility].     Patient/Family in Agreement with Plan  yes    Plan Comments  Met with patient at bedside who reports that he is current with [facility] and anticipates resuming their services at discharge. Spoke with [facility] nurse, Stephany, who reports patient is current with them, hospice diagnosis is Leukemia - their RN will meet with patient/family later in the day. Stephany reports there was mention of possible revocation by patient's wife, will follow up with [facility]/patient/family after [facility] visit. Barrier: Heparin drip.         Expected Discharge Date and Time     Expected Discharge Date Expected Discharge Time    Feb 7, 2020         Demographic Summary     Row Name 02/05/20 1426        General Information    Admission Type  inpatient    Arrived From  home    Referral Source  admission list    Reason for Consult  discharge planning        Functional Status     Row Name 02/05/20 1421       Functional Status    Usual Activity Tolerance  fair    Current Activity Tolerance  fair       Functional Status, IADL    Medications  assistive person    Meal Preparation  assistive person    Housekeeping  assistive person    Laundry  assistive person    Shopping  assistive person        Elva Solorio  [phone]

## 2020-02-05 NOTE — SIGNIFICANT NOTE
02/05/20 1309   Coping/Psychosocial   Observed Emotional State repeated requests;anxious   Verbalized Emotional State guilt  (Repeated said I am sorry, but unable to explain)   Plan of Care Reviewed With patient   Additional Documentation Pastoral/Spiritual Care (Group)   Psychosocial Support   Trust Relationship/Rapport care explained;thoughts/feelings acknowledged   Involvement in Care   Involvement in Care not present at bedside   Pastoral/Spiritual Care   Pastoral Care Visit Type initial   Pastoral Care Source patient request (describe)   Receptivity to Spiritual Care visit welcomed   Pastoral Care Request prayer support;spiritual/moral support   Pastoral Care Interventions prayer support provided   Pastoral Care Response expressing self, indicated difficulty;receptive of support;engaged in conversation   Use of Spiritual Resources prayer   Pastoral Care Follow-Up follow-up planned regularly for general support   Safety   Safety WDL ex   Safety Factors ID band on;upper side rails raised x 2;call light in reach;wheels locked;bed in low position   All Alarms alarm(s) activated and audible   Safety/Security Measures bed alarm set   Safety Management   Safety Promotion/Fall Prevention safety round/check completed;nonskid shoes/slippers when out of bed;fall prevention program maintained;activity supervised   Environmental Safety Modification assistive device/personal items within reach;clutter free environment maintained;lighting adjusted     PT kept repeating various sequences of numbers in between words. Would say he is sorry often, but would not elaborate. Asked PT if he is [employer] or prays for what he feels sorry about. PT responded to this and said he would like to pray. Prayed with PT and then after praying PT continued to pray until he fell asleep. Will follow up.

## 2020-02-05 NOTE — H&P
[facility] Hospital Medicine Services      Patient Name: Rock Rock  : 1940  MRN: [id number]  Primary Care Physician: Jovana Nur APRN  Date of admission: 2020    Patient Care Team:  Jovana Nur APRN as PCP - General (Family Medicine)          Subjective   History Present Illness     Chief Complaint: No chief complaint on file.      Mr. Rock is a 79 y.o. male with past medical history of dementia, hypertension, hypothyroidism, Hyperlipidemia and leukemia who presents to [facility] complaining of pain in left calf and foot.  ED provider from sending facility notes patient presented via EMS complaining of pain and discoloration of his foot for the past 2 days.  Patient is A&O x1 and is a generally poor historian with frequent tangential speech.  He does note his pain began on as a cramping sensation.  He states it has continued to get worse in spite of pain pill provided by his hospice provider.       At the sending facility patient found to have labs significant for potassium: 3.2, glucose: 208, creatinine: 1.96, WBCs: 179.5, hemoglobin: 11.1    History of Present Illness    Review of Systems   Unable to perform ROS: dementia           Personal History     Past Medical History:   Past Medical History:   Diagnosis Date   • Anemia    • Biclonal gammopathy     Diagnosed in    • Breast cancer (CMS/HCC)     History of breast cancer in  status post mastectomy and lymph node dissection   • Cancer of kidney (CMS/HCC)     Left kidney cancer status post nephrectomy    • Chronic kidney disease (CKD), stage III (moderate) (CMS/HCC)    • Dyslipidemia    • History of skin cancer     History of multiple skin cancers in    • Hypertension        Surgical History:      Past Surgical History:   Procedure Laterality Date   • MASTECTOMY      Right mastectomy   • NEPHRECTOMY      Left nephrectomy           Family History: family history includes Cancer in his  sister. Otherwise pertinent FHx was reviewed and unremarkable.     Social History:  reports that he has quit smoking. He has never used smokeless tobacco. He reports that he does not drink alcohol or use drugs.      Medications:  Prior to Admission medications    Medication Sig Start Date End Date Taking? Authorizing Provider   acetaminophen (TYLENOL) 500 MG tablet Take 500 mg by mouth Every 6 (Six) Hours As Needed for Mild Pain .    Susana Beard MD   acyclovir (ZOVIRAX) 400 MG tablet Take 400 mg by mouth 2 (Two) Times a Day. 9/4/19   Susana Beard MD   atenolol (TENORMIN) 25 MG tablet Take 12.5 mg by mouth Daily.    Susana Beard MD   atorvastatin (LIPITOR) 40 MG tablet Take 40 mg by mouth Daily.    Susana Beard MD   clotrimazole (MYCELEX) 10 MG kaitlyn Take 10 mg by mouth 3 (Three) Times a Day As Needed (thrush).    Susana Beard MD   Cyanocobalamin 500 MCG chewable tablet Chew 500 mcg Daily.    Susana Beard MD   diphenoxylate-atropine (LOMOTIL) 2.5-0.025 MG per tablet Take 2 tablets by mouth 4 (Four) Times a Day As Needed for Diarrhea. 10/2/19   Elva Alexis APRN   levothyroxine (SYNTHROID, LEVOTHROID) 75 MCG tablet Take 75 mcg by mouth Daily.    Susana Beard MD   loperamide (IMODIUM) 2 MG capsule Take 1 capsule by mouth 4 (Four) Times a Day As Needed for Diarrhea. 10/2/19   Elva Alexis APRN   Multiple Vitamins-Minerals (MULTIVITAMIN WITH MINERALS) tablet tablet Take 1 tablet by mouth Daily.    Susana Beard MD   ondansetron ODT (ZOFRAN-ODT) 4 MG disintegrating tablet Take 8 mg by mouth Every 8 (Eight) Hours As Needed for Nausea or Vomiting.    Susana Beard MD   sulfamethoxazole-trimethoprim (BACTRIM DS,SEPTRA DS) 800-160 MG per tablet Take 1 tablet by mouth Daily. 9/4/19   Abby Joseph APRN   Venetoclax (VENCLEXTA) 100 MG tablet Take 1 tablet by mouth on day 1. Take 2 tablets by mouth on day 2. Take 4 tablets by mouth daily  starting day 3. 9/18/19   Sergio Stevens MD   vitamin D (ERGOCALCIFEROL) 33706 units capsule capsule Take 50,000 Units by mouth 1 (One) Time Per Week. Unknown what day of the week patient takes medication    Provider, MD Susana       Allergies:    Allergies   Allergen Reactions   • Codeine Itching       Objective   Objective     Vital Signs  Temp:  [97.6 °F (36.4 °C)-101.4 °F (38.6 °C)] 98.4 °F (36.9 °C)  Heart Rate:  [] 103  Resp:  [18-20] 18  BP: (126-170)/(75-89) 135/78  SpO2:  [93 %-95 %] 95 %  on   ;   Device (Oxygen Therapy): room air  Body mass index is 21.99 kg/m².    Physical Exam   Constitutional: He appears well-developed and well-nourished. He appears distressed.   HENT:   Head: Normocephalic and atraumatic.   Right Ear: External ear normal.   Left Ear: External ear normal.   Nose: Nose normal.   Eyes: Conjunctivae and EOM are normal.   Constricted pupils bilaterally   Neck: Normal range of motion. No JVD present.   Cardiovascular: Normal rate, regular rhythm and normal heart sounds.   No palpable pulse in left lower extremity   Pulmonary/Chest: Effort normal and breath sounds normal.   Abdominal: Soft. Bowel sounds are normal.   Musculoskeletal: Normal range of motion.   Neurological: He is alert.   Patient currently oriented x1   Skin:   Left lower extremity pale, cold and cyanotic from mid calf distal.   Psychiatric:   Patient appears somewhat anxious with tangential speech   Vitals reviewed.      Results Review:  I have personally reviewed most recent lab results and agree with findings, most notably: Glucose, creatinine, WBCs And hemoglobin.    Results from last 7 days   Lab Units 02/05/20  0645   WBC 10*3/mm3 169.00*   HEMOGLOBIN g/dL 10.8*   HEMATOCRIT % 31.9*   PLATELETS 10*3/mm3 43*   INR  1.26*     Results from last 7 days   Lab Units 02/05/20  0645   POTASSIUM mmol/L 3.2*     CrCl cannot be calculated (Patient's most recent lab result is older than the maximum 30 days  allowed.).  Brief Urine Lab Results  (Last result in the past 365 days)      Color   Clarity   Blood   Leuk Est   Nitrite   Protein   CREAT   Urine HCG        08/19/19 1547 Yellow Clear Negative Negative Negative >=300 mg/dL (3+)               Microbiology Results (last 10 days)     ** No results found for the last 240 hours. **          ECG/EMG Results (most recent)     None                    No radiology results for the last 7 days      CrCl cannot be calculated (Patient's most recent lab result is older than the maximum 30 days allowed.).    Assessment/Plan   Assessment/Plan       Active Hospital Problems    Diagnosis  POA   • **Arterial occlusion [I70.90]  Yes   • Dementia (CMS/HCC) [F03.90]  Yes   • Hyperlipidemia [E78.5]  Yes   • Hypokalemia [E87.6]  Yes   • 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 (CMS/[facility]) [C92.00]  Yes   • Anemia [D64.9]  Yes   • Hypothyroidism [E03.9]  Yes   • Hypertension [I10]  Yes      Resolved Hospital Problems   No resolved problems to display.     1.  Arterial occlusion (left lower extremity)  -Continue heparin  -Vascular surgery consulted by sending facility  -Patient has Norco ordered for pain    2.  Acute myeloid leukemia  -WBCs: 179.5  -Start med rec shows patient may have pursued chemotherapy at one point however he seems to have stopped treatment based on documentation from sending facility.  -Patient currently under care of hospice at home    3.  Dementia  -Patient A&O x1 at time of my exam tangential thought processes, unsure patient baseline.  -Patient easily redirected and does seem to possess some recent short-term memory regarding present illness.    4.  Hypokalemia  -Potassium: 3.2 at sending facility  -Replacement protocol ordered  -Monitor BMP    5.  Anemia  -Hemoglobin: 11.1 at sending facility (likely chronic)  -Monitor CBC    6.  Hypertension  -Well controlled with a blood pressure on admission of 132/87  - Continue atenolol (pending completion of current med rec)  -  Monitor while admittied    7.  Hyperlipidemia  -Continue statin pending completion of current med rec    8.  Hypothyroidism  -Check TSH  -Continue levothyroxine      Active Hospital Problems:  No notes have been filed under this hospital service.  Service: Hospitalist          VTE Prophylaxis - Patient on heparin infusion.    CODE STATUS:    Code Status and Medical Interventions:   Ordered at: 02/05/20 0634     Level Of Support Discussed With:    Patient     Code Status:    CPR     Medical Interventions (Level of Support Prior to Arrest):    Full       Admission Status:  I believe this patient meets inpatient criteria.      I discussed the patient's findings and my recommendations with patient and nursing staff.        Electronically signed by Geovany Murillo PA-C, 02/05/20, 6:37 AM.  [facility] Hospitalist Team    I have reviewed the notes, assessments, and/or procedures performed by NP , I concur with her/his documentation of Rock Rock.      Patient personally seen and examined.  Patient has history of dementia as well as risk factors including hypertension hyperlipidemia and also has history of acute myeloid leukemia.  Patient came with left foot pain and mottling seemed like he has acute ischemia of the left lower extremity.  Patient has been started on heparin drip as well as we will do the pain control.  Patient was on hospice.  At this time we will continue current conservative pain medication as well as heparin drip and get vascular consult which has been obtained.  I spoke to family at the bedside as well.

## 2020-02-05 NOTE — CONSULTS
Nutrition Services    Patient Name:  Rock Rock  YOB: 1940  MRN: [id number]  Admit Date:  2/5/2020    Comments:     Moderate chronic illness malnutrition related to likely inadequate calorie intake with catabolic illness as evidenced by muscle/fat loss noted in physical exam and weight loss of 14% in 6 months.    Current nutrition interventions: Regular diet with Boost Plus supplement BID to provide 720 calories and 28g protein.    Monitor for plan of care.    Reason for Assessment                Reason for Assessment    Reason For Assessment 2/5: MST 3, NILSA, Hx NFPE       Diagnosis H&P:   79 y.o. male with past medical history of dementia, hypertension, hypothyroidism, Hyperlipidemia and leukemia who presents to [facility] complaining of pain in left calf and foot. Pt followed by Hospice for AML.    Current Problems:   Arterial occlusion (left lower extremity)  --LLE is not salvageable, plan for pain control and possible palliative amputation    AML    Dementia    Hypokalemia    HTN    HLD         Nutrition/Diet History                Nutrition/Diet History    Narrative     Pt sitting in bed, confused. Has meal tray nearby with a good portion consumed. Patient unable to give any information pertaining to UBW, meal intake PTA, etc.        Functional Status Unable to fully assess, CM note states pt lives with spouse and uses a wheelchair.        Food Allergies  NKFA       Factors Affecting Nutritional Intake  confusion, pain         Anthropometrics             Anthropometrics    Height 188cm, 74 inches    Weight 77.7kg, 171lb (2/5)       Admit Weight    Admit Weight 77.7kg       Ideal Body Weight (IBW)    Ideal Body Weight (IBW) 190lb    % Ideal Body Weight 90%       Usual Body Weight (UBW)    Usual Body Weight ALLY    % Usual Body Weight ALLY    Weight Hx      10/2/2019   Weight 76.4 kg (168 lb 6.9 oz)         9/3/2019   Weight 81.7 kg (180 lb 1.6 oz)         8/12/2019   Weight 90.4 kg  (199 lb 6.4 oz)          Body Mass Index (BMI)    BMI (kg/m2) 21.99 kg/m²           Labs/Medications                Labs    Pertinent Lab Results Comments K 3.2 low, Hgb 10.8 low         Medications    Pertinent Medications Comments Heparin IV, Dilaudid, KCl         Physical Findings                Physical Findings    Overall Physical Appearance NFPE completed- pt appears confused, but did consent to physical exam. See MSA.     Edema  None noted per EMR     Gastrointestinal None noted since admit     Tubes none     Oral/Mouth Cavity No issues reported     Skin No breakdown noted, LLE non-salvageable limb         Estimated/Assessed Needs              Calorie Requirements     Height Used for Calorie Calculations       Weight Used for Calorie Calculations      Formula chosen for Calorie Calculations       Estimated Calorie Requirements (kcals/day)              Protein Requirements    Weight Used For Protein Calculations       Est Protein Requirement Amount (gms/kg)       Estimated Protein Requirements (gms/day)          Fluid Requirements     Estimated Fluid Requirements (mL/day)            Fluid Deficit       Desired Sodium Level (mEq/L)      Desired Sodium Level (mEq/L)      Estimated Fluid Deficit Needs (L)           Nutrition Prescription Ordered                Nutrition Prescription PO    Current PO Diet  Regular     Supplement         Nutrition Prescription EN    Enteral Route -     TF modular -     TF Delivery Method -     Current Ordered TF  -     Current Ordered Water flush  -     TF Observation  -        Nutrition Prescription TPN    TPN Route -     Current Ordered TPN Volume  -     Dextrose (gm/kcals)  -     Amino Acid (gm/kcals) -     Lipids (ML/Concentration/Frequency)  -     TPN Observation  -          Evaluation of Received Nutrient/Fluid Intake                PO Evaluation    % PO Intake 45% x1 meal        EN Evaluation    TF Changes -     TF Residual -     TF Tolerance      Average EN Delivered  -         TPN Evaluation    Total Number of Days on TPN  -           Clinical Course      Clinical Nutrition Course Details  2/5: Regular         Problem/Interventions:                     Nutrition Diagnoses Problem 1      Problem 1   Moderate chronic illness malnutrition related to likely inadequate calorie intake with catabolic illness as evidenced by muscle/fat loss noted in physical exam and weight loss of 14% in 6 months.     Nutrition Diagnoses Problem 2      Problem 2            Intervention Goal                Intervention Goal    General Meal intake 75%         Nutrition Intervention                Nutrition Intervention    RD/Tech Action Add Boost Plus BID         Nutrition Prescription                Nutrition Prescription PO      Diet  Regular     Supplement Boost Plus BID        Nutrition Prescription EN    Enteral Prescription -        Nutrition Prescription TPN    TPN Prescription -         Monitor/Evaluation                Monitor/Evaluation    Monitor Intake, weight, labs, BM         Electronically signed by:  Kathy Rausch RD  02/05/20 2:38 PM

## 2020-02-05 NOTE — CONSULTS
Hematology/Oncology Inpatient Consultation    Patient name: Rock Rcok  : 1940  MRN: [id number]  Referring Provider: Dr. Krause  Reason for Consultation: AML    Chief complaint: left leg/foot pain    History of present illness:    79 y.o. male who presented to [facility] on 2020 reporting left lower leg and foot pain for 1.5 weeks.  The pain was getting worse and there was discoloration noted over the past 2 days.  He was transferred here for further evaluation.  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  The pain pills he had from Hospice were not controlling his pain.  White count was 169 (95% blasts), hemoglobin 10.8, platelets 43 with INR 1.26.  His left foot was mottled and cold.  Vascular surgery was consulted.  There was no motor or sensory function in his foot and it was recommended to proceed with pain control and anticoagulation.   The option of palliative amputation was given, but with significant thrombocytopenia and progression of his AML affecting wound healing, this was felt to not be an option at this time.  Palliative care was consulted.  He was started on a heparin drip.    20  Hematology/Oncology was consulted as he is an established patient who we treated for AML.  He received 1 cycle of Dacogen in 2009 resulting in pancytopenia, diarrhea and neutropenic fever.  He was hospitalized and decided to forego any further treatment and was discharged in hospice care.    PCP: Jovana Nur APRN    History:  Past Medical History:   Diagnosis Date   • Anemia    • Biclonal gammopathy     Diagnosed in    • Breast cancer (CMS/HCC)     History of breast cancer in  status post mastectomy and lymph node dissection   • Cancer of kidney (CMS/HCC)     Left kidney cancer status post nephrectomy    • Chronic kidney disease (CKD), stage III (moderate) (CMS/HCC)    • Dyslipidemia    • History of skin cancer     History of multiple skin  cancers in 1994   • Hypertension    , Past Surgical History:   Procedure Laterality Date   • MASTECTOMY      Right mastectomy   • NEPHRECTOMY      Left nephrectomy   , Family History   Problem Relation Age of Onset   • Cancer Sister    , Social History     Tobacco Use   • Smoking status: Former Smoker   • Smokeless tobacco: Never Used   Substance Use Topics   • Alcohol use: No     Frequency: Never   • Drug use: No   , Medications Prior to Admission   Medication Sig Dispense Refill Last Dose   • baclofen (LIORESAL) 10 MG tablet Take 10 mg by mouth 2 (Two) Times a Day As Needed for Muscle Spasms.      • HYDROcodone-acetaminophen (NORCO) 5-325 MG per tablet Take 1 tablet by mouth Every 8 (Eight) Hours As Needed.      • mirtazapine (REMERON) 15 MG tablet Take 15 mg by mouth Every Night.      • Potassium Chloride crystals 99 mg Daily.      • traZODone (DESYREL) 50 MG tablet Take 25 mg by mouth At Night As Needed for Sleep.      • acetaminophen (TYLENOL) 500 MG tablet Take 500 mg by mouth Every 6 (Six) Hours As Needed for Mild Pain .      • acyclovir (ZOVIRAX) 400 MG tablet Take 400 mg by mouth 2 (Two) Times a Day.      • atenolol (TENORMIN) 25 MG tablet Take 12.5 mg by mouth Daily.      • atorvastatin (LIPITOR) 40 MG tablet Take 40 mg by mouth Daily.      • clotrimazole (MYCELEX) 10 MG kaitlyn Take 10 mg by mouth 3 (Three) Times a Day As Needed (thrush).      • Cyanocobalamin 500 MCG chewable tablet Chew 500 mcg Daily.      • diphenoxylate-atropine (LOMOTIL) 2.5-0.025 MG per tablet Take 2 tablets by mouth 4 (Four) Times a Day As Needed for Diarrhea. 40 tablet 0    • levothyroxine (SYNTHROID, LEVOTHROID) 75 MCG tablet Take 75 mcg by mouth Daily.      • loperamide (IMODIUM) 2 MG capsule Take 1 capsule by mouth 4 (Four) Times a Day As Needed for Diarrhea. 40 capsule 0    • Multiple Vitamins-Minerals (MULTIVITAMIN WITH MINERALS) tablet tablet Take 1 tablet by mouth Daily.      • ondansetron ODT (ZOFRAN-ODT) 4 MG  disintegrating tablet Take 8 mg by mouth Every 8 (Eight) Hours As Needed for Nausea or Vomiting.      • sulfamethoxazole-trimethoprim (BACTRIM DS,SEPTRA DS) 800-160 MG per tablet Take 1 tablet by mouth Daily. 30 tablet 0    • Venetoclax (VENCLEXTA) 100 MG tablet Take 1 tablet by mouth on day 1. Take 2 tablets by mouth on day 2. Take 4 tablets by mouth daily starting day 3. 120 tablet 0 Unknown at Unknown time   • vitamin D (ERGOCALCIFEROL) 58821 units capsule capsule Take 50,000 Units by mouth 1 (One) Time Per Week. Unknown what day of the week patient takes medication      , Scheduled Meds:    sodium chloride 10 mL Intravenous Q12H   , Continuous Infusions:    heparin 18 Units/kg/hr Last Rate: 25.598 Units/kg/hr (02/05/20 6198)   , PRN Meds:  •  acetaminophen **OR** acetaminophen **OR** acetaminophen  •  Calcium Gluconate-NaCl **AND** calcium gluconate **AND** Calcium, Ionized  •  docusate sodium  •  heparin  •  heparin  •  HYDROmorphone  •  magnesium sulfate **OR** magnesium sulfate **OR** magnesium sulfate  •  melatonin  •  nitroglycerin  •  ondansetron **OR** ondansetron  •  oxyCODONE  •  potassium & sodium phosphates **OR** potassium & sodium phosphates  •  potassium chloride  •  potassium chloride  •  sodium chloride   Allergies:  Codeine    Subjective     ROS:  Review of Systems   Constitutional: Negative for diaphoresis, fatigue, fever and unexpected weight change.   HENT: Negative for congestion and nosebleeds.    Eyes: Negative.    Respiratory: Negative for cough and shortness of breath.    Cardiovascular: Negative for chest pain and leg swelling.   Gastrointestinal: Negative for abdominal pain, blood in stool, constipation, diarrhea, nausea and vomiting.   Endocrine: Negative for cold intolerance and heat intolerance.   Genitourinary: Negative for dysuria and hematuria.   Musculoskeletal: Negative for arthralgias and joint swelling.   Skin: Negative for rash and wound.   Neurological: Negative for  "numbness and headaches.   Hematological: Does not bruise/bleed easily.   Psychiatric/Behavioral: Negative for confusion. The patient is not nervous/anxious.    All other systems reviewed and are negative.       Objective   Vital Signs:   /78 (BP Location: Right arm, Patient Position: Lying)   Pulse 103   Temp (!) 101.4 °F (38.6 °C) (Oral)   Resp 18   Ht 188 cm (74\")   Wt 77.7 kg (171 lb 4.8 oz)   SpO2 95%   BMI 21.99 kg/m²     Physical Exam:  Physical Exam   Constitutional: He is oriented to person, place, and time. He appears well-nourished.   Frail-appearing male.  Lethargic and somewhat somnolent.   HENT:   Head: Normocephalic and atraumatic.   Mouth/Throat: Oropharynx is clear and moist.   Eyes: Pupils are equal, round, and reactive to light. Conjunctivae, EOM and lids are normal.   Neck: Normal range of motion. Neck supple. No thyromegaly present.   Cardiovascular: Normal rate, regular rhythm and normal heart sounds.   No murmur heard.  Pulmonary/Chest: Effort normal and breath sounds normal. No respiratory distress.   Abdominal: Soft. Normal appearance and bowel sounds are normal. He exhibits no distension.   Genitourinary:   Genitourinary Comments: Deferred.   Musculoskeletal: Normal range of motion. He exhibits no edema.   Somewhat cold left lower extremity.  Patient has no sensation and no motor function of the left foot.   Lymphadenopathy:     He has no cervical adenopathy.     He has no axillary adenopathy.        Right: No supraclavicular adenopathy present.        Left: No supraclavicular adenopathy present.   Neurological: He is alert and oriented to person, place, and time.   Patient appears very lethargic, somnolent.   Skin: Skin is warm and dry. Capillary refill takes less than 2 seconds. No bruising, no petechiae and no rash noted.   Psychiatric: He has a normal mood and affect. His speech is normal and behavior is normal. Judgment and thought content normal. Cognition and memory are " normal.   Nursing note and vitals reviewed.       Results Review:  Lab Results (last 48 hours)     Procedure Component Value Units Date/Time    aPTT [[id number]]  (Abnormal) Collected:  02/05/20 1405    Specimen:  Blood from Arm, Right Updated:  02/05/20 1451     PTT 50.3 seconds     Pathology Consultation [[id number]] Collected:  02/05/20 0645    Specimen:  Blood, Venous Line Updated:  02/05/20 1213     Final Diagnosis --     Severe leukocytosis composed almost entirely of blasts (pt with history of AML)  Thrombocytopenia       Case Report --     Surgical Pathology Report                         Case: [id number]                                  Authorizing Provider:  Geovany Murillo PA-C  Collected:           02/05/2020 06:45 AM          Ordering Location:     [address]    Received:            02/05/2020 09:15 AM                                 MEDICAL INPATIENT                                                            Pathologist:           Benito Silva MD                                                             Specimen:    Blood, Venous Line                                                                         Manual Differential [[id number]]  (Abnormal) Collected:  02/05/20 0645    Specimen:  Blood Updated:  02/05/20 0744     Neutrophil % 2.0 %      Lymphocyte % 3.0 %      Blasts % 95.0 %      Neutrophils Absolute 3.38 10*3/mm3      Lymphocytes Absolute 5.07 10*3/mm3      Anisocytosis Slight/1+     Poikilocytes Slight/1+     WBC Morphology Normal     Platelet Estimate Decreased    Path Consult Reflex [[id number]] Collected:  02/05/20 0645    Specimen:  Blood Updated:  02/05/20 0744     Pathology Review Yes    CBC & Differential [[id number]] Collected:  02/05/20 0645    Specimen:  Blood Updated:  02/05/20 0744    Narrative:       The following orders were created for panel order CBC & Differential.  Procedure                               Abnormality         Status                      ---------                               -----------         ------                     CBC Auto Differential[[id number]]        Abnormal            Final result                 Please view results for these tests on the individual orders.    CBC Auto Differential [[id number]]  (Abnormal) Collected:  02/05/20 0645    Specimen:  Blood Updated:  02/05/20 0744     .00 10*3/mm3      RBC 3.46 10*6/mm3      Hemoglobin 10.8 g/dL      Hematocrit 31.9 %      MCV 92.0 fL      MCH 31.0 pg      MCHC 33.8 g/dL      RDW 15.2 %      RDW-SD 49.0 fl      MPV 6.0 fL      Platelets 43 10*3/mm3     Scan Slide [[id number]] Collected:  02/05/20 0645    Specimen:  Blood Updated:  02/05/20 0744     Scan Slide --     Comment: See Manual Differential Results       Potassium [[id number]]  (Abnormal) Collected:  02/05/20 0645    Specimen:  Blood Updated:  02/05/20 0721     Potassium 3.2 mmol/L     Protime-INR [[id number]]  (Abnormal) Collected:  02/05/20 0645    Specimen:  Blood Updated:  02/05/20 0710     Protime 12.7 Seconds      INR 1.26    aPTT [[id number]]  (Abnormal) Collected:  02/05/20 0645    Specimen:  Blood Updated:  02/05/20 0710     PTT 29.4 seconds            Pending Results: none    Imaging Reviewed:   No radiology results for the last 7 days           Assessment/Plan   ASSESSMENT  1. AML-patient on hospice care with rising white count and 95% blasts.  Anemia and thrombocytopenia present.  Patient high risk for stroke and acute decline in clinical condition due to hyperleukocytosis.  2. Anemia-no need for transfusions  3. Fever-patient in hospice care.  Prolonging his life will only prolong his pain.  4. Acute left lower extremity arterial occlusion causing tissue necrosis-on heparin drip.  Hyperleukocytosis syndrome could be contributing to circulatory issues.  Vascular consulted recommending pain control and heparin drip.  Palliative care consulted for comfort.  5. CKD-creatinine stable.  6. IgG kappa MGUS- patient in  hospice care    PLAN  1. Recommend to continue heparin drip  2. Maximize pain control with help of palliative care.  3. May benefit from gabapentin in addition to narcotics.    Note initiated by ILENE Jorgensen.  Pt seen and examined by Dr. Stevens.   Electronically signed by ILENE Loja, 02/05/20, 4:01 PM.          I personally reviewed all pertinent labs, related documentation and the  imaging. ROS performed and physical exam done during face to face enounter with patient. I agree with  nurse practitioner's doumentation, assessment and plan. No new changes.  Exam reveals very lethargic elderly male.  He is somnolent at the time of my exam having receiving pain medication.  Reviewed counts he has very high counts with blasts.  Also thrombocytopenic due to bone marrow failure.  Presented with acute left lower extremity ischemia.    Hyperleukocytosis is likely contributing to this and also possibly even thrombosis due to malignancy..  Currently on hospice care.  Not a candidate for treatment.  Continue pain control.  Agree with using heparin, but there is definitely risk of bleeding.  May have to hold off heparin if counts drop to less than 20,000.      We will follow.  Continue pain control.

## 2020-02-05 NOTE — PROGRESS NOTES
Case Management/Social Work    Patient Name:  Rock Rock  YOB: 1940  MRN: [id number]  Admit Date:  2/5/2020        Sw met with spouse and she reported pt is current at home with [facility].Shannon contacted Constanza/liaison for [facility] and informed her pt was admitted to hospital,she stated a Rn would be out 2/5 to meet with pt.    No social barrier to dc identified      Electronically signed by:  Irma Arias  02/05/20 2:50 PM   [phone]

## 2020-02-06 PROBLEM — N18.30 CHRONIC KIDNEY DISEASE, STAGE III (MODERATE) (HCC): Chronic | Status: ACTIVE | Noted: 2020-01-01

## 2020-02-06 PROBLEM — R15.9 INCONTINENCE OF FECES: Status: RESOLVED | Noted: 2019-01-01 | Resolved: 2020-01-01

## 2020-02-06 PROBLEM — D61.810 PANCYTOPENIA DUE TO CHEMOTHERAPY (HCC): Status: RESOLVED | Noted: 2019-01-01 | Resolved: 2020-01-01

## 2020-02-06 PROBLEM — D70.9 NEUTROPENIA (HCC): Status: RESOLVED | Noted: 2019-01-01 | Resolved: 2020-01-01

## 2020-02-06 PROBLEM — R79.89 ELEVATED SERUM CREATININE: Status: RESOLVED | Noted: 2019-01-01 | Resolved: 2020-01-01

## 2020-02-06 PROBLEM — Z51.11 ENCOUNTER FOR ANTINEOPLASTIC CHEMOTHERAPY: Status: RESOLVED | Noted: 2019-01-01 | Resolved: 2020-01-01

## 2020-02-06 PROBLEM — D70.8 OTHER NEUTROPENIA (HCC): Status: RESOLVED | Noted: 2019-01-01 | Resolved: 2020-01-01

## 2020-02-06 PROBLEM — R53.1 WEAKNESS: Status: RESOLVED | Noted: 2019-01-01 | Resolved: 2020-01-01

## 2020-02-06 PROBLEM — B37.0 ORAL THRUSH: Status: RESOLVED | Noted: 2019-01-01 | Resolved: 2020-01-01

## 2020-02-06 PROBLEM — D69.6 THROMBOCYTOPENIA (HCC): Status: RESOLVED | Noted: 2019-01-01 | Resolved: 2020-01-01

## 2020-02-06 PROBLEM — R21 RASH OF PERINEUM: Status: RESOLVED | Noted: 2019-01-01 | Resolved: 2020-01-01

## 2020-02-06 PROBLEM — R19.7 DIARRHEA: Status: RESOLVED | Noted: 2019-01-01 | Resolved: 2020-01-01

## 2020-02-06 PROBLEM — B00.2 ORAL HERPES: Status: RESOLVED | Noted: 2019-01-01 | Resolved: 2020-01-01

## 2020-02-06 NOTE — NURSING NOTE
Spoke with wife- she called the nurses station. I gave her an update on the patient. I informed her the [facility]. Sx has been trying to reach her and she said she will call MD Krause for more information. I asked her and went over code status with the spouse and she made it very clear that she does want him to be a full code.    She said she will try to come up here and visit later today.

## 2020-02-06 NOTE — NURSING NOTE
Placed call to number listed in chart as the spouse. A voicemail was reached. Message left to call nurses station back.

## 2020-02-06 NOTE — PLAN OF CARE
Problem: Patient Care Overview  Goal: Plan of Care Review  Outcome: Ongoing (interventions implemented as appropriate)  Flowsheets  Taken 2/6/2020 3824  Progress: no change  Outcome Summary: Patient has not slept well through the night. Has complained of pain in his LLE. Pain medication administered per patient request. 2Q turns to prevent skin breakdown. Heparin drip infusing.  Taken 2/5/2020 1901  Plan of Care Reviewed With: patient

## 2020-02-06 NOTE — PROGRESS NOTES
Hematology/Oncology Inpatient Progress Note    PATIENT NAME: Rock Rock  : 1940  MRN: [id number]    CHIEF COMPLAINT: AML    HISTORY OF PRESENT ILLNESS:  79 y.o. male who presented to [facility] on 2020 reporting left lower leg and foot pain for 1.5 weeks.  The pain was getting worse and there was discoloration noted over the past 2 days.  He was transferred here for further evaluation.  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  The pain pills he had from Hospice were not controlling his pain.  White count was 169 (95% blasts), hemoglobin 10.8, platelets 43 with INR 1.26.  His left foot was mottled and cold.  Vascular surgery was consulted.  There was no motor or sensory function in his foot and it was recommended to proceed with pain control and anticoagulation.   The option of palliative amputation was given, but with significant thrombocytopenia and progression of his AML affecting wound healing, this was felt to not be an option at this time.  Palliative care was consulted.  He was started on a heparin drip.     20  Hematology/Oncology was consulted as he is an established patient who we treated for AML.  He received 1 cycle of Dacogen in 2009 resulting in pancytopenia, diarrhea and neutropenic fever.  He was hospitalized and decided to forego any further treatment and was discharged in hospice care.     PCP: Jovana Nur APRN    · 2020 palliative care consult completed.  Trying to establish family contact to assist patient in making decision regarding left leg amputation versus continued hospice care.    History of present illness reviewed and made noted changes since prior visit.    Subjective Patient was sleeping at the time of my exam.  Due to being very uncomfortable all day with pain, I decided not to wake him up.    ROS:  Review of Systems   Constitutional: Negative for diaphoresis, fatigue, fever and unexpected weight change.  "  HENT: Negative for congestion and nosebleeds.    Eyes: Negative.    Respiratory: Negative for cough and shortness of breath.    Cardiovascular: Negative for chest pain and leg swelling.   Gastrointestinal: Negative for abdominal pain, blood in stool, constipation, diarrhea, nausea and vomiting.   Endocrine: Negative for cold intolerance and heat intolerance.   Genitourinary: Negative for dysuria and hematuria.   Musculoskeletal: Negative for arthralgias and joint swelling.   Skin: Negative for rash and wound.   Neurological: Negative for numbness and headaches.   Hematological: Does not bruise/bleed easily.   Psychiatric/Behavioral: Negative for confusion. The patient is not nervous/anxious.    All other systems reviewed and are negative.       MEDICATIONS:    Scheduled Meds:      fentaNYL 1 patch Transdermal Q72H   And      [START ON 2/7/2020] Check Fentanyl Patch Placement 1 each Does not apply Q12H   sodium chloride 10 mL Intravenous Q12H      Continuous Infusions:      heparin 18 Units/kg/hr Last Rate: 21.89 Units/kg/hr (02/06/20 0533)      PRN Meds:  •  acetaminophen **OR** acetaminophen **OR** acetaminophen  •  Calcium Gluconate-NaCl **AND** calcium gluconate **AND** Calcium, Ionized  •  docusate sodium  •  heparin  •  heparin  •  HYDROmorphone  •  magnesium sulfate **OR** magnesium sulfate **OR** magnesium sulfate  •  melatonin  •  nitroglycerin  •  ondansetron **OR** ondansetron  •  oxyCODONE  •  potassium & sodium phosphates **OR** potassium & sodium phosphates  •  potassium chloride  •  potassium chloride  •  sodium chloride     ALLERGIES:    Allergies   Allergen Reactions   • Codeine Itching       Objective    VITALS:   /81 (BP Location: Right arm, Patient Position: Lying)   Pulse 114   Temp 97.9 °F (36.6 °C) (Oral)   Resp 18   Ht 188 cm (74\")   Wt 77.2 kg (170 lb 1.6 oz)   SpO2 95%   BMI 21.84 kg/m²     PHYSICAL EXAM:  Physical Exam   Constitutional: He is oriented to person, place, and " time. He appears well-developed and well-nourished.   HENT:   Head: Normocephalic and atraumatic.   Mouth/Throat: Oropharynx is clear and moist.   Conjunctival pallor   Eyes: Pupils are equal, round, and reactive to light. Conjunctivae, EOM and lids are normal.   Neck: Normal range of motion. Neck supple. No thyromegaly present.   Cardiovascular: Normal rate, regular rhythm and normal heart sounds.   No murmur heard.  Pulmonary/Chest: Effort normal and breath sounds normal. No respiratory distress.   Abdominal: Soft. Normal appearance and bowel sounds are normal. He exhibits no distension.   Genitourinary:   Genitourinary Comments: Deferred.   Musculoskeletal: Normal range of motion. He exhibits no edema.   Mottling and erythema noted on the left foot and the lower left leg.     Lymphadenopathy:     He has no cervical adenopathy.     He has no axillary adenopathy.        Right: No supraclavicular adenopathy present.        Left: No supraclavicular adenopathy present.   Neurological: He is alert and oriented to person, place, and time.   Skin: Skin is warm and dry. Capillary refill takes less than 2 seconds. No bruising, no petechiae and no rash noted.   Psychiatric: He has a normal mood and affect. His speech is normal and behavior is normal. Judgment and thought content normal. Cognition and memory are normal.   Nursing note and vitals reviewed.        RECENT LABS:  Lab Results (last 24 hours)     Procedure Component Value Units Date/Time    aPTT [[id number]]  (Normal) Collected:  02/06/20 1243    Specimen:  Blood Updated:  02/06/20 1307     PTT 74.2 seconds     Scan Slide [[id number]] Collected:  02/06/20 0326    Specimen:  Blood Updated:  02/06/20 0712     Scan Slide --     Comment: See Manual Differential Results       Manual Differential [[id number]]  (Abnormal) Collected:  02/06/20 0326    Specimen:  Blood Updated:  02/06/20 0712     Neutrophil % 1.0 %      Lymphocyte % 3.0 %      Monocyte % 8.0 %      Myelocyte  % 9.0 %      Atypical Lymphocyte % 7.0 %      Blasts % 72.0 %      Neutrophils Absolute 1.72 10*3/mm3      Lymphocytes Absolute 5.17 10*3/mm3      Monocytes Absolute 13.78 10*3/mm3      RBC Morphology Normal     WBC Morphology Normal     Platelet Morphology Normal    Narrative:       Reviewed by Pathologist within the past 30 days on [id number] .      CBC Auto Differential [[id number]]  (Abnormal) Collected:  02/06/20 0326    Specimen:  Blood Updated:  02/06/20 0712     .20 10*3/mm3      RBC 3.25 10*6/mm3      Hemoglobin 10.2 g/dL      Hematocrit 30.2 %      MCV 92.9 fL      MCH 31.4 pg      MCHC 33.8 g/dL      RDW 15.3 %      RDW-SD 49.9 fl      MPV 6.5 fL      Platelets 36 10*3/mm3     Troponin [[id number]]  (Abnormal) Collected:  02/06/20 0326    Specimen:  Blood Updated:  02/06/20 0536     Troponin T 0.064 ng/mL     Narrative:       Troponin T Reference Range:  <= 0.03 ng/mL-   Negative for AMI  >0.03 ng/mL-     Abnormal for myocardial necrosis.  Clinicians would have to utilize clinical acumen, EKG, Troponin and serial changes to determine if it is an Acute Myocardial Infarction or myocardial injury due to an underlying chronic condition.       Results may be falsely decreased if patient taking Biotin.      Comprehensive Metabolic Panel [[id number]]  (Abnormal) Collected:  02/06/20 0326    Specimen:  Blood Updated:  02/06/20 0525     Glucose 146 mg/dL      BUN 23 mg/dL      Creatinine 1.90 mg/dL      Sodium 137 mmol/L      Potassium 4.0 mmol/L      Chloride 97 mmol/L      CO2 23.0 mmol/L      Calcium 8.7 mg/dL      Total Protein 7.0 g/dL      Albumin 3.20 g/dL      ALT (SGPT) 13 U/L      AST (SGOT) 34 U/L      Alkaline Phosphatase 80 U/L      Total Bilirubin 0.4 mg/dL      eGFR Non African Amer 34 mL/min/1.73      Globulin 3.8 gm/dL      A/G Ratio 0.8 g/dL      BUN/Creatinine Ratio 12.1     Anion Gap 17.0 mmol/L     Narrative:       GFR Normal >60  Chronic Kidney Disease <60  Kidney Failure <15       Phosphorus [[id number]]  (Normal) Collected:  02/06/20 0326    Specimen:  Blood Updated:  02/06/20 0525     Phosphorus 4.4 mg/dL     Magnesium [[id number]]  (Abnormal) Collected:  02/06/20 0326    Specimen:  Blood Updated:  02/06/20 0525     Magnesium 2.5 mg/dL     aPTT [[id number]]  (Normal) Collected:  02/06/20 0326    Specimen:  Blood Updated:  02/06/20 0518     PTT 71.2 seconds      Comment: Result checked        aPTT [[id number]]  (Abnormal) Collected:  02/05/20 2108    Specimen:  Blood from Arm, Right Updated:  02/05/20 2135     PTT 52.8 seconds     Potassium [[id number]]  (Normal) Collected:  02/05/20 1809    Specimen:  Blood Updated:  02/05/20 1914     Potassium 4.0 mmol/L     aPTT [[id number]]  (Abnormal) Collected:  02/05/20 1405    Specimen:  Blood from Arm, Right Updated:  02/05/20 1451     PTT 50.3 seconds           PENDING RESULTS: none    IMAGING REVIEWED:  No radiology results for the last day    Assessment/Plan   ASSESSMENT:  1.   AML -anemia and thrombocytopenia present.  Patient high risk for stroke and acute decline in clinical condition due to hyperleukocytosis.  2. Anemia-no need for transfusions.  Stable.  3. Thrombocytopenia-stable.  4. Fever-patient in hospice care.  Prolonging his life will only prolong his pain.  Febrile.  5. Acute left lower extremity arterial occlusion causing tissue necrosis-on heparin drip.  Hyperleukocytosis syndrome could be contributing to circulatory issues.  Vascular consulted recommending pain control and heparin drip.  Palliative care consulted for comfort.  6. CKD-creatinine trending higher.  No evidence of dehydration.  Possibly due to severe leukocytosis and tumor lysis syndrome.  7. 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 Recommend to continue heparin drip  2. Maximize pain control with help of palliative care.  3. May benefit from gabapentin in addition to narcotics.  4. Monitor for bleeding or falling hemoglobin.     Electronically signed by Cassi  ILENE Holder, 02/06/20, 2:48 PM.             I personally reviewed all pertinent labs, related documentation and the  imaging. ROS performed and physical exam done during face to face enounter with patient. I agree with  nurse practitioner's doumentation, assessment and plan.  Patient resting comfortably today at the time of my exam after receiving fentanyl and Dilaudid.    Due to AML I feel he is not a candidate for amputation.  His thrombosis is due to hyperleukocytosis from the leukemic cells/leukemic burden.  Also hypercoagulable state from malignancy also contributing.  May continue heparin drip.  Do not think anything much can be done.  But unfortunately we will need to keep the patient comfortable at this time by aggressive pain management.  Hydration may help.  Also recommended starting gabapentin.

## 2020-02-06 NOTE — NURSING NOTE
Spoke with MD Farias at nurses station regarding patients pain level and that patient is waking up and crying out in pain. He placed new pain patch order in response in effort to control the patients pain.

## 2020-02-06 NOTE — PROGRESS NOTES
Name: Rock Rock ADMIT: 2020   : 1940  PCP: Jovana Nur APRN    MRN: [id number] LOS: 1 days   AGE/SEX: 79 y.o. male  ROOM:  [facility] 303/1     CC: Left foot pain  Interval History: Patient says the pain medication did help last night but he did not rest well.  Subjective   Review of Systems    Objective     Vital Signs  Temp:  [97.9 °F (36.6 °C)-101.4 °F (38.6 °C)] 97.9 °F (36.6 °C)  Heart Rate:  [] 114  Resp:  [16-20] 18  BP: (126-170)/(75-94) 130/81    No intake/output data recorded.    Physical Exam  Vascular: Left foot is much colder and has much more pronounced cyanosis.  His calf is actually warm.    Data Reviewed:  CBC    Results from last 7 days   Lab Units 20  0326 20  0645   WBC 10*3/mm3 172.20* 169.00*   HEMOGLOBIN g/dL 10.2* 10.8*   PLATELETS 10*3/mm3 36* 43*     BMP   Results from last 7 days   Lab Units 20  0326 20  1809 20  0645   SODIUM mmol/L 137  --   --    POTASSIUM mmol/L 4.0 4.0 3.2*   CHLORIDE mmol/L 97*  --   --    CO2 mmol/L 23.0  --   --    BUN mg/dL 23  --   --    CREATININE mg/dL 1.90*  --   --    GLUCOSE mg/dL 146*  --   --    MAGNESIUM mg/dL 2.5*  --   --    PHOSPHORUS mg/dL 4.4  --   --      Coag   Results from last 7 days   Lab Units 20  0326 20  2108 20  1405 20  0645   INR   --   --   --  1.26*   APTT seconds 71.2 52.8* 50.3* 29.4*     HbA1C No results found for: HGBA1C  Radiology(recent) No radiology results for the last day    Imaging Reviewed: None    Active Hospital Problems    Diagnosis  POA   • **Arterial occlusion [I70.90]  Yes   • Dementia (CMS/HCC) [F03.90]  Yes   • Hyperlipidemia [E78.5]  Yes   • Hypokalemia [E87.6]  Yes   • 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 (CMS/HCC) [C92.00]  Yes   • Anemia [D64.9]  Yes   • Hypothyroidism [E03.9]  Yes   • Hypertension [I10]  Yes      Resolved Hospital Problems   No resolved problems to display.      Assessment/Plan   [place] [place] [facility]  [facility]    Impression/Plan:  Ischemic left foot with no motor or neuro sensation: Patient is currently in hospice care for AML.  Multiple reasons for arterial thrombotic episodes including his white blood cell count of greater than 160.  I attempted to call his wife, listed as emergency contact, at the number provided on the face sheet.  No answer and no name on the answering machine so I did not leave a message.  The only thing I could potentially offer this gentleman is a  major amputation.  He will be at continued risk for new thrombotic episodes as the underlying etiology is not being treated.    Dave Krause MD  02/06/20  8:29 AM  Office Number [phone]

## 2020-02-06 NOTE — PROGRESS NOTES
"      [facility] Medicine Services Daily Progress Note      Hospitalist Team  LOS 1 days      Patient Care Team:  Jovana Nur APRN as PCP - General (Family Medicine)    Patient Location: 303/1      Subjective   Subjective     Chief Complaint / Subjective  No chief complaint on file.        Brief Synopsis of Hospital Course/HPI  Mr. Rock is a 79 y.o. male with past medical history of dementia, hypertension, hypothyroidism, Hyperlipidemia and leukemia who presents to [facility] complaining of pain in left calf and foot.  ED provider from sending facility notes patient presented via EMS complaining of pain and discoloration of his foot for the past 2 days.  Patient is A&O x1 and is a generally poor historian with frequent tangential speech.  He does note his pain began on/2/2020 as a cramping sensation.  He states it has continued to get worse in spite of pain pill provided by his hospice provider.        At the sending facility patient found to have labs significant for potassium: 3.2, glucose: 208, creatinine: 1.96, WBCs: 179.5, hemoglobin: 11.1       Review of Systems   Unable to perform ROS: severity of pain     2/6/2020: Patient seen and examined and patient is essentially sleeping.  I spoke to nurse at the bedside in detail and we start patient on fentanyl patch because of his left lower extremity pain.    Objective   Objective      Vital Signs  Temp:  [97.8 °F (36.6 °C)-98.3 °F (36.8 °C)] 97.8 °F (36.6 °C)  Heart Rate:  [101-114] 110  Resp:  [16-19] 19  BP: (130-160)/(81-94) 138/84  Oxygen Therapy  SpO2: 96 %  Pulse Oximetry Type: Intermittent  Device (Oxygen Therapy): room air  Flowsheet Rows      First Filed Value   Admission Height  188 cm (74\") Documented at 02/05/2020 0600   Admission Weight  77.7 kg (171 lb 4.8 oz) Documented at 02/05/2020 0600        Intake & Output (last 3 days)       02/03 0701 - 02/04 0700 02/04 0701 - 02/05 0700 02/05 0701 - 02/06 0700 02/06 0701 - " 02/07 0700    P.O.  120 1140 0    Total Intake(mL/kg)  120 (1.5) 1140 (14.8) 0 (0)    Urine (mL/kg/hr)  0      Stool  0      Total Output  0      Net  +120 +1140 0            Urine Unmeasured Occurrence  1 x 8 x     Stool Unmeasured Occurrence   2 x         Lines, Drains & Airways    Active LDAs     Name:   Placement date:   Placement time:   Site:   Days:    Peripheral IV 02/05/20 0744 Left Arm   02/05/20 0744    Arm   1                  Physical Exam:    Physical Exam   HENT:   Head: Normocephalic and atraumatic.   Eyes: Conjunctivae are normal.   Neck: Normal range of motion. Neck supple.   Cardiovascular: Normal rate and regular rhythm.   Pulmonary/Chest: Effort normal and breath sounds normal.   Abdominal: Soft. Bowel sounds are normal.   Musculoskeletal: Normal range of motion.   Skin: Skin is warm.   Nursing note and vitals reviewed.    Procedures:    Results Review:     I reviewed the patient's new clinical results.      Lab Results (last 24 hours)     Procedure Component Value Units Date/Time    aPTT [[id number]]  (Normal) Collected:  02/06/20 1243    Specimen:  Blood Updated:  02/06/20 1307     PTT 74.2 seconds     Scan Slide [[id number]] Collected:  02/06/20 0326    Specimen:  Blood Updated:  02/06/20 0712     Scan Slide --     Comment: See Manual Differential Results       Manual Differential [[id number]]  (Abnormal) Collected:  02/06/20 0326    Specimen:  Blood Updated:  02/06/20 0712     Neutrophil % 1.0 %      Lymphocyte % 3.0 %      Monocyte % 8.0 %      Myelocyte % 9.0 %      Atypical Lymphocyte % 7.0 %      Blasts % 72.0 %      Neutrophils Absolute 1.72 10*3/mm3      Lymphocytes Absolute 5.17 10*3/mm3      Monocytes Absolute 13.78 10*3/mm3      RBC Morphology Normal     WBC Morphology Normal     Platelet Morphology Normal    Narrative:       Reviewed by Pathologist within the past 30 days on [id number] .      CBC Auto Differential [[id number]]  (Abnormal) Collected:  02/06/20 0326    Specimen:  Blood  Updated:  02/06/20 0712     .20 10*3/mm3      RBC 3.25 10*6/mm3      Hemoglobin 10.2 g/dL      Hematocrit 30.2 %      MCV 92.9 fL      MCH 31.4 pg      MCHC 33.8 g/dL      RDW 15.3 %      RDW-SD 49.9 fl      MPV 6.5 fL      Platelets 36 10*3/mm3     Troponin [[id number]]  (Abnormal) Collected:  02/06/20 0326    Specimen:  Blood Updated:  02/06/20 0536     Troponin T 0.064 ng/mL     Narrative:       Troponin T Reference Range:  <= 0.03 ng/mL-   Negative for AMI  >0.03 ng/mL-     Abnormal for myocardial necrosis.  Clinicians would have to utilize clinical acumen, EKG, Troponin and serial changes to determine if it is an Acute Myocardial Infarction or myocardial injury due to an underlying chronic condition.       Results may be falsely decreased if patient taking Biotin.      Comprehensive Metabolic Panel [[id number]]  (Abnormal) Collected:  02/06/20 0326    Specimen:  Blood Updated:  02/06/20 0525     Glucose 146 mg/dL      BUN 23 mg/dL      Creatinine 1.90 mg/dL      Sodium 137 mmol/L      Potassium 4.0 mmol/L      Chloride 97 mmol/L      CO2 23.0 mmol/L      Calcium 8.7 mg/dL      Total Protein 7.0 g/dL      Albumin 3.20 g/dL      ALT (SGPT) 13 U/L      AST (SGOT) 34 U/L      Alkaline Phosphatase 80 U/L      Total Bilirubin 0.4 mg/dL      eGFR Non African Amer 34 mL/min/1.73      Globulin 3.8 gm/dL      A/G Ratio 0.8 g/dL      BUN/Creatinine Ratio 12.1     Anion Gap 17.0 mmol/L     Narrative:       GFR Normal >60  Chronic Kidney Disease <60  Kidney Failure <15      Phosphorus [[id number]]  (Normal) Collected:  02/06/20 0326    Specimen:  Blood Updated:  02/06/20 0525     Phosphorus 4.4 mg/dL     Magnesium [[id number]]  (Abnormal) Collected:  02/06/20 0326    Specimen:  Blood Updated:  02/06/20 0525     Magnesium 2.5 mg/dL     aPTT [[id number]]  (Normal) Collected:  02/06/20 0326    Specimen:  Blood Updated:  02/06/20 0518     PTT 71.2 seconds      Comment: Result checked        aPTT [[id number]]  (Abnormal)  Collected:  02/05/20 2108    Specimen:  Blood from Arm, Right Updated:  02/05/20 2135     PTT 52.8 seconds     Potassium [[id number]]  (Normal) Collected:  02/05/20 1809    Specimen:  Blood Updated:  02/05/20 1914     Potassium 4.0 mmol/L         No results found for: HGBA1C  Results from last 7 days   Lab Units 02/05/20  0645   INR  1.26*           No results found for: LIPASE  No results found for: CHOL, CHLPL, TRIG, HDL, LDL, LDLDIRECT    Lab Results   Lab Value Date/Time    FINALDX  02/05/2020 0645     Severe leukocytosis composed almost entirely of blasts (pt with history of AML)  Thrombocytopenia      FINALDX  09/04/2019 2027     Pancytopenia.         Microbiology Results (last 10 days)     ** No results found for the last 240 hours. **          ECG/EMG Results (most recent)     None                    No radiology results for the last 7 days        Xrays, labs reviewed personally by physician.    Medication Review:   I have reviewed the patient's current medication list      Scheduled Meds    fentaNYL 1 patch Transdermal Q72H   And      [START ON 2/7/2020] Check Fentanyl Patch Placement 1 each Does not apply Q12H   sodium chloride 10 mL Intravenous Q12H       Meds Infusions    heparin 18 Units/kg/hr Last Rate: 21.89 Units/kg/hr (02/06/20 0533)       Meds PRN  •  acetaminophen **OR** acetaminophen **OR** acetaminophen  •  Calcium Gluconate-NaCl **AND** calcium gluconate **AND** Calcium, Ionized  •  docusate sodium  •  heparin  •  heparin  •  HYDROmorphone  •  magnesium sulfate **OR** magnesium sulfate **OR** magnesium sulfate  •  melatonin  •  nitroglycerin  •  ondansetron **OR** ondansetron  •  oxyCODONE  •  potassium & sodium phosphates **OR** potassium & sodium phosphates  •  potassium chloride  •  potassium chloride  •  sodium chloride        Assessment/Plan   Assessment/Plan     Active Hospital Problems    Diagnosis  POA   • **Arterial occlusion [I70.90]  Yes     Priority: High   • Acute myeloid leukemia  in adult (CMS/[facility]) [C92.00]  Yes     Priority: High   • Chronic kidney disease, stage III (moderate) (CMS/HCC) [N18.3]  Yes     Priority: Medium   • Dementia (CMS/[facility]) [F03.90]  Yes     Priority: Medium   • Hypokalemia [E87.6]  Yes     Priority: Medium   • Hyperlipidemia [E78.5]  Yes     Priority: Low   • Hypothyroidism [E03.9]  Yes     Priority: Low   • Hypertension [I10]  Yes     Priority: Low   • Osteoarthritis of hip [M16.9]  Yes     Priority: Low   • Anemia [D64.9]  Yes      Resolved Hospital Problems   No resolved problems to display.       MEDICAL DECISION MAKING COMPLEXITY BY PROBLEM:     Left lower extremity acute ischemic limb  -Patient is on heparin drip  -Has been seen by vascular and they have advised conservative management  -Patient is on IV narcotic pain medications for control and also adding fentanyl patch for the pain control.  -Patient has poor prognosis and will be seen by palliative care plan possible hospice      Acute myeloid leukemia  -Patient being seen by oncology  -Poor prognosis    Chronic kidney disease stage III  -Monitor renal functions    VTE Prophylaxis -   Mechanical Order History:     None      Pharmalogical Order History:     Ordered     Dose Route Frequency Stop    02/05/20 0635  heparin 70294 units/250 ml (100 units/ml) in D5W  13.98 mL/hr      18 Units/kg/hr IV Titrated --    02/05/20 0635  heparin bolus from bag 3,100 Units      40 Units/kg IV Every 6 Hours PRN --    02/05/20 0635  heparin bolus from bag 6,200 Units      80 Units/kg IV Every 6 Hours PRN --            Code Status -   Code Status and Medical Interventions:   Ordered at: 02/05/20 0634     Level Of Support Discussed With:    Patient     Code Status:    CPR     Medical Interventions (Level of Support Prior to Arrest):    Full       Discharge Planning          Destination      Coordination has not been started for this encounter.      Durable Medical Equipment      Coordination has not been started for this  encounter.      Dialysis/Infusion      Coordination has not been started for this encounter.      Home Medical Care      Coordination has not been started for this encounter.      Therapy      Coordination has not been started for this encounter.      Community Resources      Coordination has not been started for this encounter.            Electronically signed by Wilber Farias MD, 02/06/20, 5:39 PM.  [facility]ist Team

## 2020-02-06 NOTE — CONSULTS
Palliative Care Consultation    Patient Code Status    Code Status and Medical Interventions:   Ordered at: 02/05/20 0634     Level Of Support Discussed With:    Patient     Code Status:    CPR     Medical Interventions (Level of Support Prior to Arrest):    Full       Requesting clinician:  Consulting Physician(s)     Provider Relationship Specialty    Sergio Stevens MD Consulting Physician Hematology and Oncology    Dave Krause MD Consulting Physician Vascular Surgery        Reason for consult: Consultation for clarification of goals of care and code status.      Chief Complaint    Left leg pain    History of Present Illness    [place] is a 79 y.o. male who presented to [facility] ED 2/5/20 with complaints of left leg and left foot pain. He is currently with [facility] for for AML since previous Oct 2019 but noted wife revoked services prior to admission to [facility].  Patient is confused and crying in pain every few minutes. He is reading the menu for lunch but states it is a letter from his wife telling him not to remove any body parts.  Oncology consulted and following due to anemia, thrombocytopenia with WBC's 169, hyperleukocytosis. Placed on heparin gtt due to ischemic left leg and vascular surgery consulted.  Dr Krause, vascular surgeon attempted to contact spouse but no answer.  Major amputation offered but awaiting family input.  Patient in severe pain but also obvious dementia and doesn't remember getting pain medication.   Asking for his wife.  Emotional support provided.  Discussed with nurse, await wife arrival, notify [facility]           Advanced Care Planning    Advanced Directives: Patient has advance directive, copy requested  Health Care Directive on file: No  Health Care Surrogate:      Comments: living will documented at being in the EMR but not able to locate    Assessment/Plan   Left leg pain / poorly controlled  Ischemic left leg / arterial occlusion-heparin  gtt  anemia  hyperleukocytosis syndrome  Chronic kidney disese  AML under [facility] / services revoked  Dementia / confusion  Hypothyroidism  Hypertension  Hyperlipidemia      Principal Problem:    Arterial occlusion  Active Problems:    Hypertension    Hypothyroidism    Anemia    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 (CMS/HCC)    Hypokalemia    Dementia (CMS/[facility])    Hyperlipidemia      Plan    Awaiting further conversation with spouse regarding possible amputation vs resuming hospice care.    Review of Systems   Unable to perform ROS: Dementia         Past Medical History:   Diagnosis Date   • Anemia    • Biclonal gammopathy     Diagnosed in 2011   • Breast cancer (CMS/[facility])     History of breast cancer in 1991 status post mastectomy and lymph node dissection   • Cancer of kidney (CMS/[facility])     Left kidney cancer status post nephrectomy 2006   • Chronic kidney disease (CKD), stage III (moderate) (CMS/[facility])    • Dyslipidemia    • History of skin cancer     History of multiple skin cancers in 1994   • Hypertension      Past Surgical History:   Procedure Laterality Date   • MASTECTOMY      Right mastectomy   • NEPHRECTOMY      Left nephrectomy     Family History   Problem Relation Age of Onset   • Cancer Sister      Social History     Tobacco Use   • Smoking status: Former Smoker   • Smokeless tobacco: Never Used   Substance Use Topics   • Alcohol use: No     Frequency: Never   • Drug use: No     Medications Prior to Admission   Medication Sig Dispense Refill Last Dose   • baclofen (LIORESAL) 10 MG tablet Take 10 mg by mouth 2 (Two) Times a Day As Needed for Muscle Spasms.      • HYDROcodone-acetaminophen (NORCO) 5-325 MG per tablet Take 1 tablet by mouth Every 8 (Eight) Hours As Needed.      • mirtazapine (REMERON) 15 MG tablet Take 15 mg by mouth Every Night.      • Potassium Chloride crystals 99 mg Daily.      • traZODone (DESYREL) 50 MG tablet Take 25 mg by mouth At Night As Needed for Sleep.      • acetaminophen  (TYLENOL) 500 MG tablet Take 500 mg by mouth Every 6 (Six) Hours As Needed for Mild Pain .      • acyclovir (ZOVIRAX) 400 MG tablet Take 400 mg by mouth 2 (Two) Times a Day.      • atenolol (TENORMIN) 25 MG tablet Take 12.5 mg by mouth Daily.      • atorvastatin (LIPITOR) 40 MG tablet Take 40 mg by mouth Daily.      • clotrimazole (MYCELEX) 10 MG kaitlyn Take 10 mg by mouth 3 (Three) Times a Day As Needed (thrush).      • Cyanocobalamin 500 MCG chewable tablet Chew 500 mcg Daily.      • diphenoxylate-atropine (LOMOTIL) 2.5-0.025 MG per tablet Take 2 tablets by mouth 4 (Four) Times a Day As Needed for Diarrhea. 40 tablet 0    • levothyroxine (SYNTHROID, LEVOTHROID) 75 MCG tablet Take 75 mcg by mouth Daily.      • loperamide (IMODIUM) 2 MG capsule Take 1 capsule by mouth 4 (Four) Times a Day As Needed for Diarrhea. 40 capsule 0    • Multiple Vitamins-Minerals (MULTIVITAMIN WITH MINERALS) tablet tablet Take 1 tablet by mouth Daily.      • ondansetron ODT (ZOFRAN-ODT) 4 MG disintegrating tablet Take 8 mg by mouth Every 8 (Eight) Hours As Needed for Nausea or Vomiting.      • sulfamethoxazole-trimethoprim (BACTRIM DS,SEPTRA DS) 800-160 MG per tablet Take 1 tablet by mouth Daily. 30 tablet 0    • Venetoclax (VENCLEXTA) 100 MG tablet Take 1 tablet by mouth on day 1. Take 2 tablets by mouth on day 2. Take 4 tablets by mouth daily starting day 3. 120 tablet 0 Unknown at Unknown time   • vitamin D (ERGOCALCIFEROL) 21626 units capsule capsule Take 50,000 Units by mouth 1 (One) Time Per Week. Unknown what day of the week patient takes medication          Allergies  Codeine    Scheduled Meds:    sodium chloride 10 mL Intravenous Q12H     Continuous Infusions:    heparin 18 Units/kg/hr Last Rate: 21.89 Units/kg/hr (02/06/20 0543)     PRN Meds:  •  acetaminophen **OR** acetaminophen **OR** acetaminophen  •  Calcium Gluconate-NaCl **AND** calcium gluconate **AND** Calcium, Ionized  •  docusate sodium  •  heparin  •  heparin  •   HYDROmorphone  •  magnesium sulfate **OR** magnesium sulfate **OR** magnesium sulfate  •  melatonin  •  nitroglycerin  •  ondansetron **OR** ondansetron  •  oxyCODONE  •  potassium & sodium phosphates **OR** potassium & sodium phosphates  •  potassium chloride  •  potassium chloride  •  sodium chloride    Lab Results (last 24 hours)     Procedure Component Value Units Date/Time    Scan Slide [[id number]] Collected:  02/06/20 0326    Specimen:  Blood Updated:  02/06/20 0712     Scan Slide --     Comment: See Manual Differential Results       Manual Differential [[id number]]  (Abnormal) Collected:  02/06/20 0326    Specimen:  Blood Updated:  02/06/20 0712     Neutrophil % 1.0 %      Lymphocyte % 3.0 %      Monocyte % 8.0 %      Myelocyte % 9.0 %      Atypical Lymphocyte % 7.0 %      Blasts % 72.0 %      Neutrophils Absolute 1.72 10*3/mm3      Lymphocytes Absolute 5.17 10*3/mm3      Monocytes Absolute 13.78 10*3/mm3      RBC Morphology Normal     WBC Morphology Normal     Platelet Morphology Normal    Narrative:       Reviewed by Pathologist within the past 30 days on [id number] .      CBC Auto Differential [[id number]]  (Abnormal) Collected:  02/06/20 0326    Specimen:  Blood Updated:  02/06/20 0712     .20 10*3/mm3      RBC 3.25 10*6/mm3      Hemoglobin 10.2 g/dL      Hematocrit 30.2 %      MCV 92.9 fL      MCH 31.4 pg      MCHC 33.8 g/dL      RDW 15.3 %      RDW-SD 49.9 fl      MPV 6.5 fL      Platelets 36 10*3/mm3     Troponin [[id number]]  (Abnormal) Collected:  02/06/20 0326    Specimen:  Blood Updated:  02/06/20 0536     Troponin T 0.064 ng/mL     Narrative:       Troponin T Reference Range:  <= 0.03 ng/mL-   Negative for AMI  >0.03 ng/mL-     Abnormal for myocardial necrosis.  Clinicians would have to utilize clinical acumen, EKG, Troponin and serial changes to determine if it is an Acute Myocardial Infarction or myocardial injury due to an underlying chronic condition.       Results may be falsely decreased  if patient taking Biotin.      Comprehensive Metabolic Panel [[id number]]  (Abnormal) Collected:  02/06/20 0326    Specimen:  Blood Updated:  02/06/20 0525     Glucose 146 mg/dL      BUN 23 mg/dL      Creatinine 1.90 mg/dL      Sodium 137 mmol/L      Potassium 4.0 mmol/L      Chloride 97 mmol/L      CO2 23.0 mmol/L      Calcium 8.7 mg/dL      Total Protein 7.0 g/dL      Albumin 3.20 g/dL      ALT (SGPT) 13 U/L      AST (SGOT) 34 U/L      Alkaline Phosphatase 80 U/L      Total Bilirubin 0.4 mg/dL      eGFR Non African Amer 34 mL/min/1.73      Globulin 3.8 gm/dL      A/G Ratio 0.8 g/dL      BUN/Creatinine Ratio 12.1     Anion Gap 17.0 mmol/L     Narrative:       GFR Normal >60  Chronic Kidney Disease <60  Kidney Failure <15      Phosphorus [[id number]]  (Normal) Collected:  02/06/20 0326    Specimen:  Blood Updated:  02/06/20 0525     Phosphorus 4.4 mg/dL     Magnesium [[id number]]  (Abnormal) Collected:  02/06/20 0326    Specimen:  Blood Updated:  02/06/20 0525     Magnesium 2.5 mg/dL     aPTT [[id number]]  (Normal) Collected:  02/06/20 0326    Specimen:  Blood Updated:  02/06/20 0518     PTT 71.2 seconds      Comment: Result checked        aPTT [[id number]]  (Abnormal) Collected:  02/05/20 2108    Specimen:  Blood from Arm, Right Updated:  02/05/20 2135     PTT 52.8 seconds     Potassium [[id number]]  (Normal) Collected:  02/05/20 1809    Specimen:  Blood Updated:  02/05/20 1914     Potassium 4.0 mmol/L     aPTT [[id number]]  (Abnormal) Collected:  02/05/20 1405    Specimen:  Blood from Arm, Right Updated:  02/05/20 1451     PTT 50.3 seconds     Pathology Consultation [[id number]] Collected:  02/05/20 0645    Specimen:  Blood, Venous Line Updated:  02/05/20 1213     Final Diagnosis --     Severe leukocytosis composed almost entirely of blasts (pt with history of AML)  Thrombocytopenia       Case Report --     Surgical Pathology Report                         Case: [id number]                                  Authorizing  Provider:  Geovany Murillo PA-C  Collected:           02/05/2020 06:45 AM          Ordering Location:     [facility] 3A    Received:            02/05/2020 09:15 AM                                 MEDICAL INPATIENT                                                            Pathologist:           Benito Silva MD                                                             Specimen:    Blood, Venous Line                                                                                   Palliative Assessment     Vital Signs (last 24 hours)       02/05 0700  -  02/06 0659 02/06 0700  -  02/06 1147   Most Recent    Temp (°F) 97.9 -  101.4       97.9 (36.6)    Heart Rate 98 -  114       114    Resp 16 -  20       18    /75 -  170/89       130/81    SpO2 (%) 94 -  96       95        Physical Exam   Constitutional: He appears well-developed and well-nourished.   HENT:   Head: Normocephalic.   Eyes: Pupils are equal, round, and reactive to light.   Cardiovascular: Normal rate and regular rhythm.   Pulmonary/Chest: Effort normal and breath sounds normal.   Abdominal: Soft. Bowel sounds are normal.   Musculoskeletal:   Screams with any movement to left leg / foot   Neurological:   Confused to person, place and time.   Skin:   Left lower extremity discolored, mottled without palpable pulses   Vitals reviewed.        Decisional Capacity: no  Patient's understanding of illness: no  Patient goals of care:  Ongoing discussion with spouse        ILENE Butler

## 2020-02-06 NOTE — PLAN OF CARE
Patient has been in a lot of pain during my shift. Crying out in pain to the LLE much of the day. MD Farias was made aware of the patients pain- I requested more pain coverage in effort to better manage the patients pain. See MD Farias's order of pain patch. Dilaudid has been given around the clock and pain patch applied. SEE MAR. Patient has been turned every 2 hours. Patient still on heparin gtt. PTT came back at goal rate for second time consecutively so PTT draw is put in for tomorrow morning. Monitoring for bleeding. Patients spouse had several attempts to be [facility] Per his note. She called today and code status was discussed as well as update on patient. I instructed her to call the vasc. Sx in order to gain more information to assist in forming an informed plan for her spouses care. Wife states she is very upset with [facility] due to she feels there was a lack of assisting with his medical needs especially related to his LLE.She requested an MD with Hospice to see the patient at home in order to help her husbands pain because she noticed the issue of the LLE several days prior to admission here at [facility]. I mentioned there are other hospice agencies in which the hospital could help coordinate her and her spouse with if she does wish. Discussed this with Lara NP-palliative at nurses station. She is also waiting to speak with the wife. Will continue to monitor.

## 2020-02-06 NOTE — NURSING NOTE
Patients heart monitor is not on. Patient is sleeping. When patient is awake he immediately cries out in pain. I will put heart monitor back on once patient is awake- I do not want to wake him since he is in so much pain and has not been able to sleep but for very brief short periods.

## 2020-02-06 NOTE — NURSING NOTE
Patient is resting comfortably. Not turning patient at this time to promote comfort for the patient

## 2020-02-07 NOTE — SIGNIFICANT NOTE
Fast team called by patient's RN early morning of 2/7/2020 secondary to patient being found unresponsive with a blood pressure of 70 systolic and low O2 saturation.  Patient was started on high flow O2 initially with some improvement of saturation however mental status and blood pressure continue to be poor.  ABGs and fluid bolus were ordered.    On my assessment patient found to be unresponsive with pinpoint pupils and slow shallow respirations were noted.  Distal pulses were noted is absent.    ABG noted with pH of 7.388, PCO2: 48.8, PO2 152.4, bicarb: 29.4.  Ionized calcium noted at 0.99.    Patient was given 0.2 mg of Narcan in addition to the fluid he was already receiving.  Patient had a quick response with improvement in blood pressure and respirations however he was again noted in obvious severe pain.    Fluids were continued to approximately 900 mL's and 50 mcg of fentanyl IV was ordered to attempt analgesia without causing any intentional hypotension.  There appeared to be mild relief with the first dose of fentanyl and respirations and blood pressure remained normal.  Second dose of fentanyl 50 mg was administered patient appeared to become more relaxed with continued adequate respirations and blood pressure.  Patient weaned to simple mask with plans to continue decreasing O2 as conditions allowed.  Patient's RN who monitor patient closely for change in status.

## 2020-02-07 NOTE — PROGRESS NOTES
I had a long conversation with the patient's wife and 2 other close family members at the bedside this afternoon.  We talked about the potential options and eventually they understand that an above-the-knee amputation offers little benefit.  I recommend deseeding the heparin drip and moving forward with comfort measures only.  We will see again as needed.

## 2020-02-07 NOTE — PROGRESS NOTES
Hematology/Oncology Inpatient Progress Note    PATIENT NAME: Rock Rock  : 1940  MRN: [id number]    CHIEF COMPLAINT: AML    HISTORY OF PRESENT ILLNESS:  79 y.o. male who presented to [facility] on 2020 reporting left lower leg and foot pain for 1.5 weeks.  The pain was getting worse and there was discoloration noted over the past 2 days.  He was transferred here for further evaluation.  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  The pain pills he had from Hospice were not controlling his pain.  White count was 169 (95% blasts), hemoglobin 10.8, platelets 43 with INR 1.26.  His left foot was mottled and cold.  Vascular surgery was consulted.  There was no motor or sensory function in his foot and it was recommended to proceed with pain control and anticoagulation.   The option of palliative amputation was given, but with significant thrombocytopenia and progression of his AML affecting wound healing, this was felt to not be an option at this time.  Palliative care was consulted.  He was started on a heparin drip.     20  Hematology/Oncology was consulted as he is an established patient who we treated for AML.  He received 1 cycle of Dacogen in 2009 resulting in pancytopenia, diarrhea and neutropenic fever.  He was hospitalized and decided to forego any further treatment and was discharged in hospice care.     PCP: Jovana Nur APRN    · 2020 palliative care consult completed.  Trying to establish family contact to assist patient in making decision regarding left leg amputation versus continued hospice care.  · 2020 fast team called for hypoxia treated with Narcan with subsequent uncontrolled pain.    History of present illness reviewed and changes noted since prior visit    Subjective Patient was sleeping at the time of my exam.  Does not wake up.  On nasal cannula.  Daughter at bedside.  ROS:  Review of Systems   Unable to perform ROS:  Patient nonverbal   Constitutional: Negative for diaphoresis, fatigue, fever and unexpected weight change.   HENT: Negative for congestion and nosebleeds.    Eyes: Negative.    Respiratory: Negative for cough and shortness of breath.    Cardiovascular: Negative for chest pain and leg swelling.   Gastrointestinal: Negative for abdominal pain, blood in stool, constipation, diarrhea, nausea and vomiting.   Endocrine: Negative for cold intolerance and heat intolerance.   Genitourinary: Negative for dysuria and hematuria.   Musculoskeletal: Negative for arthralgias and joint swelling.   Skin: Negative for rash and wound.   Neurological: Negative for numbness and headaches.   Hematological: Does not bruise/bleed easily.   Psychiatric/Behavioral: Negative for confusion. The patient is not nervous/anxious.    All other systems reviewed and are negative.       MEDICATIONS:    Scheduled Meds:      fentaNYL 1 patch Transdermal Q72H   And      Check Fentanyl Patch Placement 1 each Does not apply Q12H   gabapentin 600 mg Oral BID   LORazepam 1 mg Intravenous Q2H   sodium chloride 10 mL Intravenous Q12H      Continuous Infusions:      heparin 18 Units/kg/hr Last Rate: 21.89 Units/kg/hr (02/07/20 1100)      PRN Meds:  •  acetaminophen **OR** acetaminophen **OR** acetaminophen  •  Calcium Gluconate-NaCl **AND** calcium gluconate **AND** Calcium, Ionized  •  docusate sodium  •  fentaNYL citrate (PF)  •  heparin  •  heparin  •  HYDROmorphone  •  magnesium sulfate **OR** magnesium sulfate **OR** magnesium sulfate  •  melatonin  •  nitroglycerin  •  ondansetron **OR** ondansetron  •  oxyCODONE  •  potassium & sodium phosphates **OR** potassium & sodium phosphates  •  potassium chloride  •  potassium chloride  •  sodium chloride     ALLERGIES:    Allergies   Allergen Reactions   • Codeine Itching       Objective    VITALS:   BP 91/69 (BP Location: Right arm, Patient Position: Lying)   Pulse 111   Temp (!) 103 °F (39.4 °C) (Axillary)   " Resp 20   Ht 188 cm (74\")   Wt 77.2 kg (170 lb 1.6 oz)   SpO2 97%   BMI 21.84 kg/m²     PHYSICAL EXAM:  Physical Exam   Constitutional: He is oriented to person, place, and time. He appears well-developed and well-nourished.   HENT:   Head: Normocephalic and atraumatic.   Mouth/Throat: Oropharynx is clear and moist.   Conjunctival pallor   Eyes: Pupils are equal, round, and reactive to light. Conjunctivae, EOM and lids are normal.   Neck: Normal range of motion. Neck supple. No thyromegaly present.   Cardiovascular: Normal rate, regular rhythm and normal heart sounds.   No murmur heard.  Pulmonary/Chest: Effort normal and breath sounds normal. No respiratory distress.   Abdominal: Soft. Normal appearance and bowel sounds are normal. He exhibits no distension.   Genitourinary:   Genitourinary Comments: Deferred.   Musculoskeletal: Normal range of motion. He exhibits no edema.   Mottling and erythema noted on the left foot and the lower left leg.     Lymphadenopathy:     He has no cervical adenopathy.     He has no axillary adenopathy.        Right: No supraclavicular adenopathy present.        Left: No supraclavicular adenopathy present.   Neurological: He is alert and oriented to person, place, and time.   Skin: Skin is warm and dry. Capillary refill takes less than 2 seconds. No bruising, no petechiae and no rash noted.   Psychiatric: He has a normal mood and affect. His speech is normal and behavior is normal. Judgment and thought content normal. Cognition and memory are normal.   Nursing note and vitals reviewed.        RECENT LABS:  Lab Results (last 24 hours)     Procedure Component Value Units Date/Time    Blood Culture - Blood, Arm, Right [[id number]] Collected:  02/07/20 1117    Specimen:  Blood from Arm, Right Updated:  02/07/20 1134    Blood Culture - Blood, Hand, Right [[id number]] Collected:  02/07/20 1118    Specimen:  Blood from Hand, Right Updated:  02/07/20 1134    aPTT [[id number]]  (Normal) " Collected:  02/07/20 0825    Specimen:  Blood from Arm, Right Updated:  02/07/20 0943     PTT 66.4 seconds     Manual Differential [[id number]]  (Abnormal) Collected:  02/07/20 0242    Specimen:  Blood Updated:  02/07/20 0703     Neutrophil % 2.0 %      Lymphocyte % 3.0 %      Monocyte % 2.0 %      Eosinophil % 1.0 %      Metamyelocyte % 2.0 %      Myelocyte % 1.0 %      Blasts % 89.0 %      Neutrophils Absolute 3.61 10*3/mm3      Lymphocytes Absolute 5.41 10*3/mm3      Monocytes Absolute 3.61 10*3/mm3      Eosinophils Absolute 1.80 10*3/mm3      RBC Morphology Normal     WBC Morphology Normal     Platelet Estimate Decreased    Narrative:       Reviewed by Pathologist within the past 30 days on 02.05.2020.      CBC & Differential [[id number]] Collected:  02/07/20 0242    Specimen:  Blood Updated:  02/07/20 0703    Narrative:       The following orders were created for panel order CBC & Differential.  Procedure                               Abnormality         Status                     ---------                               -----------         ------                     CBC Auto Differential[[id number]]        Abnormal            Final result                 Please view results for these tests on the individual orders.    CBC Auto Differential [[id number]]  (Abnormal) Collected:  02/07/20 0242    Specimen:  Blood Updated:  02/07/20 0703     .40 10*3/mm3      RBC 3.00 10*6/mm3      Hemoglobin 9.7 g/dL      Hematocrit 28.2 %      MCV 93.7 fL      MCH 32.3 pg      MCHC 34.4 g/dL      RDW 15.2 %      RDW-SD 50.8 fl      MPV 6.7 fL      Platelets 36 10*3/mm3     Scan Slide [[id number]] Collected:  02/07/20 0242    Specimen:  Blood Updated:  02/07/20 0703     Scan Slide --     Comment: See Manual Differential Results       aPTT [[id number]]  (Abnormal) Collected:  02/07/20 0242    Specimen:  Blood Updated:  02/07/20 0516     PTT 58.3 seconds     BNP [[id number]]  (Abnormal) Collected:  02/07/20 0242    Specimen:  Blood  Updated:  02/07/20 0457     proBNP 14,707.0 pg/mL     Narrative:       Among patients with dyspnea, NT-proBNP is highly sensitive for the detection of acute congestive heart failure. In addition NT-proBNP of <300 pg/ml effectively rules out acute congestive heart failure with 99% negative predictive value.    Results may be falsely decreased if patient taking Biotin.      Calcium, Ionized [[id number]]  (Abnormal) Collected:  02/07/20 0406    Specimen:  Blood Updated:  02/07/20 0451     Ionized Calcium 1.11 mmol/L     Potassium [[id number]]  (Normal) Collected:  02/07/20 0242    Specimen:  Blood Updated:  02/07/20 0445     Potassium 3.7 mmol/L     Urinalysis With Culture If Indicated - Urine, Catheter In/Out [[id number]]  (Abnormal) Collected:  02/07/20 0244    Specimen:  Urine, Catheter In/Out Updated:  02/07/20 0318     Color, UA Yellow     Appearance, UA Turbid     Comment: Result checked         pH, UA 5.5     Specific Gravity, UA 1.014     Glucose, UA Negative     Ketones, UA Negative     Bilirubin, UA Negative     Blood, UA Large (3+)     Protein,  mg/dL (2+)     Leuk Esterase, UA Large (3+)     Nitrite, UA Negative     Urobilinogen, UA 0.2 E.U./dL    Urinalysis, Microscopic Only - Urine, Catheter In/Out [[id number]]  (Abnormal) Collected:  02/07/20 0244    Specimen:  Urine, Catheter In/Out Updated:  02/07/20 0318     RBC, UA 21-30 /HPF      WBC, UA Too Numerous to Count /HPF      Bacteria, UA 3+ /HPF      Squamous Epithelial Cells, UA 3-6 /HPF      Hyaline Casts, UA 0-2 /LPF      Coarse Granular Casts, UA 7-12 /LPF      Methodology Manual Light Microscopy    Urine Culture - Urine, Urine, Catheter In/Out [[id number]] Collected:  02/07/20 0244    Specimen:  Urine, Catheter In/Out Updated:  02/07/20 0317    Blood Gas, Arterial [[id number]]  (Abnormal) Collected:  02/07/20 0220    Specimen:  Arterial Blood Updated:  02/07/20 0223     Site Right Radial     Alan's Test Positive     pH, Arterial 7.388 pH units       pCO2, Arterial 48.8 mm Hg      pO2, Arterial 152.4 mm Hg      HCO3, Arterial 29.4 mmol/L      Base Excess, Arterial 3.7 mmol/L      Comment: Serial Number: [id number]:  [id number]        O2 Saturation, Arterial 99.3 %      CO2 Content 30.9 mmol/L      Barometric Pressure for Blood Gas --     Comment: N/A        Modality NRB     FIO2 100 %      Hemodilution No    POCT Electrolytes +HGB +HCT [[id number]]  (Abnormal) Collected:  02/07/20 0220    Specimen:  Blood Updated:  02/07/20 0223     Sodium 142 mmol/L      POC Potassium 3.6 mmol/L      Ionized Calcium 0.99 mmol/L      Comment: Serial Number: [id number]:  [id number]        Glucose 195 mg/dL      Hematocrit 33 %      Hemoglobin 11.2 g/dL     POC Lactate [[id number]]  (Normal) Collected:  02/07/20 0220    Specimen:  Blood Updated:  02/07/20 0223     Lactate 0.5 mmol/L      Comment: Serial Number: [id number]:  [id number]       POC Glucose Once [[id number]]  (Abnormal) Collected:  02/07/20 0208    Specimen:  Blood Updated:  02/07/20 0209     Glucose 179 mg/dL      Comment: Serial Number: [id number]:  [id number]       aPTT [[id number]]  (Normal) Collected:  02/06/20 1243    Specimen:  Blood Updated:  02/06/20 1307     PTT 74.2 seconds           PENDING RESULTS: none    IMAGING REVIEWED:  Xr Chest 1 View    Result Date: 2/7/2020    1.  Cardiomegaly.  No acute cardiopulmonary disease identified.   Electronically Signed By-David Morales On:2/7/2020 7:24 AM This report was finalized on [id number] by  David Morales, .      Assessment/Plan   ASSESSMENT:  1.   AML -anemia, leukocytosis and thrombocytopenia present.  Patient at high risk for stroke and acute decline in clinical condition due to hyperleukocytosis.  3. Anemia-no need for transfusions.  Stable.  4. Thrombocytopenia-stable.  5. Fever-patient in hospice care.  Prolonging his life will only prolong his pain.  Remains febrile.  Chest x-ray showed cardiomegaly.  Likely tumor fever or left lower extremity necrosis  and gangrene..  6. Acute left lower extremity arterial occlusion causing tissue necrosis-on heparin drip.  Hyperleukocytosis syndrome could be contributing to circulatory issues.  Vascular consulted recommending pain control and heparin drip.  Palliative care consulted for comfort.  7. CKD-creatinine trending higher.  No evidence of dehydration.  Possibly due to severe leukocytosis and tumor lysis syndrome.  8. 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 Recommend to continue heparin drip  2. Maximize pain control with help of palliative care.  Avoid Narcan.  3. May benefit from gabapentin in addition to narcotics.  4. Monitor for bleeding or falling hemoglobin.     Electronically signed by ILENE Loja, 02/07/20, 12:22 PM.           I personally reviewed all pertinent labs, related documentation and the  imaging. ROS performed and physical exam done during face to face enounter with patient. I agree with  nurse practitioner's doumentation, assessment and plan.  Patient resting comfortably today at the time of my exam after receiving fentanyl and Dilaudid.    Due to AML I feel he is not a candidate for amputation.  His thrombosis is due to hyperleukocytosis from the leukemic cells/leukemic burden.  Also hypercoagulable state from malignancy also contributing.  May continue heparin drip.  Do not think anything much can be done.      Patient was given Narcan yesterday by fast team and therefore was in a lot of pain and went through a lot of discomfort.  .  Recommend DNR/DNI.  Strongly recommend hospice, pain management

## 2020-02-07 NOTE — SIGNIFICANT NOTE
Rambo AGUIRRE for the hospitalist ordered 0.2 narcan which cause the patient to wake up and be more responsive  1 liter fluid bolus given ABG's showed low ionized calcium calcium gluconate 1gm IV given fentanyl given for pain

## 2020-02-07 NOTE — PROGRESS NOTES
"      [facility] Medicine Services Daily Progress Note      Hospitalist Team  LOS 2 days      Patient Care Team:  Jovana Nur APRN as PCP - General (Family Medicine)    Patient Location: 303/1      Subjective   Subjective     Chief Complaint / Subjective  No chief complaint on file.        Brief Synopsis of Hospital Course/HPI  Mr. Rock is a 79 y.o. male with past medical history of dementia, hypertension, hypothyroidism, Hyperlipidemia and leukemia who presents to [facility] complaining of pain in left calf and foot.  ED provider from sending facility notes patient presented via EMS complaining of pain and discoloration of his foot for the past 2 days.  Patient is A&O x1 and is a generally poor historian with frequent tangential speech.  He does note his pain began on/2/2020 as a cramping sensation.  He states it has continued to get worse in spite of pain pill provided by his hospice provider.        At the sending facility patient found to have labs significant for potassium: 3.2, glucose: 208, creatinine: 1.96, WBCs: 179.5, hemoglobin: 11.1     2/7/2020: Patient in significant more pain and I spoke to family at the bedside.  Patient will be hospice and palliative care only.  Family was explained about options of palliative surgery versus palliative medications.    Review of Systems   Unable to perform ROS: severity of pain       Objective   Objective      Vital Signs  Temp:  [97.5 °F (36.4 °C)-103 °F (39.4 °C)] 103 °F (39.4 °C)  Heart Rate:  [] 111  Resp:  [12-26] 20  BP: ()/(42-74) 91/69  Oxygen Therapy  SpO2: 97 %  Pulse Oximetry Type: Intermittent  Device (Oxygen Therapy): simple face mask  Flow (L/min): 5  Oxygen Concentration (%): 99  Flowsheet Rows      First Filed Value   Admission Height  188 cm (74\") Documented at 02/05/2020 0600   Admission Weight  77.7 kg (171 lb 4.8 oz) Documented at 02/05/2020 0600        Intake & Output (last 3 days)       02/04 0701 " - 02/05 0700 02/05 0701 - 02/06 0700 02/06 0701 - 02/07 0700 02/07 0701 - 02/08 0700    P.O. 120 1140 240 0    I.V. (mL/kg)   7.5 (0.1)     Total Intake(mL/kg) 120 (1.5) 1140 (14.8) 247.5 (3.2) 0 (0)    Urine (mL/kg/hr) 0       Stool 0       Total Output 0       Net +120 +1140 +247.5 0            Urine Unmeasured Occurrence 1 x 8 x      Stool Unmeasured Occurrence  2 x          Lines, Drains & Airways    Active LDAs     Name:   Placement date:   Placement time:   Site:   Days:    Peripheral IV 02/05/20 0744 Left Arm   02/05/20 0744    Arm   1                  Physical Exam:    Physical Exam   HENT:   Head: Normocephalic and atraumatic.   Eyes: Conjunctivae are normal.   Neck: Normal range of motion. Neck supple.   Cardiovascular: Normal rate and regular rhythm.   Pulmonary/Chest: Effort normal and breath sounds normal.   Abdominal: Soft. Bowel sounds are normal.   Musculoskeletal: Normal range of motion.   Skin: Skin is warm.   Nursing note and vitals reviewed.    Procedures:    Results Review:     I reviewed the patient's new clinical results.      Lab Results (last 24 hours)     Procedure Component Value Units Date/Time    Blood Culture - Blood, Arm, Right [[id number]] Collected:  02/07/20 1117    Specimen:  Blood from Arm, Right Updated:  02/07/20 1134    Blood Culture - Blood, Hand, Right [[id number]] Collected:  02/07/20 1118    Specimen:  Blood from Hand, Right Updated:  02/07/20 1134    aPTT [[id number]]  (Normal) Collected:  02/07/20 0825    Specimen:  Blood from Arm, Right Updated:  02/07/20 0943     PTT 66.4 seconds     Manual Differential [[id number]]  (Abnormal) Collected:  02/07/20 0242    Specimen:  Blood Updated:  02/07/20 0703     Neutrophil % 2.0 %      Lymphocyte % 3.0 %      Monocyte % 2.0 %      Eosinophil % 1.0 %      Metamyelocyte % 2.0 %      Myelocyte % 1.0 %      Blasts % 89.0 %      Neutrophils Absolute 3.61 10*3/mm3      Lymphocytes Absolute 5.41 10*3/mm3      Monocytes Absolute 3.61  10*3/mm3      Eosinophils Absolute 1.80 10*3/mm3      RBC Morphology Normal     WBC Morphology Normal     Platelet Estimate Decreased    Narrative:       Reviewed by Pathologist within the past 30 days on 02.05.2020.      CBC & Differential [[id number]] Collected:  02/07/20 0242    Specimen:  Blood Updated:  02/07/20 0703    Narrative:       The following orders were created for panel order CBC & Differential.  Procedure                               Abnormality         Status                     ---------                               -----------         ------                     CBC Auto Differential[[id number]]        Abnormal            Final result                 Please view results for these tests on the individual orders.    CBC Auto Differential [[id number]]  (Abnormal) Collected:  02/07/20 0242    Specimen:  Blood Updated:  02/07/20 0703     .40 10*3/mm3      RBC 3.00 10*6/mm3      Hemoglobin 9.7 g/dL      Hematocrit 28.2 %      MCV 93.7 fL      MCH 32.3 pg      MCHC 34.4 g/dL      RDW 15.2 %      RDW-SD 50.8 fl      MPV 6.7 fL      Platelets 36 10*3/mm3     Scan Slide [[id number]] Collected:  02/07/20 0242    Specimen:  Blood Updated:  02/07/20 0703     Scan Slide --     Comment: See Manual Differential Results       aPTT [[id number]]  (Abnormal) Collected:  02/07/20 0242    Specimen:  Blood Updated:  02/07/20 0516     PTT 58.3 seconds     BNP [[id number]]  (Abnormal) Collected:  02/07/20 0242    Specimen:  Blood Updated:  02/07/20 0457     proBNP 14,707.0 pg/mL     Narrative:       Among patients with dyspnea, NT-proBNP is highly sensitive for the detection of acute congestive heart failure. In addition NT-proBNP of <300 pg/ml effectively rules out acute congestive heart failure with 99% negative predictive value.    Results may be falsely decreased if patient taking Biotin.      Calcium, Ionized [[id number]]  (Abnormal) Collected:  02/07/20 0406    Specimen:  Blood Updated:  02/07/20 0451     Ionized  Calcium 1.11 mmol/L     Potassium [[id number]]  (Normal) Collected:  02/07/20 0242    Specimen:  Blood Updated:  02/07/20 0445     Potassium 3.7 mmol/L     Urinalysis With Culture If Indicated - Urine, Catheter In/Out [[id number]]  (Abnormal) Collected:  02/07/20 0244    Specimen:  Urine, Catheter In/Out Updated:  02/07/20 0318     Color, UA Yellow     Appearance, UA Turbid     Comment: Result checked         pH, UA 5.5     Specific Gravity, UA 1.014     Glucose, UA Negative     Ketones, UA Negative     Bilirubin, UA Negative     Blood, UA Large (3+)     Protein,  mg/dL (2+)     Leuk Esterase, UA Large (3+)     Nitrite, UA Negative     Urobilinogen, UA 0.2 E.U./dL    Urinalysis, Microscopic Only - Urine, Catheter In/Out [[id number]]  (Abnormal) Collected:  02/07/20 0244    Specimen:  Urine, Catheter In/Out Updated:  02/07/20 0318     RBC, UA 21-30 /HPF      WBC, UA Too Numerous to Count /HPF      Bacteria, UA 3+ /HPF      Squamous Epithelial Cells, UA 3-6 /HPF      Hyaline Casts, UA 0-2 /LPF      Coarse Granular Casts, UA 7-12 /LPF      Methodology Manual Light Microscopy    Urine Culture - Urine, Urine, Catheter In/Out [[id number]] Collected:  02/07/20 0244    Specimen:  Urine, Catheter In/Out Updated:  02/07/20 0317    Blood Gas, Arterial [[id number]]  (Abnormal) Collected:  02/07/20 0220    Specimen:  Arterial Blood Updated:  02/07/20 0223     Site Right Radial     Alan's Test Positive     pH, Arterial 7.388 pH units      pCO2, Arterial 48.8 mm Hg      pO2, Arterial 152.4 mm Hg      HCO3, Arterial 29.4 mmol/L      Base Excess, Arterial 3.7 mmol/L      Comment: Serial Number: [id number]:  [id number]        O2 Saturation, Arterial 99.3 %      CO2 Content 30.9 mmol/L      Barometric Pressure for Blood Gas --     Comment: N/A        Modality NRB     FIO2 100 %      Hemodilution No    POCT Electrolytes +HGB +HCT [[id number]]  (Abnormal) Collected:  02/07/20 0220    Specimen:  Blood Updated:  02/07/20 0223     Sodium  142 mmol/L      POC Potassium 3.6 mmol/L      Ionized Calcium 0.99 mmol/L      Comment: Serial Number: [id number]:  [id number]        Glucose 195 mg/dL      Hematocrit 33 %      Hemoglobin 11.2 g/dL     POC Lactate [[id number]]  (Normal) Collected:  02/07/20 0220    Specimen:  Blood Updated:  02/07/20 0223     Lactate 0.5 mmol/L      Comment: Serial Number: [id number]:  [id number]       POC Glucose Once [[id number]]  (Abnormal) Collected:  02/07/20 0208    Specimen:  Blood Updated:  02/07/20 0209     Glucose 179 mg/dL      Comment: Serial Number: [id number]:  [id number]           No results found for: HGBA1C  Results from last 7 days   Lab Units 02/05/20 0645   INR  1.26*       Results from last 7 days   Lab Units 02/07/20 0220   PH, ARTERIAL pH units 7.388   PO2 ART mm Hg 152.4*   PCO2, ARTERIAL mm Hg 48.8*   HCO3 ART mmol/L 29.4*     No results found for: LIPASE  No results found for: CHOL, CHLPL, TRIG, HDL, LDL, LDLDIRECT    Lab Results   Lab Value Date/Time    FINALDX  02/05/2020 0645     Severe leukocytosis composed almost entirely of blasts (pt with history of AML)  Thrombocytopenia      FINALDX  09/04/2019 2027     Pancytopenia.         Microbiology Results (last 10 days)     ** No results found for the last 240 hours. **          ECG/EMG Results (most recent)     None                    Xr Chest 1 View    Result Date: 2/7/2020    1.  Cardiomegaly.  No acute cardiopulmonary disease identified.   Electronically Signed By-David Morales On:2/7/2020 7:24 AM This report was finalized on [id number] by  David Morales, .          Xrays, labs reviewed personally by physician.    Medication Review:   I have reviewed the patient's current medication list      Scheduled Meds    fentaNYL 1 patch Transdermal Q72H   And      Check Fentanyl Patch Placement 1 each Does not apply Q12H   gabapentin 600 mg Oral BID   LORazepam 1 mg Intravenous Q2H   sodium chloride 10 mL Intravenous Q12H       Meds Infusions       Meds  PRN  •  acetaminophen **OR** acetaminophen **OR** acetaminophen  •  carboxymethylcellulose sod  •  diphenhydrAMINE **OR** diphenhydrAMINE  •  diphenoxylate-atropine  •  fentaNYL citrate (PF)  •  glycopyrrolate **OR** glycopyrrolate **OR** glycopyrrolate **OR** glycopyrrolate  •  haloperidol **OR** haloperidol **OR** haloperidol lactate  •  HYDROmorphone  •  LORazepam  •  LORazepam  •  LORazepam  •  LORazepam  •  LORazepam  •  LORazepam  •  LORazepam  •  LORazepam  •  LORazepam  •  LORazepam  •  LORazepam  •  LORazepam  •  LORazepam  •  LORazepam  •  LORazepam  •  Morphine **OR** morphine  •  Morphine **OR** morphine  •  Morphine **OR** morphine  •  [DISCONTINUED] ondansetron **OR** ondansetron  •  oxyCODONE  •  promethazine **OR** promethazine **OR** promethazine **OR** promethazine  •  Scopolamine  •  sodium chloride        Assessment/Plan   Assessment/Plan     Active Hospital Problems    Diagnosis  POA   • **Arterial occlusion [I70.90]  Yes     Priority: High   • 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 (CMS/Formerly [facility]) [C92.00]  Yes     Priority: High   • Chronic kidney disease, stage III (moderate) (CMS/HCC) [N18.3]  Yes     Priority: Medium   • Dementia (CMS/Former[facility]) [F03.90]  Yes     Priority: Medium   • Hypokalemia [E87.6]  Yes     Priority: Medium   • Hyperlipidemia [E78.5]  Yes     Priority: Low   • Hypothyroidism [E03.9]  Yes     Priority: Low   • Hypertension [I10]  Yes     Priority: Low   • Osteoarthritis of hip [M16.9]  Yes     Priority: Low   • Anemia [D64.9]  Yes      Resolved Hospital Problems   No resolved problems to display.       MEDICAL DECISION MAKING COMPLEXITY BY PROBLEM:     Left lower extremity acute ischemic limb  -Patient is on heparin drip  -Has been seen by vascular and they have advised conservative management  -Patient is on IV narcotic pain medications for control and also adding fentanyl patch for the pain control.  -Patient has poor prognosis and will be seen by palliative care plan possible  hospice  -Patient is hospice now      Acute myeloid leukemia  -Patient being seen by oncology  -Poor prognosis    Chronic kidney disease stage III      VTE Prophylaxis -   Mechanical Order History:     None      Pharmalogical Order History:     Ordered     Dose Route Frequency Stop    02/05/20 0635  heparin 76238 units/250 ml (100 units/ml) in D5W  13.98 mL/hr      18 Units/kg/hr IV Titrated --    02/05/20 0635  heparin bolus from bag 3,100 Units      40 Units/kg IV Every 6 Hours PRN --    02/05/20 0635  heparin bolus from bag 6,200 Units      80 Units/kg IV Every 6 Hours PRN --            Code Status -   Code Status and Medical Interventions:   Ordered at: 02/07/20 1321     Code Status:    No CPR     Medical Interventions (Level of Support Prior to Arrest):    Comfort Measures       Discharge Planning          Destination      Coordination has not been started for this encounter.      Durable Medical Equipment      Coordination has not been started for this encounter.      Dialysis/Infusion      Coordination has not been started for this encounter.      Home Medical Care      Coordination has not been started for this encounter.      Therapy      Coordination has not been started for this encounter.      Community Resources      Coordination has not been started for this encounter.            Electronically signed by Wilber Farias MD, 02/07/20, 5:35 PM.  [facility] Hospitalist Team

## 2020-02-07 NOTE — PROGRESS NOTES
Chart reviewed.  Patient felt to be unresponsive last night and given Narcan.  At this point, I am not sure that I understand the goals of care.  He is listed as comfort measures only.  I am willing to consider him for a palliative left above-the-knee amputation if his pain cannot be controlled.  However, this is not expected to extend his life in any significant way, may introduce surgical pain, and does not reduce his risk for subsequent thrombotic events.

## 2020-02-07 NOTE — PROGRESS NOTES
Continued Stay Note   [place]     Patient Name: Rock Rock  MRN: [id number]  Today's Date: 2/7/2020    Admit Date: 2/5/2020    Discharge Plan     Row Name 02/07/20 1532       Plan    Plan  Pending hospital course - plan is to re-refer [facility] per wife's request.     Plan Comments  Barrier: Per documentation, patient came in with [facility], patient's wife revoked and is now interested in a re-referral to [facility] with possibility of GIP.         Expected Discharge Date and Time     Expected Discharge Date Expected Discharge Time    Feb 7, 2020           Elva Solorio  [phone]

## 2020-02-07 NOTE — PROGRESS NOTES
Palliative Care Follow Up Note    Patient Code Status    Code Status and Medical Interventions:   Ordered at: 02/07/20 0611     Level Of Support Discussed With:    Next of Kin (If No Surrogate)     Code Status:    No CPR     Medical Interventions (Level of Support Prior to Arrest):    Comfort Measures          LOS: 2 days   Patient Care Team:  Jovana Nur APRN as PCP - General (Family Medicine)    Chief Complaint:  Severe left leg pain/ FAST call at 0200 this morning with narcan given to revere oversedation.     Interval History:     Patient Complaints:   Patient Denies:    History taken from:  Discussed with Dr Krause regarding surgical options and discussion with spouse and friend April. Both requesting patient to be comfort care only and better pain management of severe leg pain. Dr Krause is in agreement that amputation of leg would not improve the patients quality of life of with pain control  Long discussion regarding hospice referral and spouse agreeable to re-consult [facility] for GIP admission. Wife is not able to take patient home and is having a great deal of emotional distress even with discussions. She verbalized understanding and does not want surgery and understands it is probable not an option. She is fearful should she take him home even if his pain is controlled that she would bring him back because she can't stand seeing him in this much pain. She verbalized discussion of changing him to comfort care with surgeon and agreeable to proceed and bring hospice back on board with the goal to allow the patient to pass away naturally in the hospital setting.  Stat dose of IV dilaudid given with patient continuing to scream in pain.  Comfort care orders placed.  Emotional support provided.    Review of Systems   Unable to perform ROS: Acuity of condition       Physical Exam   Constitutional: He appears well-developed and well-nourished.   HENT:   Head: Normocephalic.   Eyes: Pupils are equal,  round, and reactive to light.   Cardiovascular: Normal rate and regular rhythm.   Pulmonary/Chest:   Scattered course   Neurological:   Confused, disoriented and yelling in pain when awake   Skin:   Left leg ischemia, mottled and cold to touch   Vitals reviewed.      Vital Signs (last 24 hours)       02/06 0700  -  02/07 0659 02/07 0700  -  02/07 1302   Most Recent    Temp (°F) 97.5 -  100.4      103     103 (39.4)    Heart Rate 96 -  113      111     111    Resp 12 - 26 20 20    BP 65/42 -  138/84      91/69     --  Comment: family refused vitals    SpO2 (%) 79 -  99       97          Lab Results (last 24 hours)     Procedure Component Value Units Date/Time    Blood Culture - Blood, Arm, Right [[id number]] Collected:  02/07/20 1117    Specimen:  Blood from Arm, Right Updated:  02/07/20 1134    Blood Culture - Blood, Hand, Right [[id number]] Collected:  02/07/20 1118    Specimen:  Blood from Hand, Right Updated:  02/07/20 1134    aPTT [[id number]]  (Normal) Collected:  02/07/20 0825    Specimen:  Blood from Arm, Right Updated:  02/07/20 0943     PTT 66.4 seconds     Manual Differential [[id number]]  (Abnormal) Collected:  02/07/20 0242    Specimen:  Blood Updated:  02/07/20 0703     Neutrophil % 2.0 %      Lymphocyte % 3.0 %      Monocyte % 2.0 %      Eosinophil % 1.0 %      Metamyelocyte % 2.0 %      Myelocyte % 1.0 %      Blasts % 89.0 %      Neutrophils Absolute 3.61 10*3/mm3      Lymphocytes Absolute 5.41 10*3/mm3      Monocytes Absolute 3.61 10*3/mm3      Eosinophils Absolute 1.80 10*3/mm3      RBC Morphology Normal     WBC Morphology Normal     Platelet Estimate Decreased    Narrative:       Reviewed by Pathologist within the past 30 days on 02.05.2020.      CBC & Differential [[id number]] Collected:  02/07/20 0242    Specimen:  Blood Updated:  02/07/20 0703    Narrative:       The following orders were created for panel order CBC & Differential.  Procedure                               Abnormality          Status                     ---------                               -----------         ------                     CBC Auto Differential[[id number]]        Abnormal            Final result                 Please view results for these tests on the individual orders.    CBC Auto Differential [[id number]]  (Abnormal) Collected:  02/07/20 0242    Specimen:  Blood Updated:  02/07/20 0703     .40 10*3/mm3      RBC 3.00 10*6/mm3      Hemoglobin 9.7 g/dL      Hematocrit 28.2 %      MCV 93.7 fL      MCH 32.3 pg      MCHC 34.4 g/dL      RDW 15.2 %      RDW-SD 50.8 fl      MPV 6.7 fL      Platelets 36 10*3/mm3     Scan Slide [[id number]] Collected:  02/07/20 0242    Specimen:  Blood Updated:  02/07/20 0703     Scan Slide --     Comment: See Manual Differential Results       aPTT [[id number]]  (Abnormal) Collected:  02/07/20 0242    Specimen:  Blood Updated:  02/07/20 0516     PTT 58.3 seconds     BNP [[id number]]  (Abnormal) Collected:  02/07/20 0242    Specimen:  Blood Updated:  02/07/20 0457     proBNP 14,707.0 pg/mL     Narrative:       Among patients with dyspnea, NT-proBNP is highly sensitive for the detection of acute congestive heart failure. In addition NT-proBNP of <300 pg/ml effectively rules out acute congestive heart failure with 99% negative predictive value.    Results may be falsely decreased if patient taking Biotin.      Calcium, Ionized [[id number]]  (Abnormal) Collected:  02/07/20 0406    Specimen:  Blood Updated:  02/07/20 0451     Ionized Calcium 1.11 mmol/L     Potassium [[id number]]  (Normal) Collected:  02/07/20 0242    Specimen:  Blood Updated:  02/07/20 0445     Potassium 3.7 mmol/L     Urinalysis With Culture If Indicated - Urine, Catheter In/Out [[id number]]  (Abnormal) Collected:  02/07/20 0244    Specimen:  Urine, Catheter In/Out Updated:  02/07/20 0318     Color, UA Yellow     Appearance, UA Turbid     Comment: Result checked         pH, UA 5.5     Specific Gravity, UA 1.014     Glucose, UA  Negative     Ketones, UA Negative     Bilirubin, UA Negative     Blood, UA Large (3+)     Protein,  mg/dL (2+)     Leuk Esterase, UA Large (3+)     Nitrite, UA Negative     Urobilinogen, UA 0.2 E.U./dL    Urinalysis, Microscopic Only - Urine, Catheter In/Out [[id number]]  (Abnormal) Collected:  02/07/20 0244    Specimen:  Urine, Catheter In/Out Updated:  02/07/20 0318     RBC, UA 21-30 /HPF      WBC, UA Too Numerous to Count /HPF      Bacteria, UA 3+ /HPF      Squamous Epithelial Cells, UA 3-6 /HPF      Hyaline Casts, UA 0-2 /LPF      Coarse Granular Casts, UA 7-12 /LPF      Methodology Manual Light Microscopy    Urine Culture - Urine, Urine, Catheter In/Out [[id number]] Collected:  02/07/20 0244    Specimen:  Urine, Catheter In/Out Updated:  02/07/20 0317    Blood Gas, Arterial [[id number]]  (Abnormal) Collected:  02/07/20 0220    Specimen:  Arterial Blood Updated:  02/07/20 0223     Site Right Radial     Alan's Test Positive     pH, Arterial 7.388 pH units      pCO2, Arterial 48.8 mm Hg      pO2, Arterial 152.4 mm Hg      HCO3, Arterial 29.4 mmol/L      Base Excess, Arterial 3.7 mmol/L      Comment: Serial Number: [id number]:  [id number]        O2 Saturation, Arterial 99.3 %      CO2 Content 30.9 mmol/L      Barometric Pressure for Blood Gas --     Comment: N/A        Modality NRB     FIO2 100 %      Hemodilution No    POCT Electrolytes +HGB +HCT [[id number]]  (Abnormal) Collected:  02/07/20 0220    Specimen:  Blood Updated:  02/07/20 0223     Sodium 142 mmol/L      POC Potassium 3.6 mmol/L      Ionized Calcium 0.99 mmol/L      Comment: Serial Number: [id number]:  [id number]        Glucose 195 mg/dL      Hematocrit 33 %      Hemoglobin 11.2 g/dL     POC Lactate [[id number]]  (Normal) Collected:  02/07/20 0220    Specimen:  Blood Updated:  02/07/20 0223     Lactate 0.5 mmol/L      Comment: Serial Number: [id number]:  [id number]       POC Glucose Once [[id number]]  (Abnormal) Collected:  02/07/20 0208    Specimen:   Blood Updated:  02/07/20 0209     Glucose 179 mg/dL      Comment: Serial Number: [id number]:  [id number]       aPTT [[id number]]  (Normal) Collected:  02/06/20 1243    Specimen:  Blood Updated:  02/06/20 1307     PTT 74.2 seconds            Results Review:     I reviewed the patient's new clinical results.      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    ILENE Butler  Palliative Medicine

## 2020-02-07 NOTE — PROGRESS NOTES
"Name: Rock Rock ADMIT: 2020   : 1940  PCP: Jovana Nur APRN    MRN: [id number] LOS: 2 days   AGE/SEX: 79 y.o. male  ROOM:  [facility] 303/1     CC: Left foot pain  Interval History: Patient is asleep this am. Wife is at bedside, she states they are comfort measures \"more or less\" but wants to discuss options with son but she \"thinks\" her  would not want an amputation.     Subjective   Review of Systems    Objective     Vital Signs  Temp:  [97.5 °F (36.4 °C)-100.4 °F (38 °C)] 100.4 °F (38 °C)  Heart Rate:  [] 102  Resp:  [12-26] 14  BP: ()/(42-84) 112/60    No intake/output data recorded.    Physical Exam           Vascular:   Data Reviewed:  CBC    Results from last 7 days   Lab Units 20  02220  0645   WBC 10*3/mm3 180.40*  --  172.20* 169.00*   HEMOGLOBIN g/dL 9.7*  --  10.2* 10.8*   HEMOGLOBIN, POC g/dL  --  11.2*  --   --    PLATELETS 10*3/mm3 36*  --  36* 43*     BMP   Results from last 7 days   Lab Units 20  18020  0645   SODIUM mmol/L  --  137  --   --    POTASSIUM mmol/L 3.7 4.0 4.0 3.2*   CHLORIDE mmol/L  --  97*  --   --    CO2 mmol/L  --  23.0  --   --    BUN mg/dL  --  23  --   --    CREATININE mg/dL  --  1.90*  --   --    GLUCOSE mg/dL  --  146*  --   --    MAGNESIUM mg/dL  --  2.5*  --   --    PHOSPHORUS mg/dL  --  4.4  --   --      Coag   Results from last 7 days   Lab Units 20  1243 20  1405 20  0645   INR   --   --   --   --   --  1.26*   APTT seconds 58.3* 74.2 71.2 52.8* 50.3* 29.4*     HbA1C No results found for: HGBA1C  Radiology(recent) Xr Chest 1 View    Result Date: 2020    1.  Cardiomegaly.  No acute cardiopulmonary disease identified.   Electronically Signed By-David Morales On:2020 7:24 AM This report was finalized on [id number] by  David Morales, .      Imaging Reviewed: None    Active " Hospital Problems    Diagnosis  POA   • **Arterial occlusion [I70.90]  Yes   • Chronic kidney disease, stage III (moderate) (CMS/[facility]) [N18.3]  Yes   • Dementia (CMS/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) [C92.00]  Yes   • Anemia [D64.9]  Yes   • Hypothyroidism [E03.9]  Yes   • Hypertension [I10]  Yes   • Osteoarthritis of hip [M16.9]  Yes      Resolved Hospital Problems   No resolved problems to display.      Assessment/Plan   [place], Subsequent Hospital Care    Impression/Plan:  Ischemic left foot with no motor or neuro sensation:     Palliative Care has been consulted for patient with AML.  Multiple reasons for arterial thrombotic episodes including his white blood cell count of 180.     The only thing we could potentially offer this gentleman is a  major amputation.  He will be at continued risk for new thrombotic episodes as the underlying etiology is not being treated.    Yumi Holguin PA-C  20  8:51 AM  Office Number [phone]

## 2020-02-07 NOTE — PLAN OF CARE
Problem: Patient Care Overview  Goal: Plan of Care Review  Outcome: Ongoing (interventions implemented as appropriate)  Flowsheets (Taken 2/7/2020 0300)  Progress: declining  Plan of Care Reviewed With: spouse; family  Outcome Summary: Patient had a fast team called at 0200 due to declining vitals.  Patient was stabilized and a conversation was had with the spouse and son about putting the patient as a DNR and comfort care measures.  The spouse agreed to DNR and comfort measures.  The patient has been turned Q2 and has been sleeping since 0430 this morning.  Will continue to monitor.

## 2020-02-07 NOTE — SIGNIFICANT NOTE
DNR / Comfort Measures    Palliative care  met with pt and wife to assess for goals of care.  Pt had originally admitted with being on service with [facility], but revoked because wife wanted to pursue further workup and treatment of pt's leg pain.  At time of visit pt's wife reports that she understands pt's options, but wants to focus on comfort measures rather than pursue any further aggressive treatment.  Care coordination notified to re-refer [facility] to follow up with family.  Before hospice was able to arrive, palliative care NP saw pt to help coordinate comfort needs.  See notes.  Emotional support provided.  [facility] to continue following and evaluate for GIP admission.  Will defer palliative care support to hospice agency if GIP is pursued.  Thank you for the consult.         02/07/20 1300   PC Encounter Information   Palliative Care Patient? yes   Referral Date 02/04/20   Referral Time 1326   Date of Initial Encounter with a Palliative Care Provider 02/05/20   Time of initial encounter with a Palliative Care Provider 0930   Time Required for Initial Referral 30 mins   Additional Visit/Time Required to Achieve Results 30 mins   Patient Unit at Time of Referral 3A   Patient Unit Specialty at Time of Referral medical surgical   Primary Diagnosis Leading to PC Consult vascular   Code Status at Time of Consult full   Palliative Care Team Members Involved in Consultation nurse practitioner;   Screening Status/Interventions   Psychosocial Needs pos   Psychosocial Needs Intervened yes   Spiritual Needs unable   Goals of Care/ACP pos   Goals of Care/ACP Intervened yes   Palliative Care Acuity   Psychosocial Acuity 1   Health Care Directives/Treatment Preferences   Advance Directive Document on Chart at Time of Consult yes   Pre-existing AND/MOST/POLST Order No   Advance Directive Status Patient has advance directive, copy in chart   Type of Advance Directive Durable  power of  for health care;Health care directive/Living will   Surrogate Decision Maker identified and documented   Goals of Care/Treatment Preferences (initial assessment and clinical changes) Completed   Treatment Preferences comfort measures   Code Status Discussed yes   POLST/MOST Initiated no   Outcomes   Code Status After Consult DNR/DNI   Number of Family Meetings 3

## 2020-02-07 NOTE — NURSING NOTE
Due to the patients low BP and low O2 level a fast team was called.  Patient was only responsive to movement and only then would cry out.  Patient was put on a non re breather mask  O2 came up. The patients BS was at 179.  The patient had not urinated in four hours and a in and out cath was done, 200 ml of urine was collected,specimen sent to the lab.  The patient's BP was still 74/54 and a fluid bolus was given 500 ML at 999.  Patients BP still did not respond and another Bolus was given.  Patient was than given Narcan, Patient is now responsive and crying out.  The patient's BP responded to the Bolus of NS but the lungs started to have some rales so the fluid was D/C.   At this time 0340 the patients BP is staying steady at 110/71,patient is on a simple mask with 5 L O2 stats are 97, 100 HR.  Patient is still crying out even though he was given Fentanyl.  Will continue to monitor how the patient responds to the Fentanyl and the oxygen.

## 2020-02-07 NOTE — PROGRESS NOTES
"Nutrition Services    Patient Name:  Rock Rock  YOB: 1940  MRN: [id number]  Admit Date:  2/5/2020    Patient assessed for check on regarding plan of care. Patient listed as \"comfort measures\", though plan of care is not certain regarding palliative amputation of LLE vs pain control only.    Continue current nutrition interventions as appropriate: Regular diet with Boost Plus BID.    Monitor plan of care.      Electronically signed by:  Kathy Rausch RD  02/07/20 11:22 AM   "

## 2020-02-07 NOTE — PLAN OF CARE
Problem: Patient Care Overview  Goal: Plan of Care Review  Outcome: Ongoing (interventions implemented as appropriate)  Flowsheets (Taken 2/7/2020 1721)  Plan of Care Reviewed With: patient; family; son; spouse  Note:   Patient is now being seen by hospice for inpatient pallative care. Patient is total comfort care. Heparin drip has been discontinued. Patient's spouse and  family are aware of place of care and request patient be made as comfortable as possible. Currently, patient is resting more comfortably with PRN IVP hydromorphone and scheduled IVP ativan. Patient continues to be on 3L oxygen nasal cannula.

## 2020-02-08 NOTE — NURSING NOTE
home here to  patient.  
Patient   was at bedside. Wife contacted.  Family requests patient to go to [facility] in [place]. MD De Los Santos notified. [facility] refuses patient. Awaiting wife to see patient. Will contact  home once family has had time with patient. Family given all belongings. Post Mortem care done.  
Pt discharged and readmitted as general inpatient hospice.  
Spoke to  home and they are on their way.  
today

## 2020-02-09 PROBLEM — I70.90 ARTERIAL OCCLUSION: Status: RESOLVED | Noted: 2020-01-01 | Resolved: 2020-02-09

## 2020-02-09 PROBLEM — F03.90 DEMENTIA (HCC): Chronic | Status: RESOLVED | Noted: 2020-01-01 | Resolved: 2020-02-09

## 2020-02-09 PROBLEM — N18.30 CHRONIC KIDNEY DISEASE, STAGE III (MODERATE) (HCC): Chronic | Status: RESOLVED | Noted: 2020-01-01 | Resolved: 2020-02-09

## 2020-02-09 PROBLEM — I10 HYPERTENSION: Status: RESOLVED | Noted: 2019-01-01 | Resolved: 2020-02-09

## 2020-02-09 PROBLEM — E86.0 DEHYDRATION: Status: RESOLVED | Noted: 2019-01-01 | Resolved: 2020-02-09

## 2020-02-09 PROBLEM — D64.9 ANEMIA: Status: RESOLVED | Noted: 2019-01-01 | Resolved: 2020-02-09

## 2020-02-09 PROBLEM — E03.9 HYPOTHYROIDISM: Status: RESOLVED | Noted: 2019-01-01 | Resolved: 2020-02-09

## 2020-02-09 PROBLEM — E78.5 HYPERLIPIDEMIA: Chronic | Status: RESOLVED | Noted: 2020-01-01 | Resolved: 2020-02-09

## 2020-02-09 PROBLEM — E87.6 HYPOKALEMIA: Status: RESOLVED | Noted: 2019-01-01 | Resolved: 2020-02-09

## 2020-02-09 PROBLEM — C92.00 ACUTE MYELOID LEUKEMIA IN ADULT (HCC): Chronic | Status: RESOLVED | Noted: 2019-01-01 | Resolved: 2020-02-09

## 2020-02-09 LAB
BACTERIA SPEC AEROBE CULT: ABNORMAL
BACTERIA SPEC AEROBE CULT: ABNORMAL
GRAM STN SPEC: ABNORMAL
ISOLATED FROM: ABNORMAL

## 2020-02-10 NOTE — H&P
[facility] Medicine Services      Patient Name: Rock Rock  : 1940  MRN: [id number]  Primary Care Physician: Jovana Nur APRN  Date of admission: 2020    Patient Care Team:  Jovana Nur APRN as PCP - General (Family Medicine)          Subjective   History Present Illness     Chief Complaint: end of life and comfort care     Mr. Rock is a 79 y.o. male with past medical history of dementia, hypertension, hypothyroidism, Hyperlipidemia and leukemia who presents to [facility] complaining of pain in left calf and foot.  ED provider from sending facility notes patient presented via EMS complaining of pain and discoloration of his foot for the past 2 days.  Patient is A&O x1 and is a generally poor historian with frequent tangential speech.  He does note his pain began on as a cramping sensation.  He states it has continued to get worse in spite of pain pill provided by his hospice provider.        At the sending facility patient found to have labs significant for potassium: 3.2, glucose: 208, creatinine: 1.96, WBCs: 179.5, hemoglobin: 11.1     2020: Patient in significant more pain and I spoke to family at the bedside.  Patient will be hospice and palliative care only.  Family was explained about options of palliative surgery versus palliative medications.     Left lower extremity acute ischemic limb  -Patient is on heparin drip  -Has been seen by vascular and they have advised conservative management  -Patient is on IV narcotic pain medications for control and also adding fentanyl patch for the pain control.  -Patient has poor prognosis and will be seen by palliative care plan possible hospice  -Patient is hospice now        Acute myeloid leukemia  -Patient being seen by oncology  -Poor prognosis     Chronic kidney disease stage III     Hospital course.  Patient had significant acute ischemia of the left lower extremity.  Patient was seen by  vascular surgery and advised palliative management.  Patient was on heparin drip and IV narcotics.  His pain was very uncontrolled and he was not improving.  Family was explained and patient was seen by palliative medicine as well as it was advised the patient should go to hospice the patient was discharged to hospice for comfort management only.            History of Present Illness    Review of Systems   Unable to perform ROS: patient unresponsive           Personal History     Past Medical History:   Past Medical History:   Diagnosis Date   • Anemia    • Biclonal gammopathy     Diagnosed in 2011   • Breast cancer (CMS/HCC)     History of breast cancer in 1991 status post mastectomy and lymph node dissection   • Cancer of kidney (CMS/HCC)     Left kidney cancer status post nephrectomy 2006   • Chronic kidney disease (CKD), stage III (moderate) (CMS/HCC)    • Dyslipidemia    • History of skin cancer     History of multiple skin cancers in 1994   • Hypertension        Surgical History:      Past Surgical History:   Procedure Laterality Date   • MASTECTOMY      Right mastectomy   • NEPHRECTOMY      Left nephrectomy           Family History: family history includes Cancer in his sister. Otherwise pertinent FHx was reviewed and unremarkable.     Social History:  reports that he has quit smoking. He has never used smokeless tobacco. He reports that he does not drink alcohol or use drugs.      Medications:  Prior to Admission medications    Medication Sig Start Date End Date Taking? Authorizing Provider   acetaminophen (TYLENOL) 500 MG tablet Take 500 mg by mouth Every 6 (Six) Hours As Needed for Mild Pain .    ProviderSusana MD   acyclovir (ZOVIRAX) 400 MG tablet Take 400 mg by mouth 2 (Two) Times a Day. 9/4/19   Susana Beard MD   atenolol (TENORMIN) 25 MG tablet Take 12.5 mg by mouth Daily.    ProviderSusana MD   atorvastatin (LIPITOR) 40 MG tablet Take 40 mg by mouth Daily.    Provider  MD Susana   baclofen (LIORESAL) 10 MG tablet Take 10 mg by mouth 2 (Two) Times a Day As Needed for Muscle Spasms.    Susana Beard MD   clotrimazole (MYCELEX) 10 MG kaitlyn Take 10 mg by mouth 3 (Three) Times a Day As Needed (thrush).    Susana Beard MD   Cyanocobalamin 500 MCG chewable tablet Chew 500 mcg Daily.    Susana Beard MD   diphenoxylate-atropine (LOMOTIL) 2.5-0.025 MG per tablet Take 2 tablets by mouth 4 (Four) Times a Day As Needed for Diarrhea. 10/2/19   Elva Alexis APRN   HYDROcodone-acetaminophen (NORCO) 5-325 MG per tablet Take 1 tablet by mouth Every 8 (Eight) Hours As Needed.    Susana Beard MD   levothyroxine (SYNTHROID, LEVOTHROID) 75 MCG tablet Take 75 mcg by mouth Daily.    Susana Beard MD   loperamide (IMODIUM) 2 MG capsule Take 1 capsule by mouth 4 (Four) Times a Day As Needed for Diarrhea. 10/2/19   Elva Alexis APRN   mirtazapine (REMERON) 15 MG tablet Take 15 mg by mouth Every Night.    Susana Beard MD   Multiple Vitamins-Minerals (MULTIVITAMIN WITH MINERALS) tablet tablet Take 1 tablet by mouth Daily.    Susana Beard MD   ondansetron ODT (ZOFRAN-ODT) 4 MG disintegrating tablet Take 8 mg by mouth Every 8 (Eight) Hours As Needed for Nausea or Vomiting.    Susana Beard MD   Potassium Chloride crystals 99 mg Daily.    Susana Beard MD   sulfamethoxazole-trimethoprim (BACTRIM DS,SEPTRA DS) 800-160 MG per tablet Take 1 tablet by mouth Daily. 9/4/19   Abby Joseph APRN   traZODone (DESYREL) 50 MG tablet Take 25 mg by mouth At Night As Needed for Sleep.    Susana Beard MD   Venetoclax (VENCLEXTA) 100 MG tablet Take 1 tablet by mouth on day 1. Take 2 tablets by mouth on day 2. Take 4 tablets by mouth daily starting day 3. 9/18/19   Sergio Stevens MD   vitamin D (ERGOCALCIFEROL) 75535 units capsule capsule Take 50,000 Units by mouth 1 (One) Time Per Week. Unknown what day of the week patient takes  medication    Provider, Susana, MD       Allergies:    Allergies   Allergen Reactions   • Codeine Itching       Objective   Objective     Vital Signs        on   ;      Body mass index is 21.8 kg/m².    Physical Exam   Cardiovascular: Normal rate, regular rhythm and normal heart sounds.   Pulmonary/Chest: Effort normal and breath sounds normal.   Abdominal: Soft. Bowel sounds are normal.           Results from last 7 days   Lab Units 02/07/20  0242 02/05/20  0645   WBC 10*3/mm3 180.40*   < > 169.00*   HEMOGLOBIN g/dL 9.7*   < > 10.8*   HEMOGLOBIN, POC   --    < >  --    HEMATOCRIT % 28.2*   < > 31.9*   HEMATOCRIT POC   --    < >  --    PLATELETS 10*3/mm3 36*   < > 43*   INR   --   --  1.26*    < > = values in this interval not displayed.     Results from last 7 days   Lab Units 02/07/20  0242 02/07/20  0220 02/06/20  0326   SODIUM mmol/L  --   --  137   POTASSIUM mmol/L 3.7  --  4.0   CHLORIDE mmol/L  --   --  97*   CO2 mmol/L  --   --  23.0   BUN mg/dL  --   --  23   CREATININE mg/dL  --   --  1.90*   GLUCOSE mg/dL  --   --  146*   CALCIUM mg/dL  --   --  8.7   ALT (SGPT) U/L  --   --  13   AST (SGOT) U/L  --   --  34   TROPONIN T ng/mL  --   --  0.064*   PROBNP pg/mL 14,707.0*  --   --    LACTATE mmol/L  --  0.5  --      Estimated Creatinine Clearance: 33.8 mL/min (A) (by C-G formula based on SCr of 1.9 mg/dL (H)).  Brief Urine Lab Results  (Last result in the past 365 days)      Color   Clarity   Blood   Leuk Est   Nitrite   Protein   CREAT   Urine HCG        02/07/20 0244 Yellow Turbid  Comment:  Result checked  Large (3+) Large (3+) Negative 100 mg/dL (2+)               Microbiology Results (last 10 days)     Procedure Component Value - Date/Time    Blood Culture - Blood, Hand, Right [[id number]]  (Abnormal)  (Susceptibility) Collected:  02/07/20 1118    Lab Status:  Final result Specimen:  Blood from Hand, Right Updated:  02/09/20 0653     Blood Culture Escherichia coli     Isolated from Aerobic Bottle      Gram Stain Aerobic Bottle Gram negative bacilli    Susceptibility      Escherichia coli     MARY     Ampicillin Resistant     Ampicillin + Sulbactam Intermediate     Cefepime Susceptible     Ceftazidime Susceptible     Ceftriaxone Susceptible     Gentamicin Resistant     Levofloxacin Susceptible     Piperacillin + Tazobactam Susceptible     Tobramycin Intermediate     Trimethoprim + Sulfamethoxazole Resistant                Susceptibility Comments     Escherichia coli    Cefazolin sensitivity will not be reported for Enterobacteriaceae in non-urine isolates. If cefazolin is preferred, please call the microbiology lab to request an E-test.  With the exception of urinary-sourced infections, aminoglycosides should not be used as monotherapy.             Blood Culture ID, PCR - Blood, Hand, Right [[id number]]  (Abnormal) Collected:  02/07/20 1118    Lab Status:  Final result Specimen:  Blood from Hand, Right Updated:  02/08/20 0528     BCID, PCR Escherichia coli. Identification by BCID PCR.    Blood Culture - Blood, Arm, Right [[id number]] Collected:  02/07/20 1117    Lab Status:  Preliminary result Specimen:  Blood from Arm, Right Updated:  02/08/20 1146     Blood Culture No growth at 24 hours    Urine Culture - Urine, Urine, Catheter In/Out [[id number]]  (Abnormal)  (Susceptibility) Collected:  02/07/20 0244    Lab Status:  Final result Specimen:  Urine, Catheter In/Out Updated:  02/09/20 0334     Urine Culture >100,000 CFU/mL Escherichia coli ESBL     Comment:   Consider infectious disease consult.  Susceptibility results may not correlate to clinical outcomes.       Susceptibility      Escherichia coli ESBL     MARY     Gentamicin Susceptible     Levofloxacin Resistant     Meropenem Susceptible     Nitrofurantoin Susceptible     Piperacillin + Tazobactam Susceptible     Tetracycline Susceptible     Trimethoprim + Sulfamethoxazole Resistant                          ECG/EMG Results (most recent)     None                     Xr Chest 1 View    Result Date: 2/7/2020    1.  Cardiomegaly.  No acute cardiopulmonary disease identified.   Electronically Signed By-David Morales On:2/7/2020 7:24 AM This report was finalized on [id number] by  David Morales, .        Estimated Creatinine Clearance: 33.8 mL/min (A) (by C-G formula based on SCr of 1.9 mg/dL (H)).    Assessment/Plan   Assessment/Plan       There are no hospital problems to display for this patient.                VTE Prophylaxis -   Mechanical Order History:     None      Pharmalogical Order History:     None          CODE STATUS:    Code Status and Medical Interventions:   Ordered at: 02/07/20 2225     Code Status:    No CPR     Medical Interventions (Level of Support Prior to Arrest):    Comfort Measures                   Electronically signed by Wilber Farias MD, 02/09/20, 10:06 PM.  [facility] Hospitalist Team

## 2020-02-12 NOTE — DISCHARGE SUMMARY
[facility] Hospital Medicine Services  DISCHARGE SUMMARY        Prepared For PCP:  Jovana Nur APRN    Patient Name: Rock Rock  : 1940  MRN: [id number]      Date of Admission:   2020    Date of Discharge:   2020    Length of stay:  LOS: 1 day     Hospital Course     Presenting Problem:   hospice admit      There are no hospital problems to display for this patient.    Hospital Course:    Mr. Rock is a 79 y.o. male with past medical history of dementia, hypertension, hypothyroidism, Hyperlipidemia and leukemia who presents to [facility] complaining of pain in left calf and foot.  ED provider from sending facility notes patient presented via EMS complaining of pain and discoloration of his foot for the past 2 days.  Patient is A&O x1 and is a generally poor historian with frequent tangential speech.  He does note his pain began on as a cramping sensation.  He states it has continued to get worse in spite of pain pill provided by his hospice provider.        At the sending facility patient found to have labs significant for potassium: 3.2, glucose: 208, creatinine: 1.96, WBCs: 179.5, hemoglobin: 11.1     2020: Patient in significant more pain and I spoke to family at the bedside.  Patient will be hospice and palliative care only.  Family was explained about options of palliative surgery versus palliative medications.     Left lower extremity acute ischemic limb  -Patient is on heparin drip  -Has been seen by vascular and they have advised conservative management  -Patient is on IV narcotic pain medications for control and also adding fentanyl patch for the pain control.  -Patient has poor prognosis and will be seen by palliative care plan possible hospice  -Patient is hospice now        Acute myeloid leukemia  -Patient being seen by oncology  -Poor prognosis     Chronic kidney disease stage III     Hospital course.  Patient had significant acute ischemia  of the left lower extremity.  Patient was seen by vascular surgery and advised palliative management.  Patient was on heparin drip and IV narcotics.  His pain was very uncontrolled and he was not improving.  Family was explained and patient was seen by palliative medicine as well as it was advised the patient should go to hospice the patient was discharged to hospice for comfort management only.    Patient subsequently  comfortably.       Recommendation for Outpatient Providers:     Reasons For Change In Medications and Indications for New Medications:    Day of Discharge     HPI:     Vital Signs:         Physical Exam:  Physical Exam    Pertinent  and/or Most Recent Results     Results from last 7 days   Lab Units 206 20  1809   WBC 10*3/mm3 180.40*  --  172.20*  --    HEMOGLOBIN g/dL 9.7*  --  10.2*  --    HEMOGLOBIN, POC g/dL  --  11.2*  --   --    HEMATOCRIT % 28.2*  --  30.2*  --    HEMATOCRIT POC %  --  33*  --   --    PLATELETS 10*3/mm3 36*  --  36*  --    SODIUM mmol/L  --   --  137  --    POTASSIUM mmol/L 3.7  --  4.0 4.0   CHLORIDE mmol/L  --   --  97*  --    CO2 mmol/L  --   --  23.0  --    BUN mg/dL  --   --  23  --    CREATININE mg/dL  --   --  1.90*  --    GLUCOSE mg/dL  --   --  146*  --    CALCIUM mg/dL  --   --  8.7  --      Results from last 7 days   Lab Units 20  0825 20  02420  1243 20  2108   BILIRUBIN mg/dL  --   --   --  0.4  --    ALK PHOS U/L  --   --   --  80  --    ALT (SGPT) U/L  --   --   --  13  --    AST (SGOT) U/L  --   --   --  34  --    APTT seconds 66.4 58.3* 74.2 71.2 52.8*           Invalid input(s): TG, LDLCALC, LDLREALC  Results from last 7 days   Lab Units 20  0242 02/07/20  0220 02/06/20  0326   PROBNP pg/mL 14,707.0*  --   --    TROPONIN T ng/mL  --   --  0.064*   LACTATE mmol/L  --  0.5  --        Brief Urine Lab Results  (Last result in the past 365 days)      Color    Clarity   Blood   Leuk Est   Nitrite   Protein   CREAT   Urine HCG        20 0244 Yellow Turbid  Comment:  Result checked  Large (3+) Large (3+) Negative 100 mg/dL (2+)               Microbiology Results Abnormal     None          Xr Chest 1 View    Result Date: 2020  Impression:   1.  Cardiomegaly.  No acute cardiopulmonary disease identified.   Electronically Signed By-David Morales On:2020 7:24 AM This report was finalized on  by  David Morales, .                     Test Results Pending at Discharge        Procedures Performed         Consults:   Consults     Date and Time Order Name Status Description    2020 1326 Hematology & Oncology Inpatient Consult Completed     2020 0634 Inpatient Vascular Surgery Consult Completed           Discharge Details       Allergies   Allergen Reactions   • Codeine Itching     Discharge Disposition:   in Medical Facility    Diet:  Hospital:No active diet order    Discharge Activity:     CODE STATUS:    Code Status and Medical Interventions:   Ordered at: 20 9559     Code Status:    No CPR     Medical Interventions (Level of Support Prior to Arrest):    Comfort Measures       Follow-up Appointments  No future appointments.      Electronically signed by Wilber Farias MD, 20, 2:43 PM.

## 2020-02-17 LAB — BACTERIA SPEC AEROBE CULT: NORMAL

## 2020-07-21 NOTE — PROGRESS NOTES
Patient here for C1D3 Dacogen. Patient states he has had some nausea but his nausea medication has helped. He also complains of having diarrhea but says it has improved since he started treatment. He is going 2-3 times per day and has not been taking any imodium. I told him he could start taking imodium as directed and let us know if it gets any worse.    alone

## 2020-11-05 NOTE — TELEPHONE ENCOUNTER
Dr. Stevens asked if [facility] testing was ever sent out on patient. I called [facility] to see if they have received a specimen/check the status of the testing. I spoke to Dolores and she states the testing is on hold because they need additional information on the pt's diagnosis. She asked that I fax over pt's BM report and PET scan if they were available. Pt has not had a PET scan so BM results were faxed to [facility]. Dolores states she would review the results and will call me back.      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 She states if the MD really wants results, he has two options. She states pt can either have a skin punch biopsy done or we can resend a blood specimen once AML is in remission. She states if pt/MD would like to proceed with the punch bx, I could call her back and she would go over how we go about ordering and obtaining the specimen.    Stretcher

## 2021-01-06 NOTE — TELEPHONE ENCOUNTER
I returned the patient's call as he had left me a message stating that he would not be coming in for today's appointment or any appointment scheduled in the month of August. He states that he needs to r/s to the month of September but not on any Wednesday or Thursday. I left him my direct line and asked him to call me at his convenience to r/s this appointment. Inbox message sent to Dr. Stevens to notify him.  
Detail Level: Zone
Topical Steroids Counseling: I discussed with the patient that prolonged use of topical steroids can result in the increased appearance of superficial blood vessels (telangiectasias), lightening (hypopigmentation) and thinning of the skin (atrophy).  Patient understands to avoid using high potency steroids in skin folds, the groin or the face.  The patient verbalized understanding of the proper use and possible adverse effects of topical steroids.  All of the patient's questions and concerns were addressed.

## 2023-04-14 NOTE — PROGRESS NOTES
Hematology/Oncology Inpatient Progress Note    PATIENT NAME: Rock Rock  : 1940  MRN: [id number]    CHIEF COMPLAINT: Weakness    HISTORY OF PRESENT ILLNESS: 79 y.o. male patient presented to the ER on 2019 for complaints of weakness. The patient also complained of shortness of breath and diarrhea and decreased oral intake.  In the ER the patient was hypotensive.  Labs showed a WBC of 0.9, hemoglobin 8.8, platelets 34,000.  The patient was initiated on IV Zosyn, fluids and received 1 unit of packed red blood cells for his symptomatic anemia.  Chest x-ray in the ER showed no active pulmonary disease.  EKG showed sinus tachycardia with occasional PAC.     19  Hematology/Oncology was consulted on patient known to us and last seen in the office by Dr. Stevens on 2019 in follow-up for his diagnosis of acute myeloid leukemia.  The patient was initially referred by his primary care provider for abnormal CBC.  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  The patient was initiated on Dacogen 20 mg/m² IV x10 days on 2019 and received cycle 1 until 2019.  He was hospitalized from 2019 to 2019 with diarrhea, weakness, neutropenic fever.  C. difficile testing was negative.  He is on prophylactic Bactrim, fluconazole, acyclovir.     PCP: Jovana Nur APRN    Subjective   Continues to remain significantly weak.  Patient does not have quality of life.  Continues to have diarrhea which does not seem to be improving.  Had a very lengthy conversation about the prognosis.    ROS:  Review of Systems   Constitutional: Positive for appetite change and fatigue. Negative for chills and fever.   HENT: Negative for ear pain, mouth sores, nosebleeds and sore throat.    Eyes: Negative for photophobia and visual disturbance.   Respiratory: Negative for wheezing and stridor.    Cardiovascular: Negative for chest pain and palpitations.   Gastrointestinal:  "Positive for diarrhea and nausea. Negative for abdominal pain and vomiting.   Endocrine: Negative for cold intolerance and heat intolerance.   Genitourinary: Negative for dysuria and hematuria.   Musculoskeletal: Negative for joint swelling and neck stiffness.   Skin: Positive for rash. Negative for color change.   Neurological: Positive for weakness. Negative for seizures and syncope.   Hematological: Negative for adenopathy.        No obvious bleeding   Psychiatric/Behavioral: Negative for agitation, confusion and hallucinations. The patient is nervous/anxious.         MEDICATIONS:    Scheduled Meds:       Continuous Infusions:      No current facility-administered medications for this encounter.    PRN Meds:       ALLERGIES:    Allergies   Allergen Reactions   • Codeine Itching       Objective    VITALS:   /68 (BP Location: Left arm, Patient Position: Lying)   Pulse 68   Temp 97.8 °F (36.6 °C) (Oral)   Resp 16   Ht 188 cm (74.02\")   Wt 76.4 kg (168 lb 6.9 oz)   SpO2 99%   BMI 21.62 kg/m²     PHYSICAL EXAM:  Physical Exam   Constitutional: He is oriented to person, place, and time. No distress.   Frail-appearing   HENT:   Head: Normocephalic and atraumatic.   Edentulous   Eyes: Conjunctivae and EOM are normal. Right eye exhibits no discharge. Left eye exhibits no discharge. No scleral icterus.   Eyeglasses present   Neck: Normal range of motion. Neck supple. No thyromegaly present.   Cardiovascular: Normal rate, regular rhythm and normal heart sounds. Exam reveals no gallop and no friction rub.   Monitor leads   Pulmonary/Chest: Effort normal. No stridor. No respiratory distress. He has no wheezes.   Abdominal: Soft. He exhibits no mass. There is no tenderness. There is no rebound and no guarding.   Musculoskeletal: Normal range of motion. He exhibits no tenderness.   LUE IV   Lymphadenopathy:     He has no cervical adenopathy.   Neurological: He is alert and oriented to person, place, and time. He " exhibits normal muscle tone.   Skin: Skin is warm. No rash noted. He is not diaphoretic. No erythema.   Scattered ecchymoses   Psychiatric: He has a normal mood and affect. His behavior is normal.   Nursing note and vitals reviewed.        RECENT LABS:  Lab Results (last 24 hours)     Procedure Component Value Units Date/Time    CBC & Differential [[id number]] Collected:  10/02/19 0357    Specimen:  Blood Updated:  10/02/19 0542    Narrative:       The following orders were created for panel order CBC & Differential.  Procedure                               Abnormality         Status                     ---------                               -----------         ------                     CBC Auto Differential[[id number]]        Abnormal            Final result                 Please view results for these tests on the individual orders.    CBC Auto Differential [[id number]]  (Abnormal) Collected:  10/02/19 0357    Specimen:  Blood Updated:  10/02/19 0542     WBC 0.80 10*3/mm3      RBC 2.72 10*6/mm3      Hemoglobin 8.6 g/dL      Hematocrit 24.5 %      MCV 90.2 fL      MCH 31.6 pg      MCHC 35.0 g/dL      RDW 15.1 %      RDW-SD 47.3 fl      MPV 8.4 fL      Platelets 39 10*3/mm3     Narrative:       Differential not indicated due to low WBC.    Basic Metabolic Panel [[id number]]  (Abnormal) Collected:  10/02/19 0357    Specimen:  Blood Updated:  10/02/19 0537     Glucose 102 mg/dL      BUN 16 mg/dL      Creatinine 1.80 mg/dL      Sodium 132 mmol/L      Potassium 3.2 mmol/L      Chloride 96 mmol/L      CO2 24.0 mmol/L      Calcium 7.2 mg/dL      eGFR Non African Amer 36 mL/min/1.73      BUN/Creatinine Ratio 8.9     Anion Gap 15.2 mmol/L     Narrative:       The MDRD GFR formula is only valid for adults with stable renal function between ages 18 and 70.          PENDING RESULTS: none    IMAGING REVIEWED:  No radiology results for the last day    I have reviewed the patient's labs, imaging, reports, and other clinician  documentation.    Assessment/Plan   ASSESSMENT:  1. Acute myeloid leukemia.  Approximately 60% CD13+, CD 33+ moderate, CD34 negative, CD38 positive,  positive + marrow.  Cytogenetics consistent with trisomy 21.  Molecular testing by NGS showed DNMT3A alteration positive, IDH 2 alteration positive, in the p.m. of 1+, low level FLT 3 ITD.  Not a candidate for high intensity therapy nor transplant.  Has been initiated on palliative therapy with Dacogen.  Received cycle 1 of Dacogen on 8/20/2019 - 9/4/2019.  2. Pancytopenia.  Secondary to disease and medication effect.  Has been on prophylactic acyclovir, fluconazole and Bactrim.  Currently on neutropenic precautions.  Has been afebrile.  · 1 unit of packed red blood cells 9/23/2019, 9/25/2019, 9/27/2019, and 10/1/2019  · 1 unit of platelets 9/25/2019  3. History of kidney cancer/right breast cancer/melanoma.  Genetic testing has been performed with results pending.  4. History of biclonal gammopathy.  IgA kappa monoclonal band and M-protein is 0.8, no excess plasma cells were reported in bone marrow.  5. Hypotension/weakness.  Antihypertensives are on hold.    Resolved.  6. Persistent diarrhea.  Possible side effect from Dacogen.  Recent C. difficile negative.  Add Lomotil as needed.  Patient has also developed perianal rash due to diarrhea and has Jessica's Magic Butt paste.  Infectious disease consulted for persistent diarrhea.  While cannot rule out toxicity from Dacogen it is not common to see this level of diarrhea with the drug.  Improving.  We will also ask wound care to see patient for his perianal rash.  Repeat C. difficile negative, GI panel negative.  KUB showed possible gastroenteritis or ileus.  Seen by GI with plans to manage noninvasively due to his pancytopenia.  Possible flexible sigmoidoscopy in the future if diarrhea recurs.  7.  Acute diverticulitis.  On IV Zosyn.  8. THELMA/electrolyte abnormalities.  Managed per primary  team.  9. Hypertension/hypothyroidism/hyperlipidemia.  Chronic disease management per primary team.  10. DVT prophylaxis.  SCDs.    PLAN:  Monitor CBC daily.  1 unit of packed red blood cells today for hemoglobin of 7.2  Transfuse for hemoglobin 7.5 or below and platelets 15,000 or below or signs of bleeding.  Hold  Dacogen until counts recover and clinically improved.  When able to start venetoclax dose will need to be reduced to 200 mg due to interaction with fluconazole at higher doses.  Continue antibiotics and supportive care.  Continue neutropenic precautions.  Continue Lomotil for persistent diarrhea not responding to Imodium.  Diarrhea is improving.  Plan is for home with home health after discharge.  Physical therapy has recommended inpatient rehab but patient has refused.  Consult palliative care to help with goal clarification        I discussed the patients findings and my recommendations with patient.    Electronically signed by ILENE Cadena, 09/30/19, 11:50 AM.  Patient seen and examined.  I personally reviewed all pertinent labs and the  imaging.  I agree with  nurse practitioner's assessment and plan.   Patient's condition does not show any improvement.  He continues to have diarrhea.  Is in a lot of discomfort.  He has been confined to the hospital due to his ongoing problems.  Quality of life remains very poor.  At this point hospice seems appropriate.  If patient shows any sort of dramatic recovery, which seems less likely, we will be happy to see him back in the clinic and reevaluate for treatment.    Sergio Stevens MD  10/11/19  2:18 PM    Much of the above report is an electronic transcription/translation of the spoken language to printed text using Dragon Software. As such, the subtleties and finesse of the spoken language may permit erroneous, or at times, nonsensical words or phrases to be inadvertently transcribed; thus changes may be made at a later date to rectify these  errors.   none

## 2023-10-26 NOTE — PROGRESS NOTES
HEMATOLOGY ONCOLOGY OUTPATIENT FOLLOW UP      Patient name: Rock Rock  : 1940  MRN: [id number]  Primary Care Physician: Jovana Nur APRN  Referring Physician: Jovana Nur APRN  Reason For Consult:     No chief complaint on file.    Acute myeloid leukemia  Weakness  Fevers  Anemia    HPI:   History of Present Illness:  79-year-old male presents to the office today for consultation in regards to his abnormal blood cell counts.  He was referred by his primary care provider, Jovana Nur NP, after lab work revealed a white blood cell count of 1.0 on 2019.  Review of CBC from 3/14/2019 shows a white blood cell count of 4.3.  Previously, he had a normal white count of 8.0 and a hemoglobin of 13.2 on 7/3/2018.  Patient has a history of multiple cancers in the past.    · 3/14/2019 --WBC 4.3, hemoglobin 12.9, MCV 90.8, platelets 201,000.  Creatinine 1.7  · 2019 --WBC 1.0, hemoglobin 9.4, .7, platelets 150,000, ANC 0.3.  Absolute blasts 0.13.  Ferritin 518.2, iron 53, , iron sat 25, no folate or B12 deficiency.  BUN 20, creatinine 1.6    Patient was seen briefly in  by Dr. Pulido for diagnosis of biclonal gammopathy but was lost to follow-up.  Patient had a past history of renal cell carcinoma with left nephrectomy in .  He was followed by Dr. Hummle and was found to have elevated creatinine.  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  Skeletal survey showed degenerative changes only.  Patient also has history of right breast cancer diagnosed in  he had the tumor removed at [facility] and subsequent axillary lymph node dissection.  He has also had multiple melanoma skin cancers removed beginning in .  He has had a recent removal of both basal cell and squamous cell carcinomas from the skin.    Lives in [place] with his wife.  He is retired from auto parts sales.   Has 5 adult children.  Previous history of cigarette use quit in 1986.  No alcohol no illicit drug use.  Sister with Breast cancer in her 40's no other family history of hematologic disorder or malignancy.     8/12/2019: SPEP shows M protein of 0.8 g/dL, immunofixation shows IgA kappa monoclonal gammopathy  Reticulocyte count is 2.35% ESR is 107, B12 is 805, haptoglobin 175, WBC 1.6, hemoglobin 9.1, platelets 139K,     8/12/2019: Left posterior iliac crest bone marrow aspirate and core biopsy: Smears are consistent with AML with the 65% blast percentage, blasts or CD11c positive, CD13 positive, CD33 positive, CD34 negative, CD38 positive, CD64 dim +, +, HLA-DR-  Peripheral blood flow cytometry showed 14% blasts.    8/19/2019 repeat bone marrow aspiration: Acute myeloid leukemia with a 60% blasts, by  stain, blasts are CD13 positive, CD33 positive moderate, CD34 negative, CD38 positive, + and HLA-DR minus.  Marrow cellularity 80%, a small population of clonal B cells with CLL phenotype noted.  Flow cytometry shows 55% immature myeloid precursors with +,  cytogenetics trisomy 21', 46 XY, +21  CD13 positive, CD33 positive moderate, CD34 negative, CD38 positive, +   NGS myeloid panel by [facility] :-DNMT3A  alteration positive, IDH 2 alteration positive, NPM 1+, low level of FLT3 ITD    8/19/2019 WBC 1.77, hemoglobin 9.0, platelets 139, neutrophils 120  8/26/2019 WBC 2.03, hemoglobin 7.9, platelets 116, MCV 98.3  8/20/2019 patient started on Dacogen 20 mg/m².  Daily original plan was 5 days, later changed to 10 days    08/19/19 He presents for his Dacogen treatment.  Patient has had about a 17 lb weight loss in one week, reports no appetite and unable to keep anything down.  Admits to feeling tired.  Fevers are resolved /afebrile.  Denies any night sweats/fevers. Denies any pain.  [facility]. She states he is getting weaker by the day. She asks questions  regarding treatment and side effects.  He has been taking antibiotics..  He does report nausea but it is well managed response to antiemetics Zofran.  Denies any nausea from treatment.  Denies any mouth sores.    8/20/2019 to 9/4/2019 -patient received cycle 1 of Dacogen 20 mg/m² x 10 days   8/23/2019 creatinine 2.0, albumin 2.9  9/4/2019 WBC 3.3, hemoglobin 7.2, platelets 44      Subjective: 9/4/19  Patient is feeling extremely weak.  He is having diarrhea about 5-6 times a day.  Sometimes loose stools.  Does not report any strong odor.  Denies any fevers chills or night sweats.  Blood pressure has been running low and he is slightly tachycardic.Finished cycle 1 of Dacogen today.  He is also having rash around the perianal region and is requesting for a topical cream..       The following portions of the patient's history were reviewed and updated as appropriate: allergies, current medications, past family history, past medical history, past social history, past surgical history and problem list.         Past Medical History:   Diagnosis Date   • Anemia    • Biclonal gammopathy     Diagnosed in 2011   • Breast cancer (CMS/HCC)     History of breast cancer in 1991 status post mastectomy and lymph node dissection   • Cancer of kidney (CMS/HCC)     Left kidney cancer status post nephrectomy 2006   • Chronic kidney disease (CKD), stage III (moderate) (CMS/HCC)    • Dyslipidemia    • History of skin cancer     History of multiple skin cancers in 1994   • Hypertension        Past Surgical History:   Procedure Laterality Date   • MASTECTOMY      Right mastectomy   • NEPHRECTOMY      Left nephrectomy         Current Outpatient Medications:   •  acetaminophen (TYLENOL) 500 MG tablet, Take 500 mg by mouth Every 6 (Six) Hours As Needed for Mild Pain ., Disp: , Rfl:   •  acyclovir (ZOVIRAX) 400 MG tablet, Take 1 tablet by mouth 2 (Two) Times a Day. Take no more than 5 doses a day., Disp: 60 tablet, Rfl: 0  •  atenolol  (TENORMIN) 25 MG tablet, ATENOLOL 25 MG TABS, Disp: , Rfl:   •  atorvastatin (LIPITOR) 40 MG tablet, Take 40 mg by mouth Daily., Disp: , Rfl:   •  ciprofloxacin (CIPRO) 500 MG tablet, Take 1 tablet by mouth 2 (Two) Times a Day., Disp: 14 tablet, Rfl: 0  •  clotrimazole (MYCELEX) 10 MG kaitlyn, Take 1 tablet by mouth 3 (Three) Times a Day. For 5 days then PRN oral thrush., Disp: 30 tablet, Rfl: 1  •  CYANOCOBALAMIN PO, Take  by mouth Daily. B12 Gummie, Disp: , Rfl:   •  hydrALAZINE (APRESOLINE) 50 MG tablet, HYDRALAZINE HCL 50 MG TABS, Disp: , Rfl:   •  levothyroxine (SYNTHROID, LEVOTHROID) 75 MCG tablet, LEVOTHYROXINE SODIUM 75 MCG TABS, Disp: , Rfl:   •  Multiple Vitamin (MULTIVITAMIN) capsule, MULTIVITAMINS CAPS, Disp: , Rfl:   •  ondansetron (ZOFRAN) 4 MG tablet, Take 2 tablets by mouth Every 8 (Eight) Hours As Needed for Nausea or Vomiting., Disp: 30 tablet, Rfl: 3  •  sulfamethoxazole-trimethoprim (BACTRIM DS,SEPTRA DS) 800-160 MG per tablet, Take 1 tablet by mouth Daily., Disp: 30 tablet, Rfl: 0  •  Venetoclax 100 MG tablet, Take 1 tablet by mouth Daily. 1 tab day 1, 2 tabs day 2, 4 tabs daily starting day 3, Disp: 120 tablet, Rfl: 0  •  vitamin D (ERGOCALCIFEROL) 61123 units capsule capsule, TAKE ONE CAPSULE BY MOUTH ONCE EVERY WEEK FOR 84 DAYS, Disp: , Rfl: 1  No current facility-administered medications for this visit.     Facility-Administered Medications Ordered in Other Visits:   •  sodium chloride 0.9 % infusion 250 mL, 250 mL, Intravenous, PRN, Kavitha Matthews APRN    No Known Allergies    Family History   Problem Relation Age of Onset   • Cancer Sister        Cancer-related family history includes Cancer in his sister.    Social History     Tobacco Use   • Smoking status: Former Smoker   • Smokeless tobacco: Never Used   Substance Use Topics   • Alcohol use: No     Frequency: Never   • Drug use: No         ROS:     Review of Systems   Constitutional: Positive for appetite change, fatigue and  unexpected weight change. Negative for chills and fever.   HENT: Negative for ear pain, mouth sores, nosebleeds and sore throat.    Eyes: Negative for photophobia and visual disturbance.   Respiratory: Negative for wheezing and stridor.    Cardiovascular: Negative for chest pain and palpitations.   Gastrointestinal: Negative for abdominal pain, diarrhea, nausea and vomiting.   Endocrine: Negative for cold intolerance and heat intolerance.   Genitourinary: Negative for dysuria and hematuria.   Musculoskeletal: Negative for joint swelling and neck stiffness.   Skin: Negative for color change and rash.   Neurological: Negative for seizures and syncope.   Hematological: Negative for adenopathy.        No obvious bleeding   Psychiatric/Behavioral: Negative for agitation, confusion and hallucinations.   All other systems reviewed and are negative.      Objective: Vital signs reviewed    There were no vitals filed for this visit.  ECOG 2      Physical Exam:     Physical Exam   Constitutional: He is oriented to person, place, and time. He appears well-developed and well-nourished. No distress.   HENT:   Head: Normocephalic and atraumatic.   Eyes: Conjunctivae and EOM are normal. Right eye exhibits no discharge. Left eye exhibits no discharge. No scleral icterus.   Neck: Normal range of motion. Neck supple. No thyromegaly present.   Cardiovascular: Normal rate, regular rhythm and normal heart sounds. Exam reveals no gallop and no friction rub.   Pulmonary/Chest: Effort normal. No stridor. No respiratory distress. He has no wheezes.   Abdominal: Soft. Bowel sounds are normal. He exhibits no mass. There is no tenderness. There is no rebound and no guarding.   Musculoskeletal: Normal range of motion. He exhibits edema. He exhibits no tenderness.   Edema/lymphedema of the right upper extremity   Lymphadenopathy:     He has no cervical adenopathy.   Neurological: He is alert and oriented to person, place, and time. He exhibits  normal muscle tone.   Skin: Skin is warm. No rash noted. He is not diaphoretic. No erythema.   Left leg healing ulcer from skin cancer removal   Psychiatric: He has a normal mood and affect. His behavior is normal.   Nursing note and vitals reviewed.      Lab Results - Last 18 Months   Lab Units 09/04/19  0824 09/03/19  1354 08/29/19  1628   WBC 10*3/mm3 3.30* 1.84* 1.80*   HEMOGLOBIN g/dL 7.2* 7.3* 7.9*   HEMATOCRIT % 21.1* 21.9* 22.5*   PLATELETS 10*3/mm3 44* 30* 89*   MCV fL 96.8 97.8* 95.2     Lab Results - Last 18 Months   Lab Units 08/23/19  1148 08/19/19  1333 03/14/19  1333   SODIUM mmol/L 134* 129* 137   POTASSIUM mmol/L 3.4* 4.0 4.6   CHLORIDE mmol/L 101 95* 102   TOTAL CO2 mmol/L  --   --  26   CO2 mmol/L 19.0* 19.0*  --    BUN mg/dL 24* 28* 18   CREATININE mg/dL 2.00* 2.30* 1.7*   CALCIUM mg/dL 7.9* 8.4* 9.3   BILIRUBIN mg/dL 0.9 1.4*  --    ALK PHOS U/L 48 51  --    ALT (SGPT) U/L 47 34  --    AST (SGOT) U/L 39 40  --    GLUCOSE mg/dL 117* 175* 96       Lab Results   Component Value Date    GLUCOSE 117 (H) 08/23/2019    BUN 24 (H) 08/23/2019    CREATININE 2.00 (H) 08/23/2019    EGFRIFNONA 32 (L) 08/23/2019    BCR 12.0 08/23/2019    K 3.4 (L) 08/23/2019    CO2 19.0 (L) 08/23/2019    CALCIUM 7.9 (L) 08/23/2019    PROTENTOTREF 7.0 08/12/2019    ALBUMIN 2.90 (L) 08/23/2019    LABIL2 1.0 08/12/2019    AST 39 08/23/2019    ALT 47 08/23/2019     No results found for: IRON, TIBC, FERRITIN  No results found for: FOLATE  No results found for: OCCULTBLD  Lab Results   Component Value Date    RETICCTPCT 2.35 (H) 08/12/2019     Lab Results   Component Value Date    DOADUOMP55 805 08/12/2019     Lab Results   Component Value Date    SPEP Comment 08/12/2019     No results found for: LDH, URICACID  Lab Results   Component Value Date    SEDRATE 107 (H) 08/12/2019     Lab Results   Component Value Date    HAPTOGLOBIN 175 08/12/2019     No results found for: PTT, INR  No results found for:   No results found for:  CEA  No components found for: CA-19-9  No results found for: PSA          Assessment/Plan     Assessment:  1. Acute myeloid leukemia::-With approximately 60% CD13 positive, CD33 positive moderate, CD34 negative, CD38 positive, +  blasts in bone marrow.  Patient presented with very severe leukopenia neutropenia.  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  Molecular testing by NGS shows DNMT3A  alteration positive, IDH 2 alteration positive, NPM 1+, low level of FLT3 ITD.  Patient has advanced age and is not a candidate for high-intensity therapy and is not a candidate for transplantation.  He has been started on low intensity therapy with hypo-methylating agent, Dacogen.  Both the IDH mutation and DNMT mutations affect DNA methylation, therefore hypo-methylating therapy may be helpful.  He has been initiated on hypo-methylating agents  2. Severe neutropenia: He does not have fever.  He is on prophylactic antibiotics.  3. Diarrhea: About 5-6 times a day.  He also has perianal rash due to the diarrhea  4. History of kidney cancer/right breast cancer/melanoma: We have ordered genetic testing.  We will have to follow-up those results.  5. History of biclonal gammopathy: Recent SPEP shows IgA kappa monoclonal band M protein is 0.8, but no excess plasma cells reported in the bone marrow  6. Hx. Of multiple basal and squamous cell carcinomas.  7. Chronic kidney disease stage III: His creatinine has ranged around 1.6.  8. Nausea: Managed with the anti-medics          Plan:  1. We will need to be admitted to the hospital since he looks very weak and frail.  Will need hydration.  May need work-up and supportive care for his diarrhea.  Will need to check electrolytes  2. We will also need wound care evaluation after admission to the hospital for his perianal rash.  3. Will need transfusion today since hemoglobin is very low.  4. To improve his response rates, I will be adding venetoclax to the treatment regimen.   Getting authorization from insurance company..    5. We will follow-up genetic Testing due to patient history of multiple malignancy and sister with breast cancer at early age.  Pretest counseling was provided...  6. Recommend neutropenic precautions  7. Admit to hospital.  Discussed with the hospitalist.          There are no diagnoses linked to this encounter.  No orders of the defined types were placed in this encounter.                Thank you very much for providing the opportunity to participate in this patient’s care. Please do not hesitate to call if there are any other questions      I have reviewed all relevant labs results, imaging, vitals, medications and plan with the patient today.    Portions of the note have been Scribed by medical assistant/Nurse.  I have reviewed and made changes accordingly.           Clofazimine Pregnancy And Lactation Text: This medication is Pregnancy Category C and isn't considered safe during pregnancy. It is excreted in breast milk.

## 2024-05-09 NOTE — ADDENDUM NOTE
Encounter addended by: Rosa Maria Childress RN on: 9/3/2019 6:53 PM   Actions taken: Sign clinical note
For information on Fall & Injury Prevention, visit: [email or web address] OR  [email or web address] OR  https://www.cdc.gov/steadi/patient.html